# Patient Record
Sex: MALE | Race: BLACK OR AFRICAN AMERICAN | Employment: OTHER | URBAN - METROPOLITAN AREA
[De-identification: names, ages, dates, MRNs, and addresses within clinical notes are randomized per-mention and may not be internally consistent; named-entity substitution may affect disease eponyms.]

---

## 2018-11-24 ENCOUNTER — OFFICE VISIT (OUTPATIENT)
Dept: FAMILY MEDICINE CLINIC | Age: 58
End: 2018-11-24

## 2018-11-24 VITALS
OXYGEN SATURATION: 97 % | WEIGHT: 191 LBS | SYSTOLIC BLOOD PRESSURE: 148 MMHG | DIASTOLIC BLOOD PRESSURE: 96 MMHG | RESPIRATION RATE: 18 BRPM | HEART RATE: 89 BPM | TEMPERATURE: 98.2 F | HEIGHT: 66 IN | BODY MASS INDEX: 30.7 KG/M2

## 2018-11-24 DIAGNOSIS — R07.89 ATYPICAL CHEST PAIN: ICD-10-CM

## 2018-11-24 DIAGNOSIS — R51.9 BILATERAL HEADACHES: ICD-10-CM

## 2018-11-24 DIAGNOSIS — I10 ESSENTIAL HYPERTENSION: Primary | ICD-10-CM

## 2018-11-24 DIAGNOSIS — Z72.0 TOBACCO ABUSE: ICD-10-CM

## 2018-11-24 RX ORDER — AMLODIPINE BESYLATE 5 MG/1
5 TABLET ORAL DAILY
Qty: 90 TAB | Refills: 3 | Status: SHIPPED | OUTPATIENT
Start: 2018-11-24 | End: 2020-04-09 | Stop reason: SDUPTHER

## 2018-11-24 NOTE — PROGRESS NOTES
Ken Mcmanus [address] [place] [address] Cas[place]. [address]  [phone]             Date of visit: 11/24/2018   Subjective:      History obtained from:  the patient. Loretta Matos is a 62 y.o. male who presents today for new patient, doesn't have insurance so hasn't had his blood pressure medication in 2 years  Having some headaches and thinks that is due to his BP,f elt better before when on bp med  Headache is all over,all the way around, makes eyes hurt  Not every day  No nausea  Usually happens if he doesn't eat  Admits to some left-sided chest tightness, not really pain, no associated shortness of breath, lasts 20-30 sec or maybe a little longer, relieved by rest.    Has had a stress test when he wound up in ER about a year ago, everything was normal  Does have history of GERD and used to be on zantac but not having that now. Patient Active Problem List    Diagnosis Date Noted    Essential hypertension 11/24/2018    Tobacco abuse 11/24/2018       No Known Allergies  Past Medical History:   Diagnosis Date    Hypertension      History reviewed. No pertinent surgical history. Family History   Problem Relation Age of Onset   24 [facility] Hypertension Mother     Diabetes Mother     Hypertension Sister     Diabetes Sister     Hypertension Maternal Grandfather     Diabetes Maternal Grandfather      Social History     Tobacco Use    Smoking status: Current Every Day Smoker     Packs/day: 0.50     Years: 10.00     Pack years: 5.00    Smokeless tobacco: Never Used   Substance Use Topics    Alcohol use: Yes     Alcohol/week: 1.2 oz     Types: 2 Cans of beer per week      Social History     Social History Narrative    Not on file        Review of Systems  Gen: denies fever  Pulm: denies sob or cough.      PHQ over the last two weeks 11/24/2018   Little interest or pleasure in doing things Not at all   Feeling down, depressed, irritable, or hopeless Not at all   Total Score PHQ 2 0 Objective:     Vitals:    11/24/18 0855   BP: (!) 148/96   Pulse: 89   Resp: 18   Temp: 98.2 °F (36.8 °C)   TempSrc: Oral   SpO2: 97%   Weight: 191 lb (86.6 kg)   Height: 5' 6\" (1.676 m)     Body mass index is 30.83 kg/m². General: stated age, well-developed, and in NAD  Eyes: PERRL, EOMI, no redness or drainage  Nose: no drainage  Mouth: no lesions  Throat: no erythema, exudate or swelling  Neck: supple, symmetrical, trachea midline, no adenopathy and thyroid: not enlarged, symmetric, no tenderness/mass/nodules  Lungs:  clear to auscultation w/o rales, rhonchi, wheezes w/normal effort and no use of accessory muscles of respiration   Heart: regular rate and rhythm, S1, S2 normal, no murmur, click, rub or gallop  Abdomen: soft, nontender, no masses  Ext:  No edema noted. Lymph: no cervical adenopathy appreciated  Skin:  Normal. and no rash or abnormalities   Neuro: normal gait, CN 2-12 intact  Psych: alert and oriented to person, place, time and situation and Speech: appropriate quality, quantity and organization of sentences and normal affect    Assessment/Plan:       ICD-10-CM ICD-9-CM    1. Essential hypertension I10 401.9    2. Tobacco abuse Z72.0 305.1    3. Bilateral headaches R51 784.0    4.  Atypical chest pain R07.89 786.59         Orders Placed This Encounter    amLODIPine (NORVASC) 5 mg tablet       Start amlodipine, goodrx coupon given, urged him not to stop his bp med  Update me in 1 week about bp  Holding off on labs but he had some at work physical 3-4 mo ago, asked him to try to get me those results  Discussed that there is other preventive care he needs but holding off for now due to no insurance  Already had flu shot  Eating well, getting exercise at work, this was encouraged  Urged to quit smoking and resources given  He is ready to try  Discussed medication but really doesn't need it as little as he smokes  Chest tightness very atypical and had normal stress test last year but asked him to let me know if not improving  Hopefully TORRES will also improve back on med as it did in the past    Discussed the diagnosis and plan and he expressed understanding. Follow-up Disposition:  Return in about 1 year (around 11/24/2019) for Follow up.     Emerald Melendez MD

## 2018-11-24 NOTE — PROGRESS NOTES
Chief Complaint   Patient presents with    Headache    Elevated Blood Pressure    Establish Care     Pt presents in office today to establish care, has c/o headache and elevated blood pressue  Pr reports history of HTN, used to take BP medications but cannot remember the name of meds    1. Have you been to the ER, urgent care clinic since your last visit? Hospitalized since your last visit? No    2. Have you seen or consulted any other health care providers outside of the [address] [place] [address] since your last visit? Include any pap smears or colon screening.  No

## 2018-11-24 NOTE — PATIENT INSTRUCTIONS
Call 1-800-QUIT-NOW for help with quitting smoking! You can also get help from a text-messaging program: 
Smoke-freeTXT (see smokefree.gov) Or [employer] arvind Your blood pressure should be less than 140/90 consistently (more than 90% of the time). Check it after sitting and resting for 10 minutes for an accurate result. Please call me if it is often running high. DASH Diet: Care Instructions Your Care Instructions The DASH diet is an eating plan that can help lower your blood pressure. DASH stands for Dietary Approaches to Stop Hypertension. Hypertension is high blood pressure. The DASH diet focuses on eating foods that are high in calcium, potassium, and magnesium. These nutrients can lower blood pressure. The foods that are highest in these nutrients are fruits, vegetables, low-fat dairy products, nuts, seeds, and legumes. But taking calcium, potassium, and magnesium supplements instead of eating foods that are high in those nutrients does not have the same effect. The DASH diet also includes whole grains, fish, and poultry. The DASH diet is one of several lifestyle changes your doctor may recommend to lower your high blood pressure. Your doctor may also want you to decrease the amount of sodium in your diet. Lowering sodium while following the DASH diet can lower blood pressure even further than just the DASH diet alone. Follow-up care is a key part of your treatment and safety. Be sure to make and go to all appointments, and call your doctor if you are having problems. It's also a good idea to know your test results and keep a list of the medicines you take. How can you care for yourself at home? Following the DASH diet · Eat 4 to 5 servings of fruit each day. A serving is 1 medium-sized piece of fruit, ½ cup chopped or canned fruit, 1/4 cup dried fruit, or 4 ounces (½ cup) of fruit juice. Choose fruit more often than fruit juice. · Eat 4 to 5 servings of vegetables each day. A serving is 1 cup of lettuce or raw leafy vegetables, ½ cup of chopped or cooked vegetables, or 4 ounces (½ cup) of vegetable juice. Choose vegetables more often than vegetable juice. · Get 2 to 3 servings of low-fat and fat-free dairy each day. A serving is 8 ounces of milk, 1 cup of yogurt, or 1 ½ ounces of cheese. · Eat 6 to 8 servings of grains each day. A serving is 1 slice of bread, 1 ounce of dry cereal, or ½ cup of cooked rice, pasta, or cooked cereal. Try to choose whole-grain products as much as possible. · Limit lean meat, poultry, and fish to 2 servings each day. A serving is 3 ounces, about the size of a deck of cards. · Eat 4 to 5 servings of nuts, seeds, and legumes (cooked dried beans, lentils, and split peas) each week. A serving is 1/3 cup of nuts, 2 tablespoons of seeds, or ½ cup of cooked beans or peas. · Limit fats and oils to 2 to 3 servings each day. A serving is 1 teaspoon of vegetable oil or 2 tablespoons of salad dressing. · Limit sweets and added sugars to 5 servings or less a week. A serving is 1 tablespoon jelly or jam, ½ cup sorbet, or 1 cup of lemonade. · Eat less than 2,300 milligrams (mg) of sodium a day. If you limit your sodium to 1,500 mg a day, you can lower your blood pressure even more. Tips for success · Start small. Do not try to make dramatic changes to your diet all at once. You might feel that you are missing out on your favorite foods and then be more likely to not follow the plan. Make small changes, and stick with them. Once those changes become habit, add a few more changes. · Try some of the following: ? Make it a goal to eat a fruit or vegetable at every meal and at snacks. This will make it easy to get the recommended amount of fruits and vegetables each day. ? Try yogurt topped with fruit and nuts for a snack or healthy dessert. ? Add lettuce, tomato, cucumber, and onion to sandwiches. ? Combine a ready-made pizza crust with low-fat mozzarella cheese and lots of vegetable toppings. Try using tomatoes, squash, spinach, broccoli, carrots, cauliflower, and onions. ? Have a variety of cut-up vegetables with a low-fat dip as an appetizer instead of chips and dip. ? Sprinkle sunflower seeds or chopped almonds over salads. Or try adding chopped walnuts or almonds to cooked vegetables. ? Try some vegetarian meals using beans and peas. Add garbanzo or kidney beans to salads. Make burritos and tacos with mashed awan beans or black beans. Where can you learn more? Go to http://taj-sky.info/. Enter S795 in the search box to learn more about \"DASH Diet: Care Instructions. \" Current as of: December 6, 2017 Content Version: 11.8 © 4696-2340 Healthwise, [employer]. Care instructions adapted under license by [employer] (which disclaims liability or warranty for this information). If you have questions about a medical condition or this instruction, always ask your healthcare professional. [address] any warranty or liability for your use of this information.

## 2020-03-18 ENCOUNTER — TELEPHONE (OUTPATIENT)
Dept: FAMILY MEDICINE CLINIC | Age: 60
End: 2020-03-18

## 2020-03-18 NOTE — TELEPHONE ENCOUNTER
----- Message from Kathi Villar sent at 3/18/2020  3:37 PM EDT ----- Regarding: CHICA Davis/Telephone Contact: [phone] Caller's first and last name and relationship to patient (if not the patient): n/a Best contact number: [phone] Preferred date and time: as soon as possible Scheduled appointment date and time: 4/20/20 @ 2pm 
Reason for appointment: CHICA est pcp; ED Shelli Avendaño/Darrell, 3/13/20, diagnosis 580 high sugar level Details to clarify the request: n/a

## 2020-04-07 ENCOUNTER — TELEPHONE (OUTPATIENT)
Dept: FAMILY MEDICINE CLINIC | Age: 60
End: 2020-04-07

## 2020-04-07 NOTE — TELEPHONE ENCOUNTER
Outbound call.  verified. Spoke with the patient, let the pt know I was calling in regards to rescheduling NP appointment. Pt stated that he was discharged from [facility] on . Pt stated that his blood sugar was at 580. Let the pt know we are doing virtual visits. Pt wants to know how would this work, pt needs to have sugar levels checked. Please Advised. Best callback:[phone] LOV: 2018

## 2020-04-08 NOTE — TELEPHONE ENCOUNTER
Yes, [facility] in the office, thank you, since he will need labs anyway.  Claire Harrison MD 4/8/2020 9:01 AM

## 2020-04-09 ENCOUNTER — VIRTUAL VISIT (OUTPATIENT)
Dept: FAMILY MEDICINE CLINIC | Age: 60
End: 2020-04-09

## 2020-04-09 VITALS — HEIGHT: 66 IN | BODY MASS INDEX: 30.53 KG/M2 | WEIGHT: 190 LBS

## 2020-04-09 DIAGNOSIS — E66.9 OBESITY (BMI 30.0-34.9): ICD-10-CM

## 2020-04-09 DIAGNOSIS — G47.33 OSA (OBSTRUCTIVE SLEEP APNEA): ICD-10-CM

## 2020-04-09 DIAGNOSIS — E11.65 TYPE 2 DIABETES MELLITUS WITH HYPERGLYCEMIA, WITHOUT LONG-TERM CURRENT USE OF INSULIN (HCC): Primary | ICD-10-CM

## 2020-04-09 DIAGNOSIS — R07.89 ATYPICAL CHEST PAIN: ICD-10-CM

## 2020-04-09 DIAGNOSIS — Z72.0 TOBACCO ABUSE: ICD-10-CM

## 2020-04-09 DIAGNOSIS — K21.9 GASTROESOPHAGEAL REFLUX DISEASE, ESOPHAGITIS PRESENCE NOT SPECIFIED: ICD-10-CM

## 2020-04-09 DIAGNOSIS — I10 ESSENTIAL HYPERTENSION: ICD-10-CM

## 2020-04-09 RX ORDER — AMLODIPINE BESYLATE 5 MG/1
5 TABLET ORAL DAILY
Qty: 90 TAB | Refills: 3 | Status: SHIPPED | OUTPATIENT
Start: 2020-04-09 | End: 2020-05-14 | Stop reason: SDUPTHER

## 2020-04-09 NOTE — PATIENT INSTRUCTIONS

## 2020-04-09 NOTE — PROGRESS NOTES
Ken Mcmanus [facility]  [id number] [place] [address] [address]. Car[place], [address]  [phone]             Date of visit: 4/9/2020   Subjective:      History obtained from:  the patient. Giana Campbell is a 61 y.o. male who presents today for follow up    This service was provided through telehealth (doxy. me real-time video/audio) due to COVID-19 pandemic, with the patient being at home and the provider being at [facility] in [address] Others assisting in the telehealth encounter included none. 20 minutes were spent with the patient by the provider. he  and/or his healthcare decision maker is aware that this patient-initiated Telehealth encounter is a billable service, with coverage as determined by his insurance carrier. he  is aware that he  may receive a bill and has provided verbal consent to proceed: Yes per PSR    Was discharged from [facility] ER on 3/7 and 3/25, records reviewed  On 3/7 he presented with left sided chest pain 1 hour after eating Jannet Mech, had resolved when he got to ER  BP was 156/99  Other vitals normal  Exam was normal  Labs significant for glucose 560, creat 1.14, sugar in urine (no ketones)  All other labs normal including troponin, cbc, lft's electrolytes  ER sent him home with metformin as he was not wanting to be admitted as was offered    He went back to er on 3/25 asking for refill of metformin because he could not see me until late April  Denied symptoms  bp was 150/98  Other vitals normal  Exam normal  Labs not done  Sent home with more metformin and also norvasc 5mg    Was drinking  1 L daily of soda or sweet tea  Was eating peanut M&Ms, donuts, ice cream  Has started drinking just water and diet soda  Hoping that and the metformin are enough  The metformin not bothering his stomach    Previously was urinating every 2 ohurs at night, now he is not.     No insurance so is concerned about bills  ER helped him apply for medicaid but he doesn't know.    The chest pain is gone. Breathing not bothering him    Says he was dx with JAVIER and had cpap a couple years ago and does snore  No longer has cpap    I had not seen him in 1.5 years  Known high blood pressure, GERD but the diabetes is a new diagnosis  Smoking 1/2 PPD, trying to cut back gradually  Blood pressure not checked at home  GERD he does think might have been his problem, but that has been better since he changed his diet    Has had a stress test when he wound up in ER about 3 years ago that was normal      Patient Active Problem List    Diagnosis Date Noted    JAVIER (obstructive sleep apnea) 04/09/2020    Obesity (BMI 30.0-34.9) 04/09/2020    Gastroesophageal reflux disease 04/09/2020    Essential hypertension 11/24/2018    Tobacco abuse 11/24/2018       No Known Allergies  Past Medical History:   Diagnosis Date    Hypertension      History reviewed. No pertinent surgical history. Family History   Problem Relation Age of Onset   24 [facility] Hypertension Mother     Diabetes Mother     Hypertension Sister     Diabetes Sister     Hypertension Maternal Grandfather     Diabetes Maternal Grandfather      Social History     Tobacco Use    Smoking status: Current Every Day Smoker     Packs/day: 0.50     Years: 10.00     Pack years: 5.00    Smokeless tobacco: Never Used   Substance Use Topics    Alcohol use: Yes     Alcohol/week: 2.0 standard drinks     Types: 2 Cans of beer per week      Social History     Social History Narrative    Unemployed currently, worked as sterile processing technician        Review of Systems  Card: denies chest pain  Pulm: denies shortness of breath      Objective:     Vitals:    04/09/20 1046   Weight: 190 lb (86.2 kg)   Height: 5' 6\" (1.676 m)     Body mass index is 30.67 kg/m².      General: stated age, well developed, well nourished and in NAD  Psych: alert and oriented to person, place, time and situation and Speech: appropriate quality, quantity and organization of sentences and normal affect    Assessment/Plan:       ICD-10-CM ICD-9-CM    1. Type 2 diabetes mellitus with hyperglycemia, without long-term current use of insulin (HCC) E11.65 250.00      790.29    2. Essential hypertension I10 401.9    3. Tobacco abuse Z72.0 305.1    4. Atypical chest pain R07.89 786.59    5. Obesity (BMI 30.0-34. 9) E66.9 278.00    6. JAVIER (obstructive sleep apnea) G47.33 327.23    7. Gastroesophageal reflux disease, esophagitis presence not specified K21.9 530.81         Orders Placed This Encounter    amLODIPine (NORVASC) 5 mg tablet     His diabetes symptoms (weakness, frequent urination) have resolved since he stopped sodas, donuts, candy, excessive sweets. He had big room for improvement, so I think possible his diabetes could resolve with diet alone  Good he is on metformin, too  Would like labs asap  Will send information about  to check about medicaid  If can't get medicaid quickly advised to go to [facility]. He is not taking norvasc but willing to go back on it  bp was moderately high twice when in ER. JAVIER known, needs recheck and cpap hopefully when he gets medicaid    gerd symptoms resolved with diet changes  Chest pain was likely from that    Urged to quit smoking completely but is getting closer      Discussed the diagnosis and plan and he expressed understanding. Follow-up and Dispositions    · Return in about 3 months (around 7/9/2020) for Follow up.          Carson Calzada MD

## 2020-04-09 NOTE — PROGRESS NOTES
Identified pt with two pt identifiers(name and ). Reviewed record in preparation for visit and have obtained necessary documentation. Chief Complaint   Patient presents with   [facility] ED Follow-up     2020 for chest pain and numbness in arm elevated glucose        Health Maintenance Due   Topic    Hepatitis C Screening     Pneumococcal 0-64 years (1 of 1 - PPSV23)    DTaP/Tdap/Td series (1 - Tdap)    Lipid Screen     Shingrix Vaccine Age 50> (1 of 2)    FOBT Q1Y Age 54-65         Visit Vitals  Ht 5' 6\" (1.676 m)   Wt 190 lb (86.2 kg) Comment: stated weight   BMI 30.67 kg/m²     Pain Scale: 0 - No pain/10    Coordination of Care Questionnaire:  :   1. Have you been to the ER, urgent care clinic since your last visit? Hospitalized since your last visit?2020 for chest pain and numbness in arm elevated glucose    2. Have you seen or consulted any other health care providers outside of the [address] [place] [address] since your last visit? Include any pap smears or colon screening.  yes

## 2020-05-13 PROBLEM — E11.65 TYPE 2 DIABETES MELLITUS WITH HYPERGLYCEMIA, WITHOUT LONG-TERM CURRENT USE OF INSULIN (HCC): Status: ACTIVE | Noted: 2020-05-13

## 2020-05-13 NOTE — PATIENT INSTRUCTIONS
Diabetes Foot Health: Care Instructions Your Care Instructions When you have diabetes, your feet need extra care and attention. Diabetes can damage the nerve endings and blood vessels in your feet, making you less likely to notice when your feet are injured. Diabetes also limits your body's ability to fight infection and get blood to areas that need it. If you get a minor foot injury, it could become an ulcer or a serious infection. With good foot care, you can prevent most of these problems. Caring for your feet can be quick and easy. Most of the care can be done when you are bathing or getting ready for bed. Follow-up care is a key part of your treatment and safety. Be sure to make and go to all appointments, and call your doctor if you are having problems. It's also a good idea to know your test results and keep a list of the medicines you take. How can you care for yourself at home? · Keep your blood sugar close to normal by watching what and how much you eat, monitoring blood sugar, taking medicines if prescribed, and getting regular exercise. · Do not smoke. Smoking affects blood flow and can make foot problems worse. If you need help quitting, talk to your doctor about stop-smoking programs and medicines. These can increase your chances of quitting for good. · Eat a diet that is low in fats. High fat intake can cause fat to build up in your blood vessels and decrease blood flow. · Inspect your feet daily for blisters, cuts, cracks, or sores. If you cannot see well, use a mirror or have someone help you. · Take care of your feet: 
? Wash your feet every day. Use warm (not hot) water. Check the water temperature with your wrists or other part of your body, not your feet. ? Dry your feet well. Pat them dry. Do not rub the skin on your feet too hard. Dry well between your toes. If the skin on your feet stays moist, bacteria or a fungus can grow, which can lead to infection. ? Keep your skin soft. Use moisturizing skin cream to keep the skin on your feet soft and prevent calluses and cracks. But do not put the cream between your toes, and stop using any cream that causes a rash. ? Clean underneath your toenails carefully. Do not use a sharp object to clean underneath your toenails. Use the blunt end of a nail file or other rounded tool. ? Trim and file your toenails straight across to prevent ingrown toenails. Use a nail clipper, not scissors. Use an emery board to smooth the edges. · Change socks daily. Socks without seams are best, because seams often rub the feet. You can find socks for people with diabetes from specialty catalogs. · Look inside your shoes every day for things like gravel or torn linings, which could cause blisters or sores. · Buy shoes that fit well: 
? Look for shoes that have plenty of space around the toes. This helps prevent bunions and blisters. ? Try on shoes while wearing the kind of socks you will usually wear with the shoes. ? Avoid plastic shoes. They may rub your feet and cause blisters. Good shoes should be made of materials that are flexible and breathable, such as leather or cloth. ? Break in new shoes slowly by wearing them for no more than an hour a day for several days. Take extra time to check your feet for red areas, blisters, or other problems after you wear new shoes. · Do not go barefoot. Do not wear sandals, and do not wear shoes with very thin soles. Thin soles are easy to puncture. They also do not protect your feet from hot pavement or cold weather. · Have your doctor check your feet during each visit. If you have a foot problem, see your doctor. Do not try to treat an early foot problem at home. Home remedies or treatments that you can buy without a prescription (such as corn removers) can be harmful. · Always get early treatment for foot problems. A minor irritation can lead to a major problem if not properly cared for early. When should you call for help? Call your doctor now or seek immediate medical care if: 
  · You have a foot sore, an ulcer or break in the skin that is not healing after 4 days, bleeding corns or calluses, or an ingrown toenail.  
  · You have blue or black areas, which can mean bruising or blood flow problems.  
  · You have peeling skin or tiny blisters between your toes or cracking or oozing of the skin.  
  · You have a fever for more than 24 hours and a foot sore.  
  · You have new numbness or tingling in your feet that does not go away after you move your feet or change positions.  
  · You have unexplained or unusual swelling of the foot or ankle.  
 Watch closely for changes in your health, and be sure to contact your doctor if: 
  · You cannot do proper foot care. Where can you learn more? Go to http://taj-sky.info/ Enter A739 in the search box to learn more about \"Diabetes Foot Health: Care Instructions. \" Current as of: December 19, 2019Content Version: 12.4 © 5115-8930 Healthwise, [employer]. Care instructions adapted under license by [employer] (which disclaims liability or warranty for this information). If you have questions about a medical condition or this instruction, always ask your healthcare professional. [address] any warranty or liability for your use of this information.

## 2020-05-13 NOTE — PROGRESS NOTES
Ken Mcmanus [facility]  [address] [place], [address]  [phone]             Date of visit: 5/14/2020   Subjective:      History obtained from:  the patient. Clarita Sterling is a 61 y.o. male who presents today for follow up    This service was provided through telehealth (doxy. me real-time video/audio) due to COVID-19 pandemic, with the patient being at home and the provider being at [facility] in [place], [address] [facility]. Others assisting in the telehealth encounter included none. 15 minutes were spent with the patient by the provider. he  and/or his healthcare decision maker is aware that this patient-initiated Telehealth encounter is a billable service, with coverage as determined by his insurance carrier.  he  is aware that he  may receive a bill and has provided verbal consent to proceed: Yes per PSR    Newly diagnosed diabetes (dx at [facility] ER in March)  He cut out the sweets (1L soda/sweet tea daily, desserts, donuts) and symptoms resolved; still trying to avoid it as much as possible  Sugars not checked  Taking the metformin but ran out of it 1 month ago    Weight the same he thinks    Also on norvasc but hasn't checked bp    Having the chest pains again like he did in ER which they thought was acid reflux    Had wanted to order labs but had encouraged him to look into Medicaid or even cross over clinic as not wanting him to get more big bills  He has applied but hasn't heard back    Want to get his JAVIER re-evaluated and get him a cpap after he has insurance    Cutting back on smoking but still about the same    Patient Active Problem List    Diagnosis Date Noted    Type 2 diabetes mellitus with hyperglycemia, without long-term current use of insulin ([facility] [address] 05/13/2020    JAVIER (obstructive sleep apnea) 04/09/2020    Obesity (BMI 30.0-34.9) 04/09/2020    Gastroesophageal reflux disease 04/09/2020    Essential hypertension 11/24/2018    Tobacco abuse 11/24/2018 No Known Allergies  Past Medical History:   Diagnosis Date    Hypertension      No past surgical history on file. Family History   Problem Relation Age of Onset   [address] Hypertension Mother     Diabetes Mother     Hypertension Sister     Diabetes Sister     Hypertension Maternal Grandfather     Diabetes Maternal Grandfather      Social History     Tobacco Use    Smoking status: Current Every Day Smoker     Packs/day: 0.50     Years: 10.00     Pack years: 5.00    Smokeless tobacco: Never Used   Substance Use Topics    Alcohol use: Yes     Alcohol/week: 2.0 standard drinks     Types: 2 Cans of beer per week      Social History     Social History Narrative    Unemployed currently, worked as sterile processing technician        Review of Systems  Gen: denies fever  pulm denies cough      Objective: There were no vitals filed for this visit. There is no height or weight on file to calculate BMI. General: stated age, well developed, well nourished and in NAD  Skin:  No lesions noted on video  Psych: alert and oriented to person, place, time and situation and Speech: appropriate quality, quantity and organization of sentences      Assessment/Plan:       ICD-10-CM ICD-9-CM    1. Type 2 diabetes mellitus with hyperglycemia, without long-term current use of insulin (HCC) E11.65 250.00 HEMOGLOBIN A1C WITH EAG     292.13 METABOLIC PANEL, COMPREHENSIVE   2. Essential hypertension I10 401.9    3. Obesity (BMI 30.0-34. 9) E66.9 278.00    4.  Tobacco abuse Z72.0 305.1    5. JAVIER (obstructive sleep apnea) G47.33 327.23         Orders Placed This Encounter    HEMOGLOBIN A1C WITH EAG    METABOLIC PANEL, COMPREHENSIVE    DISCONTD: metFORMIN (GLUCOPHAGE) 500 mg tablet    pantoprazole (PROTONIX) 40 mg tablet    metFORMIN (GLUCOPHAGE) 500 mg tablet    amLODIPine (NORVASC) 5 mg tablet     Need labs but suspect DM much improved since he cut out the soda and donuts  Out of metformin fo r amonth but willing to go back on it  Delaying other labs until he gets insurance  Encouraged continued healthy lifestyle changes  Suspect the atypical chest pains are GERD as concluded in ER recently, advised to try the protonix and let me know if it doesn't help  Encouraged to check bp and let me know  Plan to refer for JAVIER when he has insurance    Discussed the diagnosis and plan and he expressed understanding. Follow-up and Dispositions    · Return in about 3 months (around 8/14/2020) for Follow up.          Macho Rossi MD

## 2020-05-14 ENCOUNTER — VIRTUAL VISIT (OUTPATIENT)
Dept: FAMILY MEDICINE CLINIC | Age: 60
End: 2020-05-14

## 2020-05-14 DIAGNOSIS — I10 ESSENTIAL HYPERTENSION: ICD-10-CM

## 2020-05-14 DIAGNOSIS — Z72.0 TOBACCO ABUSE: ICD-10-CM

## 2020-05-14 DIAGNOSIS — E66.9 OBESITY (BMI 30.0-34.9): ICD-10-CM

## 2020-05-14 DIAGNOSIS — G47.33 OSA (OBSTRUCTIVE SLEEP APNEA): ICD-10-CM

## 2020-05-14 DIAGNOSIS — E11.65 TYPE 2 DIABETES MELLITUS WITH HYPERGLYCEMIA, WITHOUT LONG-TERM CURRENT USE OF INSULIN (HCC): Primary | ICD-10-CM

## 2020-05-14 RX ORDER — PANTOPRAZOLE SODIUM 40 MG/1
40 TABLET, DELAYED RELEASE ORAL DAILY
Qty: 30 TAB | Refills: 6 | Status: SHIPPED | OUTPATIENT
Start: 2020-05-14 | End: 2020-09-04

## 2020-05-14 RX ORDER — METFORMIN HYDROCHLORIDE 500 MG/1
500 TABLET ORAL 2 TIMES DAILY WITH MEALS
Qty: 60 TAB | Refills: 6 | Status: SHIPPED | OUTPATIENT
Start: 2020-05-14 | End: 2020-05-14 | Stop reason: SDUPTHER

## 2020-05-14 RX ORDER — METFORMIN HYDROCHLORIDE 500 MG/1
500 TABLET ORAL 2 TIMES DAILY WITH MEALS
Qty: 60 TAB | Refills: 6 | Status: SHIPPED | OUTPATIENT
Start: 2020-05-14 | End: 2021-03-22 | Stop reason: SDUPTHER

## 2020-05-14 RX ORDER — AMLODIPINE BESYLATE 5 MG/1
5 TABLET ORAL DAILY
Qty: 30 TAB | Refills: 6 | Status: SHIPPED | OUTPATIENT
Start: 2020-05-14 | End: 2020-09-04

## 2020-07-17 ENCOUNTER — OFFICE VISIT (OUTPATIENT)
Dept: CARDIOLOGY CLINIC | Age: 60
End: 2020-07-17

## 2020-07-17 VITALS
BODY MASS INDEX: 29.09 KG/M2 | WEIGHT: 181 LBS | DIASTOLIC BLOOD PRESSURE: 80 MMHG | HEART RATE: 73 BPM | HEIGHT: 66 IN | RESPIRATION RATE: 20 BRPM | SYSTOLIC BLOOD PRESSURE: 130 MMHG | OXYGEN SATURATION: 97 %

## 2020-07-17 DIAGNOSIS — I50.22 SYSTOLIC CHF, CHRONIC (HCC): ICD-10-CM

## 2020-07-17 DIAGNOSIS — I10 ESSENTIAL HYPERTENSION: ICD-10-CM

## 2020-07-17 DIAGNOSIS — E11.65 TYPE 2 DIABETES MELLITUS WITH HYPERGLYCEMIA, WITHOUT LONG-TERM CURRENT USE OF INSULIN (HCC): ICD-10-CM

## 2020-07-17 DIAGNOSIS — I25.10 CORONARY ARTERY DISEASE INVOLVING NATIVE CORONARY ARTERY OF NATIVE HEART WITHOUT ANGINA PECTORIS: Primary | ICD-10-CM

## 2020-07-17 RX ORDER — ATORVASTATIN CALCIUM 80 MG/1
80 TABLET, FILM COATED ORAL DAILY
COMMUNITY
End: 2021-03-08 | Stop reason: SDUPTHER

## 2020-07-17 RX ORDER — NITROGLYCERIN 0.4 MG/1
0.4 TABLET SUBLINGUAL
COMMUNITY

## 2020-07-17 RX ORDER — METOPROLOL SUCCINATE 100 MG/1
100 TABLET, EXTENDED RELEASE ORAL DAILY
COMMUNITY
End: 2020-09-04

## 2020-07-17 RX ORDER — FAMOTIDINE 20 MG/1
20 TABLET, FILM COATED ORAL DAILY
COMMUNITY
End: 2021-07-12

## 2020-07-17 RX ORDER — LISINOPRIL 10 MG/1
10 TABLET ORAL DAILY
COMMUNITY
End: 2020-09-04

## 2020-07-17 RX ORDER — ASPIRIN 81 MG/1
TABLET ORAL DAILY
COMMUNITY

## 2020-07-17 RX ORDER — CLOPIDOGREL BISULFATE 75 MG/1
75 TABLET ORAL DAILY
COMMUNITY
End: 2021-03-08

## 2020-07-17 NOTE — Clinical Note
8/4/20 Patient: Pat Mckenna YOB: 1960 Date of Visit: 7/17/2020 Ryann Villarreal MD 
[address] [facility] [address] [address] VIA In Basket Dear Ryann Villarreal MD, Thank you for referring Mr. Pat Mckenna to [address] [place] [address] for evaluation. My notes for this consultation are attached. If you have questions, please do not hesitate to call me. I look forward to following your patient along with you.  
 
 
Sincerely, 
 
Shwetha Cabello MD

## 2020-07-17 NOTE — PROGRESS NOTES
Visit Vitals  /80 (BP 1 Location: Left arm, BP Patient Position: Sitting)   Pulse 73   Resp 20   Ht 5' 6\" (1.676 m)   Wt 181 lb (82.1 kg)   SpO2 97%   BMI 29.21 kg/m²

## 2020-07-17 NOTE — PROGRESS NOTES
Andrae Lizarraga     1960       Anjelica Damon MD, [facility]  Date of Visit-7/17/2020   PCP is Kiana García MD   [facility] [place]  [facility]  HPI:  Andrae Lizarraga is a 61 y.o. male   Here to establish as a new patient with CAD  Referral from Kiana García MD   Hx of diabetes since seen in the ER at [facility] in March, HTN and CP along with tobacco use. Pt admitted to [facility] in [place] on 6/27/20 with NSTEMI and had placement of a stent. Pt's card indicates he had a Biotronic stent to the LAD 3.5 mm x 13 mm. He thought that his CP was acid reflux and took Pepto-Bismol for management after his visit in March. When he was in [place], the CP recurred and the [address] did nothing for sx relief. He went to the hospital as a result and was diagnosed with MI. This pain felt as though his heart was being squeezed along with left arm numbness. He has not had any recurrent pain in the last few weeks. He is still feeling fatigued. Denies chest pain, edema, syncope or shortness of breath at rest, has no tachycardia, palpitations or sense of arrhythmia. EKG: Sinus rhythm significant T wave inversions in the anterior leads. Assessment/Plan:     1. Coronary artery disease involving native coronary artery of native heart without angina pectoris  Recent MI with stent to the LAD   Has anterior T wave inversions on EKG  Sxs have improved but not complete resolution. I'm concerned he's getting fatigue and will reduce the Toprol to 50 mg every day He will continue DAPT and statin long-term. I have recommended cardiac rehab  Future Appointments   Date Time Provider Jarett Garciaisti   9/1/2020 11:20 AM Anjelica Neal MD C[facility]RE[facility] BS AMB        2. Systolic CHF, chronic (HCC)  We don't know an EF, but he said he had a loss in heart muscle function. I will obtain an echo. - AMB POC EKG ROUTINE W/ 12 LEADS, INTER & REP    3.  Essential hypertension  At goal , meds and possible side effects reviewed and patient denies    Key CAD CHF Meds             aspirin delayed-release 81 mg tablet (Taking) Take  by mouth daily. atorvastatin (LIPITOR) 80 mg tablet (Taking) Take 80 mg by mouth daily. clopidogreL (Plavix) 75 mg tab (Taking) Take 75 mg by mouth daily. lisinopriL (PRINIVIL, ZESTRIL) 10 mg tablet (Taking) Take 10 mg by mouth daily. metoprolol succinate (TOPROL-XL) 100 mg tablet (Taking) Take 100 mg by mouth daily. nitroglycerin (NITROSTAT) 0.4 mg SL tablet (Taking) 0.4 mg by SubLINGual route every five (5) minutes as needed for Chest Pain. Up to 3 doses. amLODIPine (NORVASC) 5 mg tablet Take 1 Tab by mouth daily. 4. Type 2 diabetes mellitus with hyperglycemia, without long-term current use of insulin ([facility]  5. XOL  Lipids on high potency statin as appropriate for secondary prevention. At goal , denies excess muscle aches or new liver issues  Key Antihyperlipidemia Meds             atorvastatin (LIPITOR) 80 mg tablet (Taking) Take 80 mg by mouth daily. No results found for: LDL, LDLC, DLDLP         Impression:   1. Coronary artery disease involving native coronary artery of native heart without angina pectoris    2. Systolic CHF, chronic ([facility] [address] [facility]    3. Essential hypertension    4. Type 2 diabetes mellitus with hyperglycemia, without long-term current use of insulin [facility])       Cardiac History:   No specialty comments available.    NSTEMI 2020 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 HENT: Negative for ear pain, hearing loss, nosebleeds and tinnitus. Eyes: Negative for blurred vision, double vision and pain. Respiratory: Negative for cough, hemoptysis, sputum production, shortness of breath, wheezing and stridor. Cardiovascular: Negative for chest pain, palpitations, orthopnea, claudication and leg swelling. Gastrointestinal: Negative for abdominal pain, blood in stool, constipation, diarrhea, heartburn, nausea and vomiting. Genitourinary: Negative for dysuria, frequency and urgency. Musculoskeletal: Negative for back pain, falls and joint pain. Skin: Negative for rash. Neurological: Negative for dizziness, seizures, loss of consciousness, weakness and headaches. Endo/Heme/Allergies: Does not bruise/bleed easily. Psychiatric/Behavioral: Negative for depression, hallucinations and memory loss. The patient is not nervous/anxious and does not have insomnia. see supplement sheet, initialed and to be scanned by staff  Past Medical History:   Diagnosis Date    Hypertension       Social Hx= reports that he has been smoking. He has a 5.00 pack-year smoking history. He has never used smokeless tobacco. He reports current alcohol use of about 2.0 standard drinks of alcohol per week. He reports that he does not use drugs. Exam and Labs:  /80 (BP 1 Location: Left arm, BP Patient Position: Sitting)   Pulse 73   Resp 20   Ht 5' 6\" (1.676 m)   Wt 181 lb (82.1 kg)   SpO2 97%   BMI 29.21 kg/m² Constitutional:  NAD, comfortable  Head: NC,AT. Eyes: No scleral icterus. Neck:  Neck supple. No JVD present. Throat: moist mucous membranes. Chest: Effort normal & normal respiratory excursion . Neurological: alert, conversant and oriented . Skin: Skin is not cold. No obvious systemic rash noted. Not diaphoretic. No erythema.  Psychiatric:  Grossly normal mood and affect. Behavior appears normal. Extremities:  no clubbing or cyanosis. Abdomen: non distended    Lungs:breath sounds normal. No stridor. distress, wheezes or  Rales. Heart:2/6 WALTER throughout. normal rate, regular rhythm, normal S1, S2, no rubs, clicks or gallops , PMI non displaced. Edema: Edema is none. No results found for: CHOL, CHOLX, CHLST, CHOLV, HDL, HDLP, LDL, LDLC, DLDLP, TGLX, TRIGL, TRIGP, CHHD, CHHDX  No results found for: NA, K, CL, CO2, AGAP, GLU, BUN, CREA, BUCR, GFRAA, GFRNA, CA, GFRAA   Wt Readings from Last 3 Encounters:   07/17/20 181 lb (82.1 kg)   04/09/20 190 lb (86.2 kg)   11/24/18 191 lb (86.6 kg)      BP Readings from Last 3 Encounters:   07/17/20 130/80   11/24/18 (!) 148/96      Current Outpatient Medications   Medication Sig    aspirin delayed-release 81 mg tablet Take  by mouth daily.  atorvastatin (LIPITOR) 80 mg tablet Take 80 mg by mouth daily.  clopidogreL (Plavix) 75 mg tab Take 75 mg by mouth daily.  famotidine (PEPCID) 20 mg tablet Take 20 mg by mouth daily.  lisinopriL (PRINIVIL, ZESTRIL) 10 mg tablet Take 10 mg by mouth daily.  metoprolol succinate (TOPROL-XL) 100 mg tablet Take 100 mg by mouth daily.  nitroglycerin (NITROSTAT) 0.4 mg SL tablet 0.4 mg by SubLINGual route every five (5) minutes as needed for Chest Pain. Up to 3 doses.  metFORMIN (GLUCOPHAGE) 500 mg tablet Take 1 Tab by mouth two (2) times daily (with meals).  pantoprazole (PROTONIX) 40 mg tablet Take 1 Tab by mouth daily. (help with good. rx coupon please)    amLODIPine (NORVASC) 5 mg tablet Take 1 Tab by mouth daily. No current facility-administered medications for this visit. Impression see above.       Written by Fely Lima, as dictated by Kingsley Everett MD.

## 2020-08-04 PROBLEM — I25.10 CORONARY ARTERY DISEASE INVOLVING NATIVE CORONARY ARTERY OF NATIVE HEART WITHOUT ANGINA PECTORIS: Status: ACTIVE | Noted: 2020-08-04

## 2020-09-04 ENCOUNTER — OFFICE VISIT (OUTPATIENT)
Dept: CARDIOLOGY CLINIC | Age: 60
End: 2020-09-04
Payer: MEDICAID

## 2020-09-04 VITALS
SYSTOLIC BLOOD PRESSURE: 130 MMHG | HEART RATE: 86 BPM | WEIGHT: 181.8 LBS | HEIGHT: 66 IN | DIASTOLIC BLOOD PRESSURE: 84 MMHG | RESPIRATION RATE: 15 BRPM | BODY MASS INDEX: 29.22 KG/M2 | OXYGEN SATURATION: 98 %

## 2020-09-04 DIAGNOSIS — E11.65 TYPE 2 DIABETES MELLITUS WITH HYPERGLYCEMIA, WITHOUT LONG-TERM CURRENT USE OF INSULIN (HCC): ICD-10-CM

## 2020-09-04 DIAGNOSIS — I10 ESSENTIAL HYPERTENSION: ICD-10-CM

## 2020-09-04 DIAGNOSIS — I50.22 SYSTOLIC CHF, CHRONIC (HCC): ICD-10-CM

## 2020-09-04 DIAGNOSIS — I25.10 CORONARY ARTERY DISEASE INVOLVING NATIVE CORONARY ARTERY OF NATIVE HEART WITHOUT ANGINA PECTORIS: Primary | ICD-10-CM

## 2020-09-04 DIAGNOSIS — I42.2 ASYMMETRIC SEPTAL HYPERTROPHY (HCC): ICD-10-CM

## 2020-09-04 DIAGNOSIS — E78.00 HYPERCHOLESTEREMIA: ICD-10-CM

## 2020-09-04 PROCEDURE — 99214 OFFICE O/P EST MOD 30 MIN: CPT | Performed by: SPECIALIST

## 2020-09-04 RX ORDER — METOPROLOL SUCCINATE 50 MG/1
1 TABLET, EXTENDED RELEASE ORAL DAILY
COMMUNITY
Start: 2020-07-31 | End: 2021-03-08 | Stop reason: SDUPTHER

## 2020-09-04 RX ORDER — LISINOPRIL 5 MG/1
1 TABLET ORAL DAILY
COMMUNITY
Start: 2020-07-31 | End: 2021-07-12 | Stop reason: SDUPTHER

## 2020-09-04 NOTE — Clinical Note
9/4/20 Patient: Lendon Dancer YOB: 1960 Date of Visit: 9/4/2020 Jung Villa MD 
[address] [facility] [address] [address] VIA In Basket Dear Jung Villa MD, Thank you for referring Mr. Lendon Dancer to [address] [place] [address] for evaluation. My notes for this consultation are attached. If you have questions, please do not hesitate to call me. I look forward to following your patient along with you.  
 
 
Sincerely, 
 
Julee Sanders MD

## 2020-09-04 NOTE — PROGRESS NOTES
Huan Das     1960       Anjelica Novak MD, [facility]  Date of Visit-9/4/2020   PCP is Hank Brito MD   [facility] [place]  [facility]  HPI:  Huan Das is a [address] y.o. male   2 month f/u with CAD. Hx of diabetes since seen in the ER at [facility] in March, HTN and CP along with tobacco use. Pt admitted to [facility] in [place] on 6/27/20 with NSTEMI and had placement of a stent. Pt's card indicates he had a Biotronic stent to the LAD 3.5 mm x 13 mm. Last visit we reduced Toprol due to fatigue and recommended cardiac rehab. An echo was done because the EF was unknown. This showed asymmetric septal hypertrophy with normal LVEF of 63%. Prior EKG had demonstrated moderately deep symmetric negative T-waves. [facility]. Pt states he still has some SOB. He reports having some abdominal pain earlier today. He did not notice any change after reducing Metoprolol. Denies chest pain, edema, syncope, has no tachycardia, palpitations or sense of arrhythmia.    07/30/20   ECHO ADULT COMPLETE 07/30/2020 7/30/2020    Narrative · Septal asymmetric hypertrophy (1.9, 1.4 cm). No asymmetric gradient   noted. · LV: Normal cavity size and systolic function (ejection fraction normal). Estimated left ventricular ejection fraction is 63%. Abnorml left   ventricular strain. · LA: Mildly dilated left atrium. · AV: Aortic valve sclerosis with no significant stenosis. Aortic valve   leaflet calcification present. Signed by: Taylor Driscoll MD            Assessment/Plan:    Future Appointments   Date Time Provider Jarett Nidia   3/8/2021  1:00 PM MD MARGIE Ford  AMB     Patient Instructions   You will need to follow up in clinic with Dr. Evie Novak in 6 months. 1. Coronary artery disease involving native coronary artery of native heart without angina pectoris  Prior stent to the LAD 6/27/20.  Will continue DAPT for one year. Went over his cardiac meds which also include statin and BB and ACE inhibitor. He is pain free with a normal LV function. He does still have SOB that is mild. Likely nuclear imaging in 1 year. 2. Asymmetric septal hypertrophy (HCC)  Noted on echo. This explains the T waves that we see on EKG. It may account for some of his SOB. Overall it will bear repeat echo and monitoring but I don't think that he needs a further negative inotrope. 3. Systolic CHF, chronic (HCC)  EF is actually normal. His SOB is likely more diastolic dysfunction from [address]. 4. Essential hypertension  Well controlled. At goal , meds and possible side effects reviewed and patient denies  Key CAD CHF Meds             metoprolol succinate (TOPROL-XL) 50 mg XL tablet (Taking) Take 1 Tab by mouth daily. lisinopriL (PRINIVIL, ZESTRIL) 5 mg tablet (Taking) Take 1 Tab by mouth daily. aspirin delayed-release 81 mg tablet (Taking) Take  by mouth daily. atorvastatin (LIPITOR) 80 mg tablet (Taking) Take 80 mg by mouth daily. clopidogreL (Plavix) 75 mg tab (Taking) Take 75 mg by mouth daily. nitroglycerin (NITROSTAT) 0.4 mg SL tablet (Taking) 0.4 mg by SubLINGual route every five (5) minutes as needed for Chest Pain. Up to 3 doses. BP Readings from Last 6 Encounters:   09/04/20 130/84   07/30/20 130/80   07/17/20 130/80   11/24/18 (!) 148/96           5. Type 2 diabetes mellitus with hyperglycemia, without long-term current use of insulin (HCC)  Continue Metformin. 6. XOL  Lipids on high potency statin as appropriate for secondary prevention. No results found for: LDL, LDLC, DLDLP     He asks about stopping medicines. I went over each of his pills that he brought. He will stop Famotidine when this bottle is done and then monitor for sx's of GERD     F/u in 6 months     Impression:   1. Coronary artery disease involving native coronary artery of native heart without angina pectoris    2. Asymmetric septal hypertrophy (HCC)    3. Systolic CHF, chronic ([address]    4. Essential hypertension    5. Type 2 diabetes mellitus with hyperglycemia, without long-term current use of insulin [facility])       Cardiac History:   No specialty comments available. ROS-except as noted above. . A complete cardiac and respiratory are reviewed and negative except as above ; Resp-denies wheezing  or productive cough,. Const- No unusual weight loss or fever; Neuro-no recent seizure or CVA ; GI- No BRBPR, abdom pain, bloating ; - no  hematuria   see supplement sheet, initialed and to be scanned by staff  Past Medical History:   Diagnosis Date    Asymmetric septal hypertrophy ([facility] 9/8/2020    Coronary artery disease involving native coronary artery of native heart without angina pectoris 8/4/2020    NSTEMI June 2020 in [place] with stent to LAD    Gastroesophageal reflux disease 4/9/2020    Hypertension     JAVIER (obstructive sleep apnea) 4/9/2020    Tobacco abuse 11/24/2018    Type 2 diabetes mellitus with hyperglycemia, without long-term current use of insulin ([facility] 5/13/2020      Social Hx= reports that he has been smoking. He has a 5.00 pack-year smoking history. He has never used smokeless tobacco. He reports current alcohol use of about 2.0 standard drinks of alcohol per week. He reports that he does not use drugs. Exam and Labs:  /84 (BP 1 Location: Left arm, BP Patient Position: Sitting)   Pulse 86   Resp 15   Ht 5' 6\" (1.676 m)   Wt 181 lb 12.8 oz (82.5 kg)   SpO2 98%   BMI 29.34 kg/m² Constitutional:  NAD, comfortable  Head: NC,AT. Eyes: No scleral icterus. Neck:  Neck supple. No JVD present. Throat: moist mucous membranes. Chest: Effort normal & normal respiratory excursion . Neurological: alert, conversant and oriented . Skin: Skin is not cold. No obvious systemic rash noted. Not diaphoretic. No erythema. Psychiatric:  Grossly normal mood and affect.   Behavior appears normal. Extremities:  no clubbing or cyanosis. Abdomen: non distended    Lungs:breath sounds normal. No stridor. distress, wheezes or  Rales. OVTGK:6/7 short systolic murmur RUSB normal rate, regular rhythm, normal S1, S2, no rubs, clicks or gallops , PMI non displaced. Edema: Edema is none. No results found for: CHOL, CHOLX, CHLST, CHOLV, HDL, HDLP, LDL, LDLC, DLDLP, TGLX, TRIGL, TRIGP, CHHD, CHHDX  No results found for: NA, K, CL, CO2, AGAP, GLU, BUN, CREA, BUCR, GFRAA, GFRNA, CA, GFRAA   Wt Readings from Last 3 Encounters:   09/04/20 181 lb 12.8 oz (82.5 kg)   07/30/20 181 lb (82.1 kg)   07/17/20 181 lb (82.1 kg)      BP Readings from Last 3 Encounters:   09/04/20 130/84   07/30/20 130/80   07/17/20 130/80      Current Outpatient Medications   Medication Sig    metoprolol succinate (TOPROL-XL) 50 mg XL tablet Take 1 Tab by mouth daily.  lisinopriL (PRINIVIL, ZESTRIL) 5 mg tablet Take 1 Tab by mouth daily.  aspirin delayed-release 81 mg tablet Take  by mouth daily.  atorvastatin (LIPITOR) 80 mg tablet Take 80 mg by mouth daily.  clopidogreL (Plavix) 75 mg tab Take 75 mg by mouth daily.  famotidine (PEPCID) 20 mg tablet Take 20 mg by mouth daily.  nitroglycerin (NITROSTAT) 0.4 mg SL tablet 0.4 mg by SubLINGual route every five (5) minutes as needed for Chest Pain. Up to 3 doses.  metFORMIN (GLUCOPHAGE) 500 mg tablet Take 1 Tab by mouth two (2) times daily (with meals). No current facility-administered medications for this visit. Impression see above.       Written by Brandt Khalil, as dictated by Jerrod Sanchez MD.

## 2020-09-08 PROBLEM — E78.00 HYPERCHOLESTEREMIA: Status: ACTIVE | Noted: 2020-09-08

## 2020-09-08 PROBLEM — I42.2 ASYMMETRIC SEPTAL HYPERTROPHY (HCC): Status: ACTIVE | Noted: 2020-09-08

## 2020-09-10 ENCOUNTER — TELEPHONE (OUTPATIENT)
Dept: CARDIAC REHAB | Age: 60
End: 2020-09-10

## 2020-09-10 NOTE — TELEPHONE ENCOUNTER
9/10/2020 Cardiac Rehab: Called  Lendon Dancer to discuss participation in the Cardiac Rehab Program following NSTEMI/STENT on 6/27/2020. Left voicemail message.  Montrell Gonzalez

## 2020-11-18 ENCOUNTER — PATIENT MESSAGE (OUTPATIENT)
Dept: FAMILY MEDICINE CLINIC | Age: 60
End: 2020-11-18

## 2021-02-02 ENCOUNTER — OFFICE VISIT (OUTPATIENT)
Dept: FAMILY MEDICINE CLINIC | Age: 61
End: 2021-02-02
Payer: MEDICAID

## 2021-02-02 VITALS
OXYGEN SATURATION: 98 % | TEMPERATURE: 98 F | RESPIRATION RATE: 16 BRPM | HEART RATE: 98 BPM | DIASTOLIC BLOOD PRESSURE: 74 MMHG | WEIGHT: 175.2 LBS | HEIGHT: 66 IN | SYSTOLIC BLOOD PRESSURE: 128 MMHG | BODY MASS INDEX: 28.16 KG/M2

## 2021-02-02 DIAGNOSIS — Z12.11 COLON CANCER SCREENING: ICD-10-CM

## 2021-02-02 DIAGNOSIS — M54.50 CHRONIC MIDLINE LOW BACK PAIN WITHOUT SCIATICA: ICD-10-CM

## 2021-02-02 DIAGNOSIS — Z11.59 ENCOUNTER FOR HEPATITIS C SCREENING TEST FOR LOW RISK PATIENT: ICD-10-CM

## 2021-02-02 DIAGNOSIS — I25.10 CORONARY ARTERY DISEASE INVOLVING NATIVE CORONARY ARTERY OF NATIVE HEART WITHOUT ANGINA PECTORIS: ICD-10-CM

## 2021-02-02 DIAGNOSIS — M25.511 CHRONIC RIGHT SHOULDER PAIN: ICD-10-CM

## 2021-02-02 DIAGNOSIS — I10 ESSENTIAL HYPERTENSION: ICD-10-CM

## 2021-02-02 DIAGNOSIS — R10.11 RUQ PAIN: ICD-10-CM

## 2021-02-02 DIAGNOSIS — M79.604 PAIN IN BOTH LOWER EXTREMITIES: Primary | ICD-10-CM

## 2021-02-02 DIAGNOSIS — E78.00 HYPERCHOLESTEREMIA: ICD-10-CM

## 2021-02-02 DIAGNOSIS — E11.65 TYPE 2 DIABETES MELLITUS WITH HYPERGLYCEMIA, WITHOUT LONG-TERM CURRENT USE OF INSULIN (HCC): ICD-10-CM

## 2021-02-02 DIAGNOSIS — N52.9 ERECTILE DYSFUNCTION, UNSPECIFIED ERECTILE DYSFUNCTION TYPE: ICD-10-CM

## 2021-02-02 DIAGNOSIS — M79.605 PAIN IN BOTH LOWER EXTREMITIES: Primary | ICD-10-CM

## 2021-02-02 DIAGNOSIS — G89.29 CHRONIC MIDLINE LOW BACK PAIN WITHOUT SCIATICA: ICD-10-CM

## 2021-02-02 DIAGNOSIS — K21.9 GASTROESOPHAGEAL REFLUX DISEASE, UNSPECIFIED WHETHER ESOPHAGITIS PRESENT: ICD-10-CM

## 2021-02-02 DIAGNOSIS — G89.29 CHRONIC RIGHT SHOULDER PAIN: ICD-10-CM

## 2021-02-02 PROCEDURE — 99215 OFFICE O/P EST HI 40 MIN: CPT | Performed by: FAMILY MEDICINE

## 2021-02-02 RX ORDER — PANTOPRAZOLE SODIUM 40 MG/1
40 TABLET, DELAYED RELEASE ORAL DAILY
COMMUNITY
Start: 2021-01-17 | End: 2021-07-12

## 2021-02-02 RX ORDER — AMLODIPINE BESYLATE 5 MG/1
5 TABLET ORAL DAILY
COMMUNITY
Start: 2021-01-17 | End: 2021-03-08 | Stop reason: SDUPTHER

## 2021-02-02 NOTE — PROGRESS NOTES
Elizabeth Venegas  64 y.o. male  1960  [address]  [id number]     [address] [facility]       Encounter Date: 2/2/2021           Established Patient Visit Note: Abdiel Javier MD    Reason for Appointment:  Chief Complaint   Patient presents with    Diabetes    Pain (Chronic)     legs, feet and back       History of Present Illness:  History provided by patient    Elizabeth Venegas is a 64 y.o. male who presents to clinic today for:    · Right Shoulder Pain: reports starting work with [employer] one month ago and has had right shoulder pain since starting. He has not seen anyone for it. He stopped working there one week ago, but he continues to have pain. · Also endorses having pain in RUQ of abdomen that is chronic  · Leg Pain: He reports having a lot of pain in his feet with prolonged standing that has been present since his heartattack. He also endorses having pain in his lower legs with walking. · Back Pain: several years; worse after sitting; does not see pain specialist.   · ED: both getting and obtaining an erection; he has never had anything prescribed for this, he has tried OTC \"katherine\" which helped a little  · DM2: metformin 500mg bid; no recent A1c on file  · HLD: takes lipitor 80mg  · CAD/HTN: followed by cardiology; occurred in [facility], had stent placement and on plavix; has not needed nitrostat recently; on Toprol 50mg daily and Lisinopril; scheduled for cardiology follow up in March  · GERD: on pantoprazole and PRN pepcid       Review of Systems  Review of Systems   Constitutional: Negative for chills and fever. Respiratory: Negative for cough, shortness of breath and wheezing. Cardiovascular: Negative for chest pain and palpitations. Allergies: Patient has no known allergies.     Medications: (Updated to reflect final medication list after visit)    Current Outpatient Medications:     metoprolol succinate (TOPROL-XL) 50 mg XL tablet, Take 1 Tab by mouth daily. , Disp: , Rfl:     lisinopriL (PRINIVIL, ZESTRIL) 5 mg tablet, Take 1 Tab by mouth daily. , Disp: , Rfl:     aspirin delayed-release 81 mg tablet, Take  by mouth daily. , Disp: , Rfl:     atorvastatin (LIPITOR) 80 mg tablet, Take 80 mg by mouth daily. , Disp: , Rfl:     clopidogreL (Plavix) 75 mg tab, Take 75 mg by mouth daily. , Disp: , Rfl:     famotidine (PEPCID) 20 mg tablet, Take 20 mg by mouth daily. , Disp: , Rfl:     nitroglycerin (NITROSTAT) 0.4 mg SL tablet, 0.4 mg by SubLINGual route every five (5) minutes as needed for Chest Pain. Up to 3 doses. , Disp: , Rfl:     metFORMIN (GLUCOPHAGE) 500 mg tablet, Take 1 Tab by mouth two (2) times daily (with meals). , Disp: 60 Tab, Rfl: 6    amLODIPine (NORVASC) 5 mg tablet, Take 5 mg by mouth daily. , Disp: , Rfl:     pantoprazole (PROTONIX) 40 mg tablet, Take 40 mg by mouth daily. , Disp: , Rfl:     History  Patient Care Team:  Marni Hollins MD as PCP - General (Family Medicine)  Marni Hollins MD as PCP - [facility]    Past Medical History: he has a past medical history of [facility] (9/8/2020), Coronary artery disease involving native coronary artery of native heart without angina pectoris (8/4/2020), Gastroesophageal reflux disease (4/9/2020), Hypertension, JAVIER (obstructive sleep apnea) (4/9/2020), Tobacco abuse (11/24/2018), and Type 2 diabetes mellitus with hyperglycemia, without long-term current use of insulin ([facility] (5/13/2020). Past Surgical History: he has no past surgical history on file. Family Medical History: family history includes Diabetes in his maternal grandfather, mother, and sister; Hypertension in his maternal grandfather, mother, and sister. Social History: he reports that he has been smoking. He has a 5.00 pack-year smoking history. He has never used smokeless tobacco. He reports current alcohol use of about 2.0 standard drinks of alcohol per week.  He reports that he does not use drugs. Health Maintenance Due   Topic Date Due    Hepatitis C Screening  1960    Foot Exam Q1  02/08/1970    A1C test (Diabetic or Prediabetic)  02/08/1970    MICROALBUMIN Q1  02/08/1970    Eye Exam Retinal or Dilated  02/08/1970    Lipid Screen  02/08/1970    Colorectal Cancer Screening Combo  02/08/2010       Objective:   Visit Vitals  /74 (BP 1 Location: Left upper arm, BP Patient Position: Sitting)   Pulse 98   Temp 98 °F (36.7 °C) (Oral)   Resp 16   Ht 5' 6\" (1.676 m)   Wt 175 lb 3.2 oz (79.5 kg)   SpO2 98%   BMI 28.28 kg/m²     Wt Readings from Last 3 Encounters:   02/02/21 175 lb 3.2 oz (79.5 kg)   09/04/20 181 lb 12.8 oz (82.5 kg)   07/30/20 181 lb (82.1 kg)       Physical Exam  Constitutional:       Appearance: Normal appearance. HENT:      Head: Normocephalic and atraumatic. Right Ear: External ear normal.      Left Ear: External ear normal.      Nose: Nose normal.      Mouth/Throat:      Pharynx: No oropharyngeal exudate. Eyes:      General: No scleral icterus. Right eye: No discharge. Left eye: No discharge. Conjunctiva/sclera: Conjunctivae normal.      Pupils: Pupils are equal, round, and reactive to light. Neck:      Musculoskeletal: Normal range of motion and neck supple. Thyroid: No thyromegaly. Vascular: No JVD. Trachea: No tracheal deviation. Cardiovascular:      Rate and Rhythm: Normal rate and regular rhythm. Heart sounds: Murmur present. Systolic murmur present with a grade of 3/6. No friction rub. No gallop. Pulmonary:      Effort: Pulmonary effort is normal. No respiratory distress. Breath sounds: Normal breath sounds. No stridor. No wheezing. Musculoskeletal: Normal range of motion. General: No tenderness or deformity. Right shoulder: He exhibits normal range of motion, no tenderness, no bony tenderness, no swelling, no effusion, no crepitus and no deformity.       Comments: Right shoulder: pain with internal rotation     Lymphadenopathy:      Cervical: No cervical adenopathy. Skin:     General: Skin is warm and dry. Neurological:      Mental Status: He is alert. Cranial Nerves: No cranial nerve deficit. Coordination: Coordination normal.      Gait: Gait is intact. Deep Tendon Reflexes: Reflexes normal.         Assessment & Plan:      ICD-10-CM ICD-9-CM    1. Pain in both lower extremities  M79.604 729.5 ANKLE BRACHIAL INDEX    M79.605     2. Type 2 diabetes mellitus with hyperglycemia, without long-term current use of insulin (HCC)  E11.65 250.00 LIPID PANEL     790.29 HEMOGLOBIN A1C WITH EAG      METABOLIC PANEL, COMPREHENSIVE      CBC W/O DIFF      MICROALBUMIN, UR, RAND W/ MICROALB/CREAT RATIO   3. Chronic midline low back pain without sciatica  M54.5 724.2 REFERRAL TO PAIN MANAGEMENT    G89.29 338.29    4. Erectile dysfunction, unspecified erectile dysfunction type  N52.9 607.84 PSA W/ REFLX FREE PSA   5. Chronic right shoulder pain  M25.511 719.41 REFERRAL TO PHYSICAL THERAPY    G89.29 338.29 XR SHOULDER RT AP/LAT MIN 2 V      CANCELED: XR SHOULDER RT 1V   6. Colon cancer screening  Z12.11 V76.51 REFERRAL TO GASTROENTEROLOGY   7. Hypercholesteremia  E78.00 272.0    8. RUQ pain  J45.11 630.48 METABOLIC PANEL, COMPREHENSIVE      LIPASE       ABD LTD   9. Coronary artery disease involving native coronary artery of native heart without angina pectoris  I25.10 414.01    10. Gastroesophageal reflux disease, unspecified whether esophagitis present  K21.9 530.81    11. Encounter for hepatitis C screening test for low risk patient  Z11.59 V73.89 HCV AB W/RFLX TO ANJANA     · Pain in lower Extremities: Chronic, uncontrolled. See orders  · DM2: chronic, unclear control. Obtain Labs  · Low Back Pain: Chronic, uncontrolled. See orders  · ED: Chronic, uncontrolled. Check PSA and RTC 4 weeks to discuss medication options  · Right Shoulder Pain: Chronic, uncontrolled.  Obtain Xray and treat with PT  · HLD: Chronic, unclear control. Check lipids  · RUQ Pain: Chronic, unclear etiology obtain labs and imaging. · CAD: Chronic, stable. Discussed red flag symptoms and reasons to call or go to ED  · GERD: Chronic, stable. Continue current regimen    I have discussed the diagnosis with the patient and the intended plan as seen in the above orders. The patient has received an after-visit summary along with patient information handout. I have discussed medication side effects and warnings with the patient as well. Disposition  Follow-up and Dispositions    · Return in about 4 weeks (around 3/2/2021).            Orquidea Song MD

## 2021-02-02 NOTE — PROGRESS NOTES
Chief Complaint   Patient presents with    Diabetes    Pain (Chronic)     legs, feet and back     1. Have you been to the ER, urgent care clinic since your last visit? Hospitalized since your last visit? No    2. Have you seen or consulted any other health care providers outside of the [address] [place] [address] since your last visit? Include any pap smears or colon screening.  Yes Where: cardio Dr Joycie Schlatter last seen ~ Sept    Eye exam scheduled for Monday

## 2021-02-02 NOTE — PATIENT INSTRUCTIONS
Rotator Cuff: Exercises Introduction Here are some examples of exercises for you to try. The exercises may be suggested for a condition or for rehabilitation. Start each exercise slowly. Ease off the exercises if you start to have pain. You will be told when to start these exercises and which ones will work best for you. How to do the exercises Pendulum swing If you have pain in your back, do not do this exercise. 1. Hold on to a table or the back of a chair with your good arm. Then bend forward a little and let your sore arm hang straight down. This exercise does not use the arm muscles. Rather, use your legs and your hips to create movement that makes your arm swing freely. 2. Use the movement from your hips and legs to guide the slightly swinging arm back and forth like a pendulum (or elephant trunk). Then guide it in circles that start small (about the size of a dinner plate). Make the circles a bit larger each day, as your pain allows. 3. Do this exercise for 5 minutes, 5 to 7 times each day. 4. As you have less pain, try bending over a little farther to do this exercise. This will increase the amount of movement at your shoulder. Posterior stretching exercise 1. Hold the elbow of your injured arm with your other hand. 2. Use your hand to pull your injured arm gently up and across your body. You will feel a gentle stretch across the back of your injured shoulder. 3. Hold for at least 15 to 30 seconds. Then slowly lower your arm. 4. Repeat 2 to 4 times. Up-the-back stretch Your doctor or physical therapist may want you to wait to do this stretch until you have regained most of your range of motion and strength. You can do this stretch in different ways. Hold any of these stretches for at least 15 to 30 seconds. Repeat them 2 to 4 times. 1. Light stretch: Put your hand in your back pocket. Let it rest there to stretch your shoulder. 2. Moderate stretch: With your other hand, hold your injured arm (palm outward) behind your back by the wrist. Pull your arm up gently to stretch your shoulder. 3. Advanced stretch: Put a towel over your other shoulder. Put the hand of your injured arm behind your back. Now hold the back end of the towel. With the other hand, hold the front end of the towel in front of your body. Pull gently on the front end of the towel. This will bring your hand farther up your back to stretch your shoulder. Overhead stretch 1. Standing about an arm's length away, grasp onto a solid surface. You could use a countertop, a doorknob, or the back of a sturdy chair. 2. With your knees slightly bent, bend forward with your arms straight. Lower your upper body, and let your shoulders stretch. 3. As your shoulders are able to stretch farther, you may need to take a step or two backward. 4. Hold for at least 15 to 30 seconds. Then stand up and relax. If you had stepped back during your stretch, step forward so you can keep your hands on the solid surface. 5. Repeat 2 to 4 times. Shoulder flexion (lying down) To make a wand for this exercise, use a piece of PVC pipe or a broom handle with the broom removed. Make the wand about a foot wider than your shoulders. 1. Lie on your back, holding a wand with both hands. Your palms should face down as you hold the wand. 2. Keeping your elbows straight, slowly raise your arms over your head. Raise them until you feel a stretch in your shoulders, upper back, and chest. 
3. Hold for 15 to 30 seconds. 4. Repeat 2 to 4 times. Shoulder rotation (lying down) To make a wand for this exercise, use a piece of PVC pipe or a broom handle with the broom removed. Make the wand about a foot wider than your shoulders. 1. Lie on your back. Hold a wand with both hands with your elbows bent and palms up. 2. Keep your elbows close to your body, and move the wand across your body toward the sore arm. 3. Hold for 8 to 12 seconds. 4. Repeat 2 to 4 times. Wall climbing (to the side) Avoid any movement that is straight to your side, and be careful not to arch your back. Your arm should stay about 30 degrees to the front of your side. 1. Stand with your side to a wall so that your fingers can just touch it at an angle about 30 degrees toward the front of your body. 2. Walk the fingers of your injured arm up the wall as high as pain permits. Try not to shrug your shoulder up toward your ear as you move your arm up. 3. Hold that position for a count of at least 15 to 20. 
4. Walk your fingers back down to the starting position. 5. Repeat at least 2 to 4 times. Try to reach higher each time. Wall climbing (to the front) During this stretching exercise, be careful not to arch your back. 1. Face a wall, and stand so your fingers can just touch it. 2. Keeping your shoulder down, walk the fingers of your injured arm up the wall as high as pain permits. (Don't shrug your shoulder up toward your ear.) 3. Hold your arm in that position for at least 15 to 30 seconds. 4. Slowly walk your fingers back down to where you started. 5. Repeat at least 2 to 4 times. Try to reach higher each time. Shoulder blade squeeze 1. Stand with your arms at your sides, and squeeze your shoulder blades together. Do not raise your shoulders up as you squeeze. 2. Hold 6 seconds. 3. Repeat 8 to 12 times. Scapular exercise: Arm reach 1. Lie flat on your back. This exercise is a very slight motion that starts with your arms raised (elbows straight, arms straight). 2. From this position, reach higher toward the nuria or ceiling. Keep your elbows straight. All motion should be from your shoulder blade only. 3. Relax your arms back to where you started. 4. Repeat 8 to 12 times. Arm raise to the side During this strengthening exercise, your arm should stay about 30 degrees to the front of your side. 
1. Slowly raise your injured arm to the side, with your thumb facing up. Raise your arm 60 degrees at the most (shoulder level is 90 degrees). 
2. Hold the position for 3 to 5 seconds. Then lower your arm back to your side. If you need to, bring your \"good\" arm across your body and place it under the elbow as you lower your injured arm. Use your good arm to keep your injured arm from dropping down too fast. 
3. Repeat 8 to 12 times. 
4. When you first start out, don't hold any extra weight in your hand. As you get stronger, you may use a 1-pound to 2-pound dumbbell or a small can of food.   
Shoulder flexor and extensor exercise  
These are isometric exercises. That means you contract your muscles without actually moving. 
1. Push forward (flex): Stand facing a wall or doorjamb, about 6 inches or less back. Hold your injured arm against your body. Make a closed fist with your thumb on top. Then gently push your hand forward into the wall with about 25% to 50% of your strength. Don't let your body move backward as you push. Hold for about 6 seconds. Relax for a few seconds. Repeat 8 to 12 times. 
2. Push backward (extend): Stand with your back flat against a wall. Your upper arm should be against the wall, with your elbow bent 90 degrees (your hand straight ahead). Push your elbow gently back against the wall with about 25% to 50% of your strength. Don't let your body move forward as you push. Hold for about 6 seconds. Relax for a few seconds. Repeat 8 to 12 times.   
Scapular exercise: Wall push-ups  
This exercise is best done with your fingers somewhat turned out, rather than straight up and down. 
1. Stand facing a wall, about 12 inches to 18 inches away. 
2. Place your hands on the wall at shoulder height. 
3. Slowly bend your elbows and bring your face to the wall. Keep your back and hips straight. 
 4. Push back to where you started. 5. Repeat 8 to 12 times. 6. When you can do this exercise against a wall comfortably, you can try it against a counter. You can then slowly progress to the end of a couch, then to a sturdy chair, and finally to the floor. Scapular exercise: Retraction For this exercise, you will need elastic exercise material, such as surgical tubing or Thera-Band. 1. Put the band around a solid object at about waist level. (A bedpost will work well.) Each hand should hold an end of the band. 2. With your elbows at your sides and bent to 90 degrees, pull the band back. Your shoulder blades should move toward each other. Then move your arms back where you started. 3. Repeat 8 to 12 times. 4. If you have good range of motion in your shoulders, try this exercise with your arms lifted out to the sides. Keep your elbows at a 90-degree angle. Raise the elastic band up to about shoulder level. Pull the band back to move your shoulder blades toward each other. Then move your arms back where you started. Internal rotator strengthening exercise 1. Start by tying a piece of elastic exercise material to a doorknob. You can use surgical tubing or Thera-Band. 2. Stand or sit with your shoulder relaxed and your elbow bent 90 degrees. Your upper arm should rest comfortably against your side. Squeeze a rolled towel between your elbow and your body for comfort. This will help keep your arm at your side. 3. Hold one end of the elastic band in the hand of the painful arm. 4. Slowly rotate your forearm toward your body until it touches your belly. Slowly move it back to where you started. 5. Keep your elbow and upper arm firmly tucked against the towel roll or at your side. 6. Repeat 8 to 12 times. External rotator strengthening exercise 1. Start by tying a piece of elastic exercise material to a doorknob. You can use surgical tubing or Thera-Band. (You may also hold one end of the band in each hand.) 2. Stand or sit with your shoulder relaxed and your elbow bent 90 degrees. Your upper arm should rest comfortably against your side. Squeeze a rolled towel between your elbow and your body for comfort. This will help keep your arm at your side. 3. Hold one end of the elastic band with the hand of the painful arm. 4. Start with your forearm across your belly. Slowly rotate the forearm out away from your body. Keep your elbow and upper arm tucked against the towel roll or the side of your body until you begin to feel tightness in your shoulder. Slowly move your arm back to where you started. 5. Repeat 8 to 12 times. Follow-up care is a key part of your treatment and safety. Be sure to make and go to all appointments, and call your doctor if you are having problems. It's also a good idea to know your test results and keep a list of the medicines you take. Where can you learn more? Go to http://www.gray.com/ Enter Gus Molina in the search box to learn more about \"Rotator Cuff: Exercises. \" Current as of: March 2, 2020               Content Version: 12.6 © 0383-5339 [employer]. Care instructions adapted under license by [employer] (which disclaims liability or warranty for this information). If you have questions about a medical condition or this instruction, always ask your healthcare professional. [place] [address] any warranty or liability for your use of this information. Rotator Cuff Rehabilitation What is rotator cuff rehabilitation? Rotator cuff rehabilitation is a series of exercises you do after your surgery. It helps you get back your shoulder's range of motion and strength. You will work with your doctor and physical therapist to plan this exercise program. To get the best results, you need to do the exercises correctly and as often as your doctor tells you. Before you start any exercises, talk with your doctor or physical therapist. It is important that you know exactly how to do the exercises. Stop and call your doctor if you are not sure that you are doing the exercises correctly or if you have any pain. Hearing clicks and pops during exercise is not always cause for concern, but a grinding feeling may mean a more serious problem. Ice your shoulder after exercising if it is sore. Follow-up care is a key part of your treatment and safety. Be sure to make and go to all appointments, and call your doctor if you are having problems. It's also a good idea to know your test results and keep a list of the medicines you take. Stretching exercises Do not start doing stretching exercises until your doctor says you can. Your doctor will tell you which exercises to do, and how often and how long to do them. Posterior stretch · Stand upright with your feet shoulder-width apart. · Put the hand of your affected arm on the opposite shoulder, and hold the elbow to your body. · Then, using your good arm, hold the elbow of your affected arm and move it gently up, away from, and across your body. External rotation · Hold a lightweight stick or annette in your good arm. It should be about 2 feet long. A curtain annette may work well. · Lie on your back with your elbows next to your sides. Rest the elbow of your affected arm on a small pillow or folded towel. · Set your arms so that the elbows are bent at a 90-degree angle, like the letter \"L. \" Your hands will point straight up. · Hold the stick with both hands. Use your good arm to push the stick toward the affected arm so the affected arm moves outward, away from your body. Stop when you feel the arm stretching. Strength exercises Do not start strength exercises until your doctor says you can. Usually, this is at least 6 to 8 weeks after surgery. Your doctor will tell you how often and how long to do the exercises. Arm raises to the side · Stand upright with your feet shoulder-width apart and your affected arm at your side. · Slowly raise your injured arm to the side, with your thumb facing up. Raise your arm 60 degrees at the most (shoulder level is 90 degrees). · After holding the position for 3 to 5 seconds, lower your arm back to your side. If you need to, bring your \"good\" arm across your body and place it under the elbow as you lower your injured arm. Use your good arm to keep your injured arm from dropping down too fast during the downward motion. · Repeat 8 to 12 times. · When you first start out, don't hold any additional weight in your hand. As your strength improves, you may use a 1- to 2-pound dumbbell or a small can of food. Shoulder flexor · Stand facing a wall. Your body should be about 6 inches away from the wall. · Keep your affected arm and elbow to your side, and bend your elbow so that your arm is pointing toward the wall. · Make a closed fist with your thumb on top. · Push your hand into the wall and hold it for 6 seconds. Push with 25% to 50% of the force you have. Shoulder extension · Stand with your back flat against a wall. · Keep your affected arm and elbow at your side, and bend your elbow so that your upper arm is against the wall and your lower arm is pointing straight ahead. Make a closed fist with your thumb on top. · Push your elbow gently back against the wall, holding for 6 seconds. Push with 25% to 50% of the force you have. Where can you learn more? Go to http://www.gray.com/ Enter C177 in the search box to learn more about \"Rotator Cuff Rehabilitation. \" Current as of: March 2, 2020               Content Version: 12.6 © 3898-3057 [employer]. Care instructions adapted under license by [employer] (which disclaims liability or warranty for this information). If you have questions about a medical condition or this instruction, always ask your healthcare professional. [address] any warranty or liability for your use of this information. Rotator Cuff Injury: Care Instructions Your Care Instructions The rotator cuff is a group of tendons and muscles around the shoulder that keeps the upper arm bone in place. It keeps the shoulder joint stable and allows you to raise and rotate your arm. Damage to the rotator cuff can be caused by overuse, a fall, or a direct blow to the shoulder area, which can tear the tendons. Over time, everyday wear can damage the tendons and make injury more likely. Treatment can depend upon the amount of damage to the tendons. In a younger person, surgery may be the first choice. But if you are older than 25, you likely have some wear on your rotator cuff. Surgery may not be the most effective treatment. Your doctor may have you try physical therapy and exercise first. 
Follow-up care is a key part of your treatment and safety. Be sure to make and go to all appointments, and call your doctor if you are having problems. It's also a good idea to know your test results and keep a list of the medicines you take. How can you care for yourself at home? · Rest your shoulder as much as you can. If your doctor put your arm in a sling or shoulder immobilizer, wear it as directed. Do not take it off before your doctor tells you to. If it is too tight, loosen it. · Be safe with medicines. Read and follow all instructions on the label. ? If the doctor gave you a prescription medicine for pain, take it as prescribed. ? If you are not taking a prescription pain medicine, ask your doctor if you can take an over-the-counter medicine. · Put ice or a cold pack on your shoulder for 10 to 20 minutes at a time. Try to do this every 1 to 2 hours for the next 3 days (when you are awake). Put a thin cloth between the ice pack and your skin. · After 3 days, put a warm, wet towel on your shoulder. This is to relax the muscles and help blood flow. · While holding a warm, wet towel on your shoulder, lean forward so your arm hangs freely, and gently swing your arm back and forth like a pendulum. You also can do this standing under a warm shower. · Do not do anything that makes your pain worse. · Follow your doctor's advice about whether you need physical therapy. When should you call for help? Call your doctor now or seek immediate medical care if: 
  · You have severe pain.  
  · You cannot move your shoulder or arm.  
  · You have tingling or numbness in your arm or hand.  
  · Your arm or hand is cool or pale. Watch closely for changes in your health, and be sure to contact your doctor if: 
  · Your pain gets worse.  
  · You have new or worse swelling in your arm or hand.  
  · You do not get better as expected. Where can you learn more? Go to http://www.gray.ID [employer]/ Enter [phone] 83 [phone] in the search box to learn more about \"Rotator Cuff Injury: Care Instructions. \" Current as of: March 2, 2020               Content Version: 12.6 © 2758-8327 [employer]. Care instructions adapted under license by [employer] (which disclaims liability or warranty for this information). If you have questions about a medical condition or this instruction, always ask your healthcare professional. [address] any warranty or liability for your use of this information.

## 2021-03-08 ENCOUNTER — OFFICE VISIT (OUTPATIENT)
Dept: CARDIOLOGY CLINIC | Age: 61
End: 2021-03-08
Payer: MEDICAID

## 2021-03-08 VITALS
WEIGHT: 177 LBS | OXYGEN SATURATION: 98 % | HEIGHT: 66 IN | HEART RATE: 81 BPM | BODY MASS INDEX: 28.45 KG/M2 | DIASTOLIC BLOOD PRESSURE: 90 MMHG | SYSTOLIC BLOOD PRESSURE: 130 MMHG | RESPIRATION RATE: 18 BRPM

## 2021-03-08 DIAGNOSIS — I50.22 SYSTOLIC CHF, CHRONIC (HCC): ICD-10-CM

## 2021-03-08 DIAGNOSIS — E78.00 HYPERCHOLESTEREMIA: ICD-10-CM

## 2021-03-08 DIAGNOSIS — E11.65 TYPE 2 DIABETES MELLITUS WITH HYPERGLYCEMIA, WITHOUT LONG-TERM CURRENT USE OF INSULIN (HCC): ICD-10-CM

## 2021-03-08 DIAGNOSIS — I10 ESSENTIAL HYPERTENSION: ICD-10-CM

## 2021-03-08 DIAGNOSIS — I42.2 ASYMMETRIC SEPTAL HYPERTROPHY (HCC): ICD-10-CM

## 2021-03-08 DIAGNOSIS — I25.10 CORONARY ARTERY DISEASE INVOLVING NATIVE CORONARY ARTERY OF NATIVE HEART WITHOUT ANGINA PECTORIS: Primary | ICD-10-CM

## 2021-03-08 PROCEDURE — 3051F HG A1C>EQUAL 7.0%<8.0%: CPT | Performed by: SPECIALIST

## 2021-03-08 PROCEDURE — 99214 OFFICE O/P EST MOD 30 MIN: CPT | Performed by: SPECIALIST

## 2021-03-08 RX ORDER — ATORVASTATIN CALCIUM 80 MG/1
80 TABLET, FILM COATED ORAL DAILY
Qty: 90 TAB | Refills: 3 | Status: SHIPPED | OUTPATIENT
Start: 2021-03-08 | End: 2021-07-12 | Stop reason: SDUPTHER

## 2021-03-08 RX ORDER — AMLODIPINE BESYLATE 5 MG/1
5 TABLET ORAL DAILY
Qty: 90 TAB | Refills: 3 | Status: SHIPPED | OUTPATIENT
Start: 2021-03-08

## 2021-03-08 RX ORDER — CLOPIDOGREL BISULFATE 75 MG/1
75 TABLET ORAL DAILY
Qty: 90 TAB | Refills: 3 | Status: SHIPPED
Start: 2021-03-08 | End: 2021-07-12

## 2021-03-08 RX ORDER — METOPROLOL SUCCINATE 50 MG/1
50 TABLET, EXTENDED RELEASE ORAL DAILY
Qty: 90 TAB | Refills: 3 | Status: SHIPPED
Start: 2021-03-08 | End: 2021-09-27

## 2021-03-08 NOTE — Clinical Note
3/8/2021 Patient: Debora Owusu YOB: 1960 Date of Visit: 3/8/2021 Pamela Connolly MD 
[address] [facility] [address] [id number] [address] [employer] Dear Pamela Connolly MD, Thank you for referring Mr. Debora Owusu to [address] [place] [address] for evaluation. My notes for this consultation are attached. If you have questions, please do not hesitate to call me. I look forward to following your patient along with you.  
 
 
Sincerely, 
 
Ekta Camarillo MD

## 2021-03-08 NOTE — PROGRESS NOTES
Debora Owusu     1960       Anjelica Fierro MD, [facility] [place]  Date of Visit-3/8/2021   PCP is Pat Valentine, [address] [facility] [place]  [facility]  HPI:  Debora Owusu is a 64 y.o. male   11 month f/u of CAD. Hx of diabetes since seen in the ER at [facility] in March 2020, HTN and CP along with tobacco use. Pt admitted to [facility] in [place] on 6/27/20 with NSTEMI and had placement of a stent. Pt's card indicates he had a Biotronic stent to the LAD 3.5 mm x 13 mm.      In July 2020 we reduced Toprol due to fatigue and recommended cardiac rehab. An echo was done because the EF was unknown. This showed asymmetric septal hypertrophy with normal LVEF of 63%. Prior EKG had demonstrated moderately deep symmetric negative T-waves. Likely this is due to the [address]   07/30/20   ECHO ADULT COMPLETE 07/30/2020 7/30/2020    Narrative · Septal asymmetric hypertrophy (1.9, 1.4 cm). No asymmetric gradient   noted. · LV: Normal cavity size and systolic function (ejection fraction normal). Estimated left ventricular ejection fraction is 63%. Abnorml left   ventricular strain. · LA: Mildly dilated left atrium. · AV: Aortic valve sclerosis with no significant stenosis. Aortic valve   leaflet calcification present. Signed by: Tori Garcia MD      Overall the pt states he is doing well. He only reports having indigestion. He has stopped taking Famotidine. Denies chest pain, edema, syncope or shortness of breath at rest, has no tachycardia, palpitations or sense of arrhythmia. Assessment/Plan:     Patient Instructions   We will see you back in 6 months. Future Appointments   Date Time Provider Jarett Motleyi   4/16/2021  9:00 AM LAB ONLY PAFDONN BS AMB   4/19/2021 10:45 AM MD YASHIRA Marcum BS AMB   4/22/2021  9:00 AM Jessica Wiggins MD [facility] BS AMB   9/13/2021  1:20 PM Tori Garcia MD [facility] BS AMB       1.  Coronary artery disease involving native coronary artery of native heart without angina pectoris  S/p LAD stent last June. Can stop Plavix in June of 2021. He should continue statin and BB. I don't see a BB in his bottles. We will clarify that with him. His sx's have resolved. He will need a stress test after the next visit given his high risk of recurrent disease. 2. Asymmetric septal hypertrophy (HCC)  Seen on echo. Abnormal T waves. Mild RUIZ. Noted murmur. Should continue BB. 3. Systolic CHF, chronic (HCC)  EF normal now. 07/30/20   ECHO ADULT COMPLETE 07/30/2020 7/30/2020    Narrative · Septal asymmetric hypertrophy (1.9, 1.4 cm). No asymmetric gradient   noted. · LV: Normal cavity size and systolic function (ejection fraction normal). Estimated left ventricular ejection fraction is 63%. Abnorml left   ventricular strain. · LA: Mildly dilated left atrium. · AV: Aortic valve sclerosis with no significant stenosis. Aortic valve   leaflet calcification present. Signed by: Janeth Turner MD         4. Essential hypertension  High diastolic. Not clear that he is taking BB. We will refill Amlodipine. He is now off of ACE and BB. Review chart. At goal , meds and possible side effects reviewed and patient denies  Key CAD CHF Meds             metoprolol succinate (TOPROL-XL) 50 mg XL tablet (Taking) Take 1 Tab by mouth daily. atorvastatin (LIPITOR) 80 mg tablet (Taking) Take 1 Tab by mouth daily. amLODIPine (NORVASC) 5 mg tablet (Taking) Take 1 Tab by mouth daily. clopidogreL (Plavix) 75 mg tab (Taking) Take 1 Tab by mouth daily. aspirin delayed-release 81 mg tablet (Taking) Take  by mouth daily. nitroglycerin (NITROSTAT) 0.4 mg SL tablet (Taking) 0.4 mg by SubLINGual route every five (5) minutes as needed for Chest Pain. Up to 3 doses. lisinopriL (PRINIVIL, ZESTRIL) 5 mg tablet Take 1 Tab by mouth daily.                 03/08/21 (!) 130/90   02/02/21 128/74   09/04/20 130/84   07/30/20 130/80 07/17/20 130/80        5. Type 2 diabetes mellitus with hyperglycemia, without long-term current use of insulin (HCC)  On Metformin. 6. Hypercholesteremia  Lipids on high potency statin as appropriate for secondary prevention. Labs ordered in February still not done by pt. At goal , denies excess muscle aches or new liver issues  Key Antihyperlipidemia Meds             atorvastatin (LIPITOR) 80 mg tablet (Taking) Take 1 Tab by mouth daily. F/u in 6 months     Impression:   1. Coronary artery disease involving native coronary artery of native heart without angina pectoris    2. Asymmetric septal hypertrophy (HCC)    3. Systolic CHF, chronic ([address]    4. Essential hypertension    5. Type 2 diabetes mellitus with hyperglycemia, without long-term current use of insulin (HCC)    6. Hypercholesteremia       Cardiac History:   No specialty comments available. ROS-except as noted above. . A complete cardiac and respiratory are reviewed and negative except as above ; Resp-denies wheezing  or productive cough,. Const- No unusual weight loss or fever; Neuro-no recent seizure or CVA ; GI- No BRBPR, abdom pain, bloating ; - no  hematuria   see supplement sheet, initialed and to be scanned by staff  Past Medical History:   Diagnosis Date    Asymmetric septal hypertrophy ([facility] 9/8/2020    Coronary artery disease involving native coronary artery of native heart without angina pectoris 8/4/2020    NSTEMI June 2020 in [place] with stent to LAD    Gastroesophageal reflux disease 4/9/2020    Hypertension     JAVIER (obstructive sleep apnea) 4/9/2020    Tobacco abuse 11/24/2018    Type 2 diabetes mellitus with hyperglycemia, without long-term current use of insulin ([facility] 5/13/2020      Social Hx= reports that he has been smoking. He has a 5.00 pack-year smoking history. He has never used smokeless tobacco. He reports previous alcohol use of about 2.0 standard drinks of alcohol per week.  He reports that he does not use drugs. Exam and Labs:  BP (!) 130/90 (BP 1 Location: Left upper arm, BP Patient Position: Sitting, BP Cuff Size: Adult)   Pulse 81   Resp 18   Ht 5' 6\" (1.676 m)   Wt 177 lb (80.3 kg)   SpO2 98%   BMI 28.57 kg/m² Constitutional:  NAD, comfortable  Head: NC,AT. Eyes: No scleral icterus. Neck:  Neck supple. No JVD present. Throat: moist mucous membranes. Chest: Effort normal & normal respiratory excursion . Neurological: alert, conversant and oriented . Skin: Skin is not cold. No obvious systemic rash noted. Not diaphoretic. No erythema. Psychiatric:  Grossly normal mood and affect. Behavior appears normal. Extremities:  no clubbing or cyanosis. Abdomen: non distended    Lungs:breath sounds normal. No stridor. distress, wheezes or  Rales. UMVBM:5/7 short systolic murmur normal rate, regular rhythm, normal S1, S2, no rubs, clicks or gallops , PMI non displaced. Edema: Edema is none. No results found for: CHOL, CHOLX, CHLST, CHOLV, HDL, HDLP, LDL, LDLC, DLDLP, TGLX, TRIGL, TRIGP, CHHD, CHHDX  No results found for: NA, K, CL, CO2, AGAP, GLU, BUN, CREA, BUCR, GFRAA, GFRNA, CA, GFRAA   Wt Readings from Last 3 Encounters:   03/08/21 177 lb (80.3 kg)   02/02/21 175 lb 3.2 oz (79.5 kg)   09/04/20 181 lb 12.8 oz (82.5 kg)      BP Readings from Last 3 Encounters:   03/08/21 (!) 130/90   02/02/21 128/74   09/04/20 130/84      Current Outpatient Medications   Medication Sig    amLODIPine (NORVASC) 5 mg tablet Take 5 mg by mouth daily.  pantoprazole (PROTONIX) 40 mg tablet Take 40 mg by mouth daily.  metoprolol succinate (TOPROL-XL) 50 mg XL tablet Take 1 Tab by mouth daily.  aspirin delayed-release 81 mg tablet Take  by mouth daily.  atorvastatin (LIPITOR) 80 mg tablet Take 80 mg by mouth daily.  clopidogreL (Plavix) 75 mg tab Take 75 mg by mouth daily.  famotidine (PEPCID) 20 mg tablet Take 20 mg by mouth daily.     nitroglycerin (NITROSTAT) 0.4 mg SL tablet 0.4 mg by SubLINGual route every five (5) minutes as needed for Chest Pain. Up to 3 doses.  metFORMIN (GLUCOPHAGE) 500 mg tablet Take 1 Tab by mouth two (2) times daily (with meals).  lisinopriL (PRINIVIL, ZESTRIL) 5 mg tablet Take 1 Tab by mouth daily. No current facility-administered medications for this visit. Impression see above.       Written by Kristin Clancy, as dictated by Lakhwinder Martin MD.

## 2021-03-08 NOTE — PROGRESS NOTES
.  Visit Vitals  BP (!) 130/90 (BP 1 Location: Left upper arm, BP Patient Position: Sitting, BP Cuff Size: Adult)   Pulse 81   Resp 18   Ht 5' 6\" (1.676 m)   Wt 177 lb (80.3 kg)   SpO2 98%   BMI 28.57 kg/m²

## 2021-03-09 ENCOUNTER — LAB ONLY (OUTPATIENT)
Dept: FAMILY MEDICINE CLINIC | Age: 61
End: 2021-03-09

## 2021-03-09 DIAGNOSIS — E11.65 TYPE 2 DIABETES MELLITUS WITH HYPERGLYCEMIA, WITHOUT LONG-TERM CURRENT USE OF INSULIN (HCC): ICD-10-CM

## 2021-03-09 DIAGNOSIS — N52.9 ERECTILE DYSFUNCTION, UNSPECIFIED ERECTILE DYSFUNCTION TYPE: ICD-10-CM

## 2021-03-09 DIAGNOSIS — Z11.59 ENCOUNTER FOR HEPATITIS C SCREENING TEST FOR LOW RISK PATIENT: ICD-10-CM

## 2021-03-09 DIAGNOSIS — R10.11 RUQ PAIN: ICD-10-CM

## 2021-03-10 LAB
ALBUMIN SERPL-MCNC: 4.2 G/DL (ref 3.5–5)
ALBUMIN/GLOB SERPL: 1.2 {RATIO} (ref 1.1–2.2)
ALP SERPL-CCNC: 104 U/L (ref 45–117)
ALT SERPL-CCNC: 27 U/L (ref 12–78)
ANION GAP SERPL CALC-SCNC: 8 MMOL/L (ref 5–15)
AST SERPL-CCNC: 17 U/L (ref 15–37)
BILIRUB SERPL-MCNC: 0.6 MG/DL (ref 0.2–1)
BUN SERPL-MCNC: 9 MG/DL (ref 6–20)
BUN/CREAT SERPL: 12 (ref 12–20)
CALCIUM SERPL-MCNC: 9.6 MG/DL (ref 8.5–10.1)
CHLORIDE SERPL-SCNC: 107 MMOL/L (ref 97–108)
CHOLEST SERPL-MCNC: 150 MG/DL
CO2 SERPL-SCNC: 27 MMOL/L (ref 21–32)
CREAT SERPL-MCNC: 0.76 MG/DL (ref 0.7–1.3)
CREAT UR-MCNC: 202 MG/DL
ERYTHROCYTE [DISTWIDTH] IN BLOOD BY AUTOMATED COUNT: 15.9 % (ref 11.5–14.5)
EST. AVERAGE GLUCOSE BLD GHB EST-MCNC: 154 MG/DL
GLOBULIN SER CALC-MCNC: 3.4 G/DL (ref 2–4)
GLUCOSE SERPL-MCNC: 112 MG/DL (ref 65–100)
HBA1C MFR BLD: 7 % (ref 4–5.6)
HCT VFR BLD AUTO: 39.6 % (ref 36.6–50.3)
HDLC SERPL-MCNC: 73 MG/DL
HDLC SERPL: 2.1 {RATIO} (ref 0–5)
HGB BLD-MCNC: 13.4 G/DL (ref 12.1–17)
LDLC SERPL CALC-MCNC: 64.2 MG/DL (ref 0–100)
LIPASE SERPL-CCNC: 182 U/L (ref 73–393)
LIPID PROFILE,FLP: NORMAL
MCH RBC QN AUTO: 25.9 PG (ref 26–34)
MCHC RBC AUTO-ENTMCNC: 33.8 G/DL (ref 30–36.5)
MCV RBC AUTO: 76.6 FL (ref 80–99)
MICROALBUMIN UR-MCNC: 8.01 MG/DL
MICROALBUMIN/CREAT UR-RTO: 40 MG/G (ref 0–30)
NRBC # BLD: 0 K/UL (ref 0–0.01)
NRBC BLD-RTO: 0 PER 100 WBC
PLATELET # BLD AUTO: 347 K/UL (ref 150–400)
PMV BLD AUTO: 10.6 FL (ref 8.9–12.9)
POTASSIUM SERPL-SCNC: 4.1 MMOL/L (ref 3.5–5.1)
PROT SERPL-MCNC: 7.6 G/DL (ref 6.4–8.2)
RBC # BLD AUTO: 5.17 M/UL (ref 4.1–5.7)
SODIUM SERPL-SCNC: 142 MMOL/L (ref 136–145)
TRIGL SERPL-MCNC: 64 MG/DL (ref ?–150)
VLDLC SERPL CALC-MCNC: 12.8 MG/DL
WBC # BLD AUTO: 7 K/UL (ref 4.1–11.1)

## 2021-03-11 LAB
HCV AB S/CO SERPL IA: <0.1 S/CO RATIO (ref 0–0.9)
HCV AB SERPL QL IA: NORMAL
PSA SERPL-MCNC: 0.6 NG/ML (ref 0–4)
REFLEX CRITERIA: NORMAL

## 2021-03-15 ENCOUNTER — OFFICE VISIT (OUTPATIENT)
Dept: FAMILY MEDICINE CLINIC | Age: 61
End: 2021-03-15
Payer: MEDICAID

## 2021-03-15 VITALS
WEIGHT: 180.8 LBS | TEMPERATURE: 98.4 F | OXYGEN SATURATION: 98 % | HEART RATE: 85 BPM | SYSTOLIC BLOOD PRESSURE: 118 MMHG | HEIGHT: 66 IN | DIASTOLIC BLOOD PRESSURE: 76 MMHG | RESPIRATION RATE: 16 BRPM | BODY MASS INDEX: 29.06 KG/M2

## 2021-03-15 DIAGNOSIS — G47.33 OSA (OBSTRUCTIVE SLEEP APNEA): ICD-10-CM

## 2021-03-15 DIAGNOSIS — M79.605 PAIN IN BOTH LOWER EXTREMITIES: ICD-10-CM

## 2021-03-15 DIAGNOSIS — E11.65 TYPE 2 DIABETES MELLITUS WITH HYPERGLYCEMIA, WITHOUT LONG-TERM CURRENT USE OF INSULIN (HCC): Primary | ICD-10-CM

## 2021-03-15 DIAGNOSIS — M54.50 CHRONIC MIDLINE LOW BACK PAIN WITHOUT SCIATICA: ICD-10-CM

## 2021-03-15 DIAGNOSIS — G89.29 CHRONIC MIDLINE LOW BACK PAIN WITHOUT SCIATICA: ICD-10-CM

## 2021-03-15 DIAGNOSIS — N52.9 ERECTILE DYSFUNCTION, UNSPECIFIED ERECTILE DYSFUNCTION TYPE: ICD-10-CM

## 2021-03-15 DIAGNOSIS — R80.9 MICROALBUMINURIA: ICD-10-CM

## 2021-03-15 DIAGNOSIS — R10.11 RUQ PAIN: ICD-10-CM

## 2021-03-15 DIAGNOSIS — M25.511 CHRONIC RIGHT SHOULDER PAIN: ICD-10-CM

## 2021-03-15 DIAGNOSIS — R71.8 LOW MEAN CORPUSCULAR VOLUME (MCV): ICD-10-CM

## 2021-03-15 DIAGNOSIS — I25.10 CORONARY ARTERY DISEASE INVOLVING NATIVE CORONARY ARTERY OF NATIVE HEART WITHOUT ANGINA PECTORIS: ICD-10-CM

## 2021-03-15 DIAGNOSIS — M79.604 PAIN IN BOTH LOWER EXTREMITIES: ICD-10-CM

## 2021-03-15 DIAGNOSIS — E78.00 HYPERCHOLESTEREMIA: ICD-10-CM

## 2021-03-15 DIAGNOSIS — G89.29 CHRONIC RIGHT SHOULDER PAIN: ICD-10-CM

## 2021-03-15 PROCEDURE — 3051F HG A1C>EQUAL 7.0%<8.0%: CPT | Performed by: FAMILY MEDICINE

## 2021-03-15 PROCEDURE — 99214 OFFICE O/P EST MOD 30 MIN: CPT | Performed by: FAMILY MEDICINE

## 2021-03-15 NOTE — PROGRESS NOTES
Samaria Daniel  64 y.o. male  1960  [address]  [id number]     [facility]       Encounter Date: 3/15/2021           Established Patient Visit Note: Lillie Deras MD    Reason for Appointment:  Chief Complaint   Patient presents with    Results       History of Present Illness:  History provided by patient    Samaria Daniel is a 64 y.o. male who presents to clinic today for:    MCV: He reports being told that he has sickle cell trait. He also endorses having lead poisoining as a child that caused anemia. 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 Right Shoulder Pain: he reports that this has resolved  HLD: last lipids at goal, doing well on statin with no side effects  RUQ Pain: he continues to have this pain; he has not yet obtain US of abdomen that was ordered at last visit; recent labs were unrevealing  JAVIER: he reports that he used to have a CPAP, but he has gone without treatment since around 2009  CAD: recently saw cardiology, no new symptoms  GERD: reports as stable      Review of Systems  Review of Systems   Constitutional: Negative for chills and fever. Respiratory: Negative for cough, shortness of breath and wheezing. Cardiovascular: Negative for chest pain and palpitations. Allergies: Patient has no known allergies. Medications: (Updated to reflect final medication list after visit)    Current Outpatient Medications:     metoprolol succinate (TOPROL-XL) 50 mg XL tablet, Take 1 Tab by mouth daily. , Disp: 90 Tab, Rfl: 3    atorvastatin (LIPITOR) 80 mg tablet, Take 1 Tab by mouth daily. , Disp: 90 Tab, Rfl: 3    amLODIPine (NORVASC) 5 mg tablet, Take 1 Tab by mouth daily. , Disp: 90 Tab, Rfl: 3    clopidogreL (Plavix) 75 mg tab, Take 1 Tab by mouth daily. , Disp: 90 Tab, Rfl: 3    pantoprazole (PROTONIX) 40 mg tablet, Take 40 mg by mouth daily. , Disp: , Rfl:     lisinopriL (PRINIVIL, ZESTRIL) 5 mg tablet, Take 1 Tab by mouth daily. , Disp: , Rfl:     aspirin delayed-release 81 mg tablet, Take  by mouth daily. , Disp: , Rfl:     famotidine (PEPCID) 20 mg tablet, Take 20 mg by mouth daily. , Disp: , Rfl:     nitroglycerin (NITROSTAT) 0.4 mg SL tablet, 0.4 mg by SubLINGual route every five (5) minutes as needed for Chest Pain. Up to 3 doses. , Disp: , Rfl:     metFORMIN (GLUCOPHAGE) 500 mg tablet, Take 1 Tab by mouth two (2) times daily (with meals). , Disp: 60 Tab, Rfl: 6    History  Patient Care Team:  Jackie Wiley MD as PCP - General (Family Medicine)  Jackie Wiley MD as PCP - [facility]    Past Medical History: he has a past medical history of Asymmetric septal hypertrophy ([facility] (9/8/2020), Coronary artery disease involving native coronary artery of native heart without angina pectoris (8/4/2020), Gastroesophageal reflux disease (4/9/2020), Hypertension, JAVIER (obstructive sleep apnea) (4/9/2020), Tobacco abuse (11/24/2018), and Type 2 diabetes mellitus with hyperglycemia, without long-term current use of insulin ([facility] (5/13/2020). Past Surgical History: he has no past surgical history on file. Family Medical History: family history includes Diabetes in his maternal grandfather, mother, and sister; Hypertension in his maternal grandfather, mother, and sister. Social History: he reports that he has been smoking. He has a 5.00 pack-year smoking history. He has never used smokeless tobacco. He reports previous alcohol use of about 2.0 standard drinks of alcohol per week. He reports that he does not use drugs.     Health Maintenance Due   Topic Date Due    Foot Exam Q1  Never done    Eye Exam Retinal or Dilated  Never done    Colorectal Cancer Screening Combo  Never done       Objective:   Visit Vitals  /76 (BP 1 Location: Left upper arm, BP Patient Position: Sitting)   Pulse 85   Temp 98.4 °F (36.9 °C) (Oral)   Resp 16   Ht 5' 6\" (1.676 m)   Wt 180 lb 12.8 oz (82 kg)   SpO2 98%   BMI 29.18 kg/m²     Wt Readings from Last 3 Encounters:   03/15/21 180 lb 12.8 oz (82 kg)   03/08/21 177 lb (80.3 kg)   02/02/21 175 lb 3.2 oz (79.5 kg)       Physical Exam  Constitutional:       Appearance: Normal appearance. HENT:      Head: Normocephalic and atraumatic. Right Ear: External ear normal.      Left Ear: External ear normal.      Nose: Nose normal.      Mouth/Throat:      Pharynx: No oropharyngeal exudate. Eyes:      General: No scleral icterus. Right eye: No discharge. Left eye: No discharge. Conjunctiva/sclera: Conjunctivae normal.      Pupils: Pupils are equal, round, and reactive to light. Neck:      Musculoskeletal: Normal range of motion and neck supple. Thyroid: No thyromegaly. Vascular: No JVD. Trachea: No tracheal deviation. Cardiovascular:      Rate and Rhythm: Normal rate and regular rhythm. Heart sounds: Murmur (known) present. Systolic murmur present with a grade of 3/6. No friction rub. No gallop. Pulmonary:      Effort: Pulmonary effort is normal. No respiratory distress. Breath sounds: Normal breath sounds. No stridor. No wheezing. Musculoskeletal: Normal range of motion. General: No tenderness or deformity. Lymphadenopathy:      Cervical: No cervical adenopathy. Skin:     General: Skin is warm and dry. Neurological:      Mental Status: He is alert. Cranial Nerves: No cranial nerve deficit. Coordination: Coordination normal.      Gait: Gait is intact.       Deep Tendon Reflexes: Reflexes normal.       Assessment & Plan: ICD-10-CM ICD-9-CM    1. Type 2 diabetes mellitus with hyperglycemia, without long-term current use of insulin ([facility])  E11.65 250.00 canagliflozin (INVOKANA) 100 mg tablet     781.36 METABOLIC PANEL, COMPREHENSIVE      CBC W/O DIFF      HEMOGLOBIN A1C WITH EAG   2. Microalbuminuria  R80.9 791.0    3. Erectile dysfunction, unspecified erectile dysfunction type  N52.9 607.84    4. RUQ pain  R10.11 789.01    5. JAVIER (obstructive sleep apnea)  G47.33 327.23 SLEEP MEDICINE REFERRAL   6. Low mean corpuscular volume (MCV)  R71.8 790.09 FERRITIN      IRON PROFILE      SICKLE CELL SCREEN      ALPHA THALASSEMIA      METABOLIC PANEL, COMPREHENSIVE      CBC W/O DIFF      PERIPHERAL SMEAR   7. Pain in both lower extremities  M79.604 729.5     M79.605     8. Chronic midline low back pain without sciatica  M54.5 724.2     G89.29 338.29    9. Chronic right shoulder pain  M25.511 719.41     G89.29 338.29    10. Hypercholesteremia  E78.00 272.0    11. Coronary artery disease involving native coronary artery of native heart without angina pectoris  I25.10 414.01      · DM2: Chronic, A1c above goal. discussed medication options and associated risks/benefits/side effects/precautions; patient would like to try  Invokana. Will start and RTC 6 weeks to assess progress. · Microalbuminuria: New problem. Continue to monitor and address diabetes as above. · ED: Chronic, uncontrolled. discussed medication options and associated risks/benefits/side effects/precautions; patient would like to try Cialis. Will reach out to patient's cardiologist to ensure that it is safe given his heart history. If safe, will prescribe. · RUQ pain: Chronic, uncontrolled. Obtain US as previously discussed. · JAVIER: Chronic, uncontrolled. See orders  · Low MCV: New problem. Check labs as above. · LE Pain: Chronic, uncontrolled. Obtain CHARLENE as previously discussed. · CAD: Chronic, stable. Follow up with cardiology as scheduled.  Discussed red flag symptoms and reasons to call or go to ED  · GERD: Chronic, stable. I have discussed the diagnosis with the patient and the intended plan as seen in the above orders. The patient has received an after-visit summary along with patient information handout. I have discussed medication side effects and warnings with the patient as well. Disposition  Follow-up and Dispositions    · Return in about 6 weeks (around 4/26/2021) for obtain labs prior to visit.            Lucia Pelletier MD

## 2021-03-15 NOTE — PROGRESS NOTES
Chief Complaint   Patient presents with    Results     1. Have you been to the ER, urgent care clinic since your last visit? Hospitalized since your last visit? No    2. Have you seen or consulted any other health care providers outside of the [address] [place] [address] since your last visit? Include any pap smears or colon screening.  Yes Where: [facility] last week     Was told by GI that he need clearance from cardiologist.   Due foot exam

## 2021-03-18 ENCOUNTER — TELEPHONE (OUTPATIENT)
Dept: FAMILY MEDICINE CLINIC | Age: 61
End: 2021-03-18

## 2021-03-18 DIAGNOSIS — N52.9 ERECTILE DYSFUNCTION, UNSPECIFIED ERECTILE DYSFUNCTION TYPE: Primary | ICD-10-CM

## 2021-03-18 NOTE — TELEPHONE ENCOUNTER
Patient called inquiring whether Dr. Torito Rm has conversed with his cardiologist.   
 
Patient is requesting a call back. [facility]#[phone]

## 2021-03-18 NOTE — TELEPHONE ENCOUNTER
Dr. Casa Crane, 
 
Mr Henry Kruger is followed by you for CAD. He is interested in starting medications (Viagra or Cialis) for erectile dysfunction. Are you okay with him taking these medications?  
 
Lillie Deras MD

## 2021-03-19 RX ORDER — TADALAFIL 5 MG/1
TABLET ORAL
Qty: 15 TAB | Refills: 2 | Status: SHIPPED | OUTPATIENT
Start: 2021-03-19 | End: 2021-07-12 | Stop reason: SDUPTHER

## 2021-03-19 NOTE — TELEPHONE ENCOUNTER
Called patient. He agrees not to take nitroglycerin while taking the Cialis. Discussed red flag symptoms and reasons to call or go to ED 
 
Rob Cook MD 
 
  ICD-10-CM ICD-9-CM   
1. Erectile dysfunction, unspecified erectile dysfunction type  N52.9 607.84 tadalafiL (Cialis) 5 mg tablet

## 2021-03-19 NOTE — TELEPHONE ENCOUNTER
----- Message from Justina Cabrera MD sent at 3/18/2021  6:02 PM EDT ----- Regarding: RE: ED medications Yes that is fine, no active angina and just give him usual precautions about SL NTG 
thanks 
----- Message ----- From: Kristy Herring MD 
Sent: 3/15/2021   2:22 PM EDT To: Justina Cabrera MD 
Subject: ED medications Dr. Emily Reyes, This patient is followed by you for CAD. He is interested in starting medications for erectile dysfunction. Are you okay with him taking Viagra or Cialis? Thank you so much, and have a good day.   
 
Rogelio Leigh MD

## 2021-03-22 RX ORDER — METFORMIN HYDROCHLORIDE 500 MG/1
500 TABLET ORAL 2 TIMES DAILY WITH MEALS
Qty: 60 TAB | Refills: 6 | Status: SHIPPED | OUTPATIENT
Start: 2021-03-22 | End: 2022-07-18

## 2021-03-22 NOTE — TELEPHONE ENCOUNTER
Last Visit: 3/15/21 MD Jignesh Weller  Next Appointment: 4/16/21 labs, 4/19/21 MD Jignesh Weller   Previous Refill Encounter(s): 5/14/20 60 + 6    Requested Prescriptions     Pending Prescriptions Disp Refills    metFORMIN (GLUCOPHAGE) 500 mg tablet 60 Tab 6     Sig: Take 1 Tab by mouth two (2) times daily (with meals).

## 2021-06-08 ENCOUNTER — HOSPITAL ENCOUNTER (OUTPATIENT)
Dept: SLEEP MEDICINE | Age: 61
Discharge: HOME | End: 2021-06-08
Payer: MEDICAID

## 2021-06-08 ENCOUNTER — OFFICE VISIT (OUTPATIENT)
Dept: SLEEP MEDICINE | Age: 61
End: 2021-06-08
Payer: MEDICAID

## 2021-06-08 VITALS
BODY MASS INDEX: 29.73 KG/M2 | DIASTOLIC BLOOD PRESSURE: 85 MMHG | HEART RATE: 86 BPM | HEIGHT: 66 IN | WEIGHT: 185 LBS | OXYGEN SATURATION: 100 % | TEMPERATURE: 98.9 F | SYSTOLIC BLOOD PRESSURE: 125 MMHG

## 2021-06-08 DIAGNOSIS — G47.33 OSA (OBSTRUCTIVE SLEEP APNEA): Primary | ICD-10-CM

## 2021-06-08 DIAGNOSIS — I10 ESSENTIAL HYPERTENSION: ICD-10-CM

## 2021-06-08 PROCEDURE — 95806 SLEEP STUDY UNATT&RESP EFFT: CPT | Performed by: INTERNAL MEDICINE

## 2021-06-08 PROCEDURE — 99204 OFFICE O/P NEW MOD 45 MIN: CPT | Performed by: INTERNAL MEDICINE
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
## 2021-06-08 NOTE — PATIENT INSTRUCTIONS
[address] [facility] [place]. [address] [facility], [address] Tel.  [phone] Fax. 100 [facility] 60 [place], [address] [facility] Tel.  [phone] Fax. [phone] [address] [employer] [address]elin Diaz Tel.  [phone] Fax. [phone] Sleep Apnea: After Your Visit Your Care Instructions Sleep apnea occurs when you frequently stop breathing for 10 seconds or longer during sleep. It can be mild to severe, based on the number of times per hour that you stop breathing or have slowed breathing. Blocked or narrowed airways in your nose, mouth, or throat can cause sleep apnea. Your airway can become blocked when your throat muscles and tongue relax during sleep. Sleep apnea is common, occurring in 1 out of 20 individuals. Individuals having any of the following characteristics should be evaluated and treated right away due to high risk and detrimental consequences from untreated sleep apnea: 
1. Obesity 2. Congestive Heart failure 3. Atrial Fibrillation 4. Uncontrolled Hypertension 5. Type II Diabetes 6. Night-time Arrhythmias 7. Stroke 8. Pulmonary Hypertension 9. High-risk Driving Populations (pilots, truck drivers, etc.) 10. Patients Considering Weight-loss Surgery How do you know you have sleep apnea? You probably have sleep apnea if you answer 'yes' to 3 or more of the following questions: S - Have you been told that you Snore? T - Are you often Tired during the day? O - Has anyone Observed you stop breathing while sleeping? P- Do you have (or are being treated for) high blood Pressure? B - Are you obese (Body Mass Index > 35)? A - Is your Age 48years old or older? N - Is your Neck size greater than 16 inches? G - Are you male Gender? A sleep physician can prescribe a breathing device that prevents tissues in the throat from blocking your airway.  Or your doctor may recommend using a dental device (oral breathing device) to help keep your airway open. In some cases, surgery may be needed to remove enlarged tissues in the throat. Follow-up care is a key part of your treatment and safety. Be sure to make and go to all appointments, and call your doctor if you are having problems. It's also a good idea to know your test results and keep a list of the medicines you take. How can you care for yourself at home? · Lose weight, if needed. It may reduce the number of times you stop breathing or have slowed breathing. · Go to bed at the same time every night. · Sleep on your side. It may stop mild apnea. If you tend to roll onto your back, sew a pocket in the back of your pajama top. Put a tennis ball into the pocket, and stitch the pocket shut. This will help keep you from sleeping on your back. · Avoid alcohol and medicines such as sleeping pills and sedatives before bed. · Do not smoke. Smoking can make sleep apnea worse. If you need help quitting, talk to your doctor about stop-smoking programs and medicines. These can increase your chances of quitting for good. · Prop up the head of your bed 4 to 6 inches by putting bricks under the legs of the bed. · Treat breathing problems, such as a stuffy nose, caused by a cold or allergies. · Use a continuous positive airway pressure (CPAP) breathing machine if lifestyle changes do not help your apnea and your doctor recommends it. The machine keeps your airway from closing when you sleep. · If CPAP does not help you, ask your doctor whether you should try other breathing machines. A bilevel positive airway pressure machine has two types of air pressureâone for breathing in and one for breathing out. Another device raises or lowers air pressure as needed while you breathe. · If your nose feels dry or bleeds when using one of these machines, talk with your doctor about increasing moisture in the air. A humidifier may help.  
· If your nose is runny or stuffy from using a breathing machine, talk with your doctor about using decongestants or a corticosteroid nasal spray. When should you call for help? Watch closely for changes in your health, and be sure to contact your doctor if: 
· You still have sleep apnea even though you have made lifestyle changes. · You are thinking of trying a device such as CPAP. · You are having problems using a CPAP or similar machine. Where can you learn more? Go to [employer]. Enter U626 in the search box to learn more about \"Sleep Apnea: After Your Visit. \"  
© 6085-2259 Healthwise, Incorporated. Care instructions adapted under license by [employer] (which disclaims liability or warranty for this information). This care instruction is for use with your licensed healthcare professional. If you have questions about a medical condition or this instruction, always ask your healthcare professional. Loulou Siddiquis any warranty or liability for your use of this information. PROPER SLEEP HYGIENE What to avoid · Do not have drinks with caffeine, such as coffee or black tea, for 8 hours before bed. · Do not smoke or use other types of tobacco near bedtime. Nicotine is a stimulant and can keep you awake. · Avoid drinking alcohol late in the evening, because it can cause you to wake in the middle of the night. · Do not eat a big meal close to bedtime. If you are hungry, eat a light snack. · Do not drink a lot of water close to bedtime, because the need to urinate may wake you up during the night. · Do not read or watch TV in bed. Use the bed only for sleeping and sexual activity. What to try · Go to bed at the same time every night, and wake up at the same time every morning. Do not take naps during the day. · Keep your bedroom quiet, dark, and cool. · Get regular exercise, but not within 3 to 4 hours of your bedtime. Opal Gillis · Sleep on a comfortable pillow and mattress.  
· If watching the clock makes you anxious, turn it facing away from you so you cannot see the time. · If you worry when you lie down, start a worry book. Well before bedtime, write down your worries, and then set the book and your concerns aside. · Try meditation or other relaxation techniques before you go to bed. · If you cannot fall asleep, get up and go to another room until you feel sleepy. Do something relaxing. Repeat your bedtime routine before you go to bed again. · Make your house quiet and calm about an hour before bedtime. Turn down the lights, turn off the TV, log off the computer, and turn down the volume on music. This can help you relax after a busy day. Drowsy Driving The Scaling[employer] cites drowsiness as a causing factor in more than 082,730 police reported crashes annually, resulting in 76,000 injuries and 1,500 deaths. Other surveys suggest 55% of people polled have driven while drowsy in the past year, 23% had fallen asleep but not crashed, 3% crashed, and 2% had and accident due to drowsy driving. Who is at risk? Young Drivers: One study of drowsy driving accidents states that 55% of the drivers were under 25 years. Of those, 75% were male. Shift Workers and Travelers: People who work overnight or travel across time zones frequently are at higher risk of experiencing Circadian Rhythm Disorders. They are trying to work and function when their body is programed to sleep. Sleep Deprived: Lack of sleep has a serious impact on your ability to pay attention or focus on a task. Consistently getting less than the average of 8 hours your body needs creates partial or cumulative sleep deprivation. Untreated Sleep Disorders: Sleep Apnea, Narcolepsy, R.L.S., and other sleep disorders (untreated) prevent a person from getting enough restful sleep. This leads to excessive daytime sleepiness and increases the risk for drowsy driving accidents by up to 7 times.  
Medications / Alcohol: Even over the counter medications can cause drowsiness. Medications that impair a drivers attention should have a warning label. Alcohol naturally makes you sleepy and on its own can cause accidents. Combined with excessive drowsiness its effects are amplified. Signs of Drowsy Driving: * You don't remember driving the last few miles * You may drift out of your vilma * You are unable to focus and your thoughts wander * You may yawn more often than normal 
 * You have difficulty keeping your eyes open / nodding off * Missing traffic signs, speeding, or tailgating Prevention-  
Good sleep hygiene, lifestyle and behavioral choices have the most impact on drowsy driving. There is no substitute for sleep and the average person requires 8 hours nightly. If you find yourself driving drowsy, stop and sleep. Consider the sleep hygiene tips provided during your visit as well. Medication Refill Policy: Refills for all medications require 1 week advance notice. Please have your pharmacy fax a refill request. We are unable to fax, or call in \"controled substance\" medications and you will need to pick these prescriptions up from our office. IPXI Activation Thank you for requesting access to IPXI. Please follow the instructions below to securely access and download your online medical record. IPXI allows you to send messages to your doctor, view your test results, renew your prescriptions, schedule appointments, and more. How Do I Sign Up? 1. In your internet browser, go to [email or web address]. 2. Click on the First Time User? Click Here link in the Sign In box. You will see the New Member Sign Up page. 3. Enter your IPXI Access Code exactly as it appears below. You will not need to use this code after youve completed the sign-up process. If you do not sign up before the expiration date, you must request a new code. IPXI Access Code: Activation code not generated Current IPXI Status: Active (This is the date your InterEx access code will ) 4. Enter the last four digits of your Social Security Number (xxxx) and Date of Birth (mm/dd/yyyy) as indicated and click Submit. You will be taken to the next sign-up page. 5. Create a InterEx ID. This will be your InterEx login ID and cannot be changed, so think of one that is secure and easy to remember. 6. Create a InterEx password. You can change your password at any time. 7. Enter your Password Reset Question and Answer. This can be used at a later time if you forget your password. 8. Enter your e-mail address. You will receive e-mail notification when new information is available in [address] 9. Click Sign Up. You can now view and download portions of your medical record. 10. Click the Download Summary menu link to download a portable copy of your medical information. Additional Information If you have questions, please call [phone]. Remember, InterEx is NOT to be used for urgent needs. For medical emergencies, dial 911.

## 2021-06-17 ENCOUNTER — DOCUMENTATION ONLY (OUTPATIENT)
Dept: SLEEP MEDICINE | Age: 61
End: 2021-06-17

## 2021-06-17 ENCOUNTER — TELEPHONE (OUTPATIENT)
Dept: SLEEP MEDICINE | Age: 61
End: 2021-06-17

## 2021-06-17 DIAGNOSIS — G47.33 OSA (OBSTRUCTIVE SLEEP APNEA): Primary | ICD-10-CM

## 2021-06-17 NOTE — TELEPHONE ENCOUNTER
Jennifer Arrington is to be contacted by lead sleep technologist regarding results of Sleep Testing which was indicative of an average AHI of 54 per hour with an SpO2 sol of 77% and SpO2 of < 88% being 17.6 minutes. An APAP prescription has been written and patient will be contacted by office staff regarding follow-up  in 2-3 months after initiation of therapy. Encounter Diagnosis   Name Primary?  JAVIER (obstructive sleep apnea) Yes       Orders Placed This Encounter    AMB SUPPLY ORDER     Diagnosis: Obstructive Sleep Apnea ICD-10 Code (G47.33)    Positive Airway Pressure Therapy: Duration of need: 99 months. ResMed APAP Device with Heated Humidifer [id number] / [id number]. Minimum Pressure: 4 cmH2O, Maximum Pressure: 20 cmH2O.  Nasal Cushion (Replace) 2 per month.  Nasal Interface Mask 1 every 3 months.  Headgear 1 every 6 months.  Filter(s) Disposable 2 per month.  Filter(s) Non-Disposable 1 every 6 months. [address] for Locked Keo (Replace) 1 every 6 months.  Tubing with heating element 1 every 3 months. Perform Mask Fitting per patient preference and comfort - replace as above. Jodee Syed MD, FAA; NPI: [id number]  Electronically signed. 06/17/21

## 2021-07-06 ENCOUNTER — TELEPHONE (OUTPATIENT)
Dept: SLEEP MEDICINE | Age: 61
End: 2021-07-06

## 2021-07-06 ENCOUNTER — DOCUMENTATION ONLY (OUTPATIENT)
Dept: SLEEP MEDICINE | Age: 61
End: 2021-07-06

## 2021-07-06 NOTE — TELEPHONE ENCOUNTER
Patient called stated [facility] doesn't take his insurance, Faxed pap order to [address] [place] [address]

## 2021-07-12 ENCOUNTER — OFFICE VISIT (OUTPATIENT)
Dept: FAMILY MEDICINE CLINIC | Age: 61
End: 2021-07-12
Payer: MEDICAID

## 2021-07-12 VITALS
HEART RATE: 82 BPM | SYSTOLIC BLOOD PRESSURE: 112 MMHG | BODY MASS INDEX: 29.28 KG/M2 | HEIGHT: 66 IN | DIASTOLIC BLOOD PRESSURE: 74 MMHG | WEIGHT: 182.2 LBS | OXYGEN SATURATION: 98 % | TEMPERATURE: 98.2 F | RESPIRATION RATE: 16 BRPM

## 2021-07-12 DIAGNOSIS — K21.9 GASTROESOPHAGEAL REFLUX DISEASE, UNSPECIFIED WHETHER ESOPHAGITIS PRESENT: ICD-10-CM

## 2021-07-12 DIAGNOSIS — E78.00 HYPERCHOLESTEREMIA: ICD-10-CM

## 2021-07-12 DIAGNOSIS — R10.11 RUQ PAIN: ICD-10-CM

## 2021-07-12 DIAGNOSIS — R71.8 LOW MEAN CORPUSCULAR VOLUME (MCV): ICD-10-CM

## 2021-07-12 DIAGNOSIS — E11.65 TYPE 2 DIABETES MELLITUS WITH HYPERGLYCEMIA, WITHOUT LONG-TERM CURRENT USE OF INSULIN (HCC): Primary | ICD-10-CM

## 2021-07-12 DIAGNOSIS — N52.9 ERECTILE DYSFUNCTION, UNSPECIFIED ERECTILE DYSFUNCTION TYPE: ICD-10-CM

## 2021-07-12 DIAGNOSIS — M79.605 PAIN IN BOTH LOWER EXTREMITIES: ICD-10-CM

## 2021-07-12 DIAGNOSIS — G47.33 OSA (OBSTRUCTIVE SLEEP APNEA): ICD-10-CM

## 2021-07-12 DIAGNOSIS — I25.10 CORONARY ARTERY DISEASE INVOLVING NATIVE CORONARY ARTERY OF NATIVE HEART WITHOUT ANGINA PECTORIS: ICD-10-CM

## 2021-07-12 DIAGNOSIS — M79.604 PAIN IN BOTH LOWER EXTREMITIES: ICD-10-CM

## 2021-07-12 PROCEDURE — 99213 OFFICE O/P EST LOW 20 MIN: CPT | Performed by: FAMILY MEDICINE

## 2021-07-12 PROCEDURE — 3051F HG A1C>EQUAL 7.0%<8.0%: CPT | Performed by: FAMILY MEDICINE

## 2021-07-12 RX ORDER — LISINOPRIL 5 MG/1
5 TABLET ORAL DAILY
Qty: 90 TABLET | Refills: 1 | Status: SHIPPED | OUTPATIENT
Start: 2021-07-12 | End: 2022-03-09 | Stop reason: SDUPTHER

## 2021-07-12 RX ORDER — ATORVASTATIN CALCIUM 80 MG/1
80 TABLET, FILM COATED ORAL DAILY
Qty: 90 TABLET | Refills: 3 | Status: SHIPPED | OUTPATIENT
Start: 2021-07-12 | End: 2022-07-27 | Stop reason: SDUPTHER

## 2021-07-12 RX ORDER — TADALAFIL 10 MG/1
TABLET ORAL
Qty: 30 TABLET | Refills: 2 | Status: SHIPPED | OUTPATIENT
Start: 2021-07-12 | End: 2022-01-04 | Stop reason: SDUPTHER

## 2021-07-12 NOTE — PROGRESS NOTES
Chief Complaint   Patient presents with    Medication Evaluation     ed med not working     1. Have you been to the ER, urgent care clinic since your last visit? Hospitalized since your last visit? No    2. Have you seen or consulted any other health care providers outside of the [address] [place] [address] since your last visit? Include any pap smears or colon screening.  Yes Where: sleep MD Dr Hazel Borden    Had eye exam 2-3 months ago, can't remember who did it    Hasn't had colonoscopy yet    Due foot exam

## 2021-07-12 NOTE — PROGRESS NOTES
Sandra Carter  64 y.o. male  1960  [address]  [id number]     [facility]       Encounter Date: 7/12/2021           Established Patient Visit Note: Benoit Canela MD    Reason for Appointment:  Chief Complaint   Patient presents with    Medication Evaluation     ed med not working       History of Present Illness:  History provided by patient    Sandra Carter is a 64 y.o. male who presents to clinic today for:      · DM2: started on Inovkana at last visit, reports doing well, not check BG at home. He does have microalbuminuria with last microalbumin:creat ratio of 40 in March, 2021. Last A1c 7.0 in March, 2021  · ED: patient started on Cialis at last visit, but he reports that this has not helped  · RUQ Pain: US ordered in Feb, 2021, but he has not yet scheduled this d/t \"It goes away for a period of time\"  · JAVIER: patient saw SM and he is awiting CPAP  · LE Pain: he reports that this has been okay, CHARLENE ordered at last visit, but he has not done this d/t \"it has been okay recently\"  · CAD: denies any recent dyspnea or CP  · GERD: he reports as wellc ontrolled  · Low MCV: labs ordered at last visit, but he has not yet had these done        Review of Systems  Review of Systems   Constitutional: Negative for chills and fever. Respiratory: Negative for cough, shortness of breath and wheezing. Cardiovascular: Negative for chest pain and palpitations. Allergies: Patient has no known allergies. Medications: (Updated to reflect final medication list after visit)    Current Outpatient Medications:     tadalafiL (Cialis) 10 mg tablet, Take 1 to 2 tabs at least 30 minutes prior to anticipated sexual activity as one single dose and not more than once daily. , Disp: 30 Tablet, Rfl: 2    atorvastatin (LIPITOR) 80 mg tablet, Take 1 Tablet by mouth daily. , Disp: 90 Tablet, Rfl: 3    canagliflozin (INVOKANA) 100 mg tablet, Take 1 Tablet by mouth Daily (before breakfast). , Disp: 90 Tablet, Rfl: 1    lisinopriL (PRINIVIL, ZESTRIL) 5 mg tablet, Take 1 Tablet by mouth daily. , Disp: 90 Tablet, Rfl: 1    metFORMIN (GLUCOPHAGE) 500 mg tablet, Take 1 Tab by mouth two (2) times daily (with meals). , Disp: 60 Tab, Rfl: 6    metoprolol succinate (TOPROL-XL) 50 mg XL tablet, Take 1 Tab by mouth daily. , Disp: 90 Tab, Rfl: 3    amLODIPine (NORVASC) 5 mg tablet, Take 1 Tab by mouth daily. , Disp: 90 Tab, Rfl: 3    aspirin delayed-release 81 mg tablet, Take  by mouth daily. , Disp: , Rfl:     nitroglycerin (NITROSTAT) 0.4 mg SL tablet, 0.4 mg by SubLINGual route every five (5) minutes as needed for Chest Pain. Up to 3 doses. , Disp: , Rfl:     History  Patient Care Team:  Derrek Schmitt MD as PCP - General (Family Medicine)  Derrek Schmitt MD as PCP - [facility]    Past Medical History: he has a past medical history of Asymmetric septal hypertrophy ([facility] (9/8/2020), Coronary artery disease involving native coronary artery of native heart without angina pectoris (8/4/2020), Gastroesophageal reflux disease (4/9/2020), Hypertension, JAVIER (obstructive sleep apnea) (4/9/2020), Tobacco abuse (11/24/2018), and Type 2 diabetes mellitus with hyperglycemia, without long-term current use of insulin ([facility] [address] (5/13/2020). Past Surgical History: he has no past surgical history on file. Family Medical History: family history includes Diabetes in his maternal grandfather, mother, and sister; Hypertension in his maternal grandfather, mother, and sister. Social History: he reports that he has been smoking. He has a 5.00 pack-year smoking history. He has never used smokeless tobacco. He reports previous alcohol use of about 2.0 standard drinks of alcohol per week. He reports that he does not use drugs.     Health Maintenance Due   Topic Date Due    Foot Exam Q1  Never done    Eye Exam Retinal or Dilated  Never done    Colorectal Cancer Screening Combo  Never done    Shingrix Vaccine Age 50> (1 of 2) Never done       Objective:   Visit Vitals  /74 (BP 1 Location: Left upper arm, BP Patient Position: Sitting, BP Cuff Size: Adult)   Pulse 82   Temp 98.2 °F (36.8 °C) (Oral)   Resp 16   Ht 5' 6\" (1.676 m)   Wt 182 lb 3.2 oz (82.6 kg)   SpO2 98%   BMI 29.41 kg/m²     Wt Readings from Last 3 Encounters:   07/12/21 182 lb 3.2 oz (82.6 kg)   06/08/21 185 lb (83.9 kg)   03/15/21 180 lb 12.8 oz (82 kg)       Physical Exam  Constitutional:       Appearance: Normal appearance. HENT:      Head: Normocephalic and atraumatic. Right Ear: External ear normal.      Left Ear: External ear normal.      Nose: Nose normal.      Mouth/Throat:      Pharynx: No oropharyngeal exudate. Eyes:      General: No scleral icterus. Right eye: No discharge. Left eye: No discharge. Conjunctiva/sclera: Conjunctivae normal.      Pupils: Pupils are equal, round, and reactive to light. Neck:      Thyroid: No thyromegaly. Vascular: No JVD. Trachea: No tracheal deviation. Cardiovascular:      Rate and Rhythm: Normal rate and regular rhythm. Heart sounds: Normal heart sounds. No murmur heard. No friction rub. No gallop. Pulmonary:      Effort: Pulmonary effort is normal. No respiratory distress. Breath sounds: Normal breath sounds. No stridor. No wheezing. Musculoskeletal:         General: No tenderness or deformity. Normal range of motion. Cervical back: Normal range of motion and neck supple. Lymphadenopathy:      Cervical: No cervical adenopathy. Skin:     General: Skin is warm and dry. Neurological:      Mental Status: He is alert. Cranial Nerves: No cranial nerve deficit. Coordination: Coordination normal.      Gait: Gait is intact. Deep Tendon Reflexes: Reflexes normal.         Assessment & Plan:      ICD-10-CM ICD-9-CM    1.  Type 2 diabetes mellitus with hyperglycemia, without long-term current use of insulin ([facility])  E11.65 250.00 canagliflozin (INVOKANA) 100 mg tablet     790.29 HEMOGLOBIN A1C WITH EAG      METABOLIC PANEL, COMPREHENSIVE      CBC W/O DIFF   2. Erectile dysfunction, unspecified erectile dysfunction type  N52.9 607.84 tadalafiL (Cialis) 10 mg tablet      REFERRAL TO UROLOGY   3. RUQ pain  R10.11 789.01    4. JAVIER (obstructive sleep apnea)  G47.33 327.23    5. Pain in both lower extremities  M79.604 729.5     M79.605     6. Coronary artery disease involving native coronary artery of native heart without angina pectoris  I25.10 414.01    7. Gastroesophageal reflux disease, unspecified whether esophagitis present  K21.9 530.81    8. Low mean corpuscular volume (MCV)  R71.8 790.09 PERIPHERAL SMEAR      METABOLIC PANEL, COMPREHENSIVE      ALPHA THALASSEMIA      CBC W/O DIFF      SICKLE CELL SCREEN      IRON PROFILE      FERRITIN   9. Hypercholesteremia  E78.00 272.0 atorvastatin (LIPITOR) 80 mg tablet       · DM2: Chronic, stable. Will check labs and continue current regimen  · ED: Chronic, not improved on current dosing. Will increase dose to 10mg and provide referral to urology. Discussed not using cialis and nitro within 24 hours of each other. ·  RUQ Pain: Chronic, stable. Discussed obtaining US as previously advised  · JAVIER: Chronic, managed by sleep medicine  · LE Pain: Chronic, stable. Discussed obtaining CHARLENE as previously discussed. Patient expresses understanding. · CAD: Chronic, stable. Continue current regimen   · GERD: Chronic, stable. Continue current therapy. · Low MCV: Chronic, unclear status. Obtain labs as above. · HLD: Chronic, stable. Continue lipitor. I have discussed the diagnosis with the patient and the intended plan as seen in the above orders. The patient has received an after-visit summary along with patient information handout. I have discussed medication side effects and warnings with the patient as well.     Disposition  Follow-up and Dispositions    · Return in about 3 months (around 10/12/2021) for DM2.            Dwayne Regalado MD

## 2021-09-27 ENCOUNTER — OFFICE VISIT (OUTPATIENT)
Dept: CARDIOLOGY CLINIC | Age: 61
End: 2021-09-27
Payer: MEDICAID

## 2021-09-27 VITALS
HEART RATE: 87 BPM | RESPIRATION RATE: 16 BRPM | OXYGEN SATURATION: 97 % | BODY MASS INDEX: 28.93 KG/M2 | DIASTOLIC BLOOD PRESSURE: 60 MMHG | HEIGHT: 66 IN | SYSTOLIC BLOOD PRESSURE: 100 MMHG | WEIGHT: 180 LBS

## 2021-09-27 DIAGNOSIS — E11.65 TYPE 2 DIABETES MELLITUS WITH HYPERGLYCEMIA, WITHOUT LONG-TERM CURRENT USE OF INSULIN (HCC): ICD-10-CM

## 2021-09-27 DIAGNOSIS — I10 ESSENTIAL HYPERTENSION: ICD-10-CM

## 2021-09-27 DIAGNOSIS — I25.10 CORONARY ARTERY DISEASE INVOLVING NATIVE CORONARY ARTERY OF NATIVE HEART WITHOUT ANGINA PECTORIS: Primary | ICD-10-CM

## 2021-09-27 DIAGNOSIS — E78.00 HYPERCHOLESTEREMIA: ICD-10-CM

## 2021-09-27 DIAGNOSIS — I42.2 ASYMMETRIC SEPTAL HYPERTROPHY (HCC): ICD-10-CM

## 2021-09-27 DIAGNOSIS — I50.22 SYSTOLIC CHF, CHRONIC (HCC): ICD-10-CM

## 2021-09-27 PROCEDURE — 3051F HG A1C>EQUAL 7.0%<8.0%: CPT | Performed by: SPECIALIST

## 2021-09-27 PROCEDURE — 93000 ELECTROCARDIOGRAM COMPLETE: CPT | Performed by: SPECIALIST

## 2021-09-27 PROCEDURE — 99214 OFFICE O/P EST MOD 30 MIN: CPT | Performed by: SPECIALIST

## 2021-09-27 NOTE — Clinical Note
9/27/2021    Patient: Leidy Chakraborty   YOB: 1960   Date of Visit: 9/27/2021     Anne Marie Mckeon, [address] [address]  [place]    Dear Anne Marie Mckeon MD,      Thank you for referring Mr. Leidy Chakraborty to [address] [place] [address] for evaluation. My notes for this consultation are attached. If you have questions, please do not hesitate to call me. I look forward to following your patient along with you.       Sincerely,    Isra García MD

## 2021-09-27 NOTE — PROGRESS NOTES
Dnailo Snow     1960       Anjelica Boyer MD, [facility]  Date of Visit-9/27/2021   PCP is Stone Ledesma, [address] [facility] [place]  [facility]  HPI:  Danilo Snow is a 64 y.o. male   11 month f/u of   CAD. Hx of diabetes since seen in the ER at [facility] in March 2020, HTN and CP along with tobacco use. Pt admitted to [facility] in [place] on 6/27/20 with NSTEMI and had placement of a stent. Pt's card indicates he had a Biotronic stent to the LAD 3.5 mm x 13 mm.      In July 2020 we reduced Toprol due to fatigue and recommended cardiac rehab. An echo was done because the EF was unknown. This showed asymmetric septal hypertrophy with normal LVEF of 63%. Prior EKG had demonstrated moderately deep symmetric negative T-waves. [facility] Today. .. Pt states that he is constantly fatigued, tired, and has SOB.   + mild chest pain +tachycardia  + leg pains +dizziness  +dyspnea on exertion   Denies  edema, syncope or shortness of breath at rest, has no tachycardia, palpitations or sense of arrhythmia. EKG: SR IRBBB non-specific ST-T segment/T wave findings   Assessment/Plan:     Patient Instructions   Please stop your metoprolol. We will see you back in about 6 weeks with a stress echocardiogram and office visit. Stress echo: Please arrive 15 minutes prior to your appointment. Wear comfortable clothes and shoes. Please do not eat or drink anything other than water two hours prior to test.       1. Coronary artery disease involving native coronary artery of native heart without angina pectoris  LAD stent June of 2020. Now off Plavix. If he has fatigue and SOB if he is taking BB, then he should stop that and return in 6 weeks with stress echo. - AMB POC EKG ROUTINE W/ 12 LEADS, INTER & REP    2. Asymmetric septal hypertrophy (HCC)  Asymptomatic. abnormal T waves, I am not sure this relates to RUIZ.  Will see if gradient on next echo. 3. Systolic CHF, chronic (HCC)  EF normal now. 07/30/20    ECHO ADULT COMPLETE 07/30/2020 7/30/2020    Interpretation Summary  · Septal asymmetric hypertrophy (1.9, 1.4 cm). No asymmetric gradient noted. · LV: Normal cavity size and systolic function (ejection fraction normal). Estimated left ventricular ejection fraction is 63%. Abnorml left ventricular strain. · LA: Mildly dilated left atrium. · AV: Aortic valve sclerosis with no significant stenosis. Aortic valve leaflet calcification present. Signed by: Go Noel MD on 7/30/2020  6:29 PM        4. Essential hypertension  Continue CCB and ACE. At goal , meds and possible side effects reviewed and patient denies  Key CAD CHF Meds             atorvastatin (LIPITOR) 80 mg tablet (Taking) Take 1 Tablet by mouth daily. lisinopriL (PRINIVIL, ZESTRIL) 5 mg tablet (Taking) Take 1 Tablet by mouth daily. amLODIPine (NORVASC) 5 mg tablet (Taking) Take 1 Tab by mouth daily. aspirin delayed-release 81 mg tablet (Taking) Take  by mouth daily. nitroglycerin (NITROSTAT) 0.4 mg SL tablet (Taking) 0.4 mg by SubLINGual route every five (5) minutes as needed for Chest Pain. Up to 3 doses. BP Readings from Last 6 Encounters:   09/27/21 100/60   07/12/21 112/74   06/08/21 125/85   03/15/21 118/76   03/08/21 (!) 130/90   02/02/21 128/74        5. Type 2 diabetes mellitus with hyperglycemia, without long-term current use of insulin (HCC)  On metformin. At goal , denies excess muscle aches or new liver issues  Key Antihyperlipidemia Meds             atorvastatin (LIPITOR) 80 mg tablet (Taking) Take 1 Tablet by mouth daily. Lab Results   Component Value Date/Time    LDL, calculated 64.2 03/09/2021 09:00 AM     6. Hypercholesteremia  Lipids on high potency statin as appropriate for secondary prevention. F/u in 6 weeks with stress echo     Impression:   1.  Coronary artery disease involving native coronary artery of native heart without angina pectoris    2. Asymmetric septal hypertrophy (HCC)    3. Systolic CHF, chronic ([address]    4. Essential hypertension    5. Type 2 diabetes mellitus with hyperglycemia, without long-term current use of insulin (HCC)    6. Hypercholesteremia       Cardiac History:   No specialty comments available. Future Appointments   Date Time Provider Jarett Nidia   11/11/2021  3:00 PM ECHO, GEORGETTE AGARWAL AMB   11/11/2021  3:00 PM STRESSECHOGEORGETTE AMB   11/11/2021  4:00 PM Anjelica Neal, MD MARGIE AGARWAL AMB        ROS-except as noted above. . A complete cardiac and respiratory are reviewed and negative except as above ; Resp-denies wheezing  or productive cough,. Const- No unusual weight loss or fever; Neuro-no recent seizure or CVA ; GI- No BRBPR, abdom pain, bloating ; - no  hematuria   see supplement sheet, initialed and to be scanned by staff  Past Medical History:   Diagnosis Date    Asymmetric septal hypertrophy ([facility] [address] 9/8/2020    Coronary artery disease involving native coronary artery of native heart without angina pectoris 8/4/2020    NSTEMI June 2020 in [place] with stent to LAD    Gastroesophageal reflux disease 4/9/2020    Hypertension     JAVIER (obstructive sleep apnea) 4/9/2020    Tobacco abuse 11/24/2018    Type 2 diabetes mellitus with hyperglycemia, without long-term current use of insulin ([address] 5/13/2020      Social Hx= reports that he has been smoking. He has a 5.00 pack-year smoking history. He has never used smokeless tobacco. He reports previous alcohol use of about 2.0 standard drinks of alcohol per week. He reports that he does not use drugs. Exam and Labs:  /60 (BP 1 Location: Left upper arm, BP Patient Position: Sitting, BP Cuff Size: Adult)   Pulse 87   Resp 16   Ht 5' 6\" (1.676 m)   Wt 180 lb (81.6 kg)   SpO2 97%   BMI 29.05 kg/m² Constitutional:  NAD, comfortable  Head: NC,AT. Eyes: No scleral icterus. Neck:  Neck supple.  No JVD present. Throat: moist mucous membranes. Chest: Effort normal & normal respiratory excursion . Neurological: alert, conversant and oriented . Skin: Skin is not cold. No obvious systemic rash noted. Not diaphoretic. No erythema. Psychiatric:  Grossly normal mood and affect. Behavior appears normal. Extremities:  no clubbing or cyanosis. Abdomen: non distended    Lungs:breath sounds normal. No stridor. distress, wheezes or  Rales. Heart:1/6 murmur normal rate, regular rhythm, normal S1, S2, no murmurs, rubs, clicks or gallops , PMI non displaced. Edema: Edema is none. Lab Results   Component Value Date/Time    Cholesterol, total 150 03/09/2021 09:00 AM    HDL Cholesterol 73 03/09/2021 09:00 AM    LDL, calculated 64.2 03/09/2021 09:00 AM    Triglyceride 64 03/09/2021 09:00 AM    CHOL/HDL Ratio 2.1 03/09/2021 09:00 AM     Lab Results   Component Value Date/Time    Sodium 142 03/09/2021 09:00 AM    Potassium 4.1 03/09/2021 09:00 AM    Chloride 107 03/09/2021 09:00 AM    CO2 27 03/09/2021 09:00 AM    Anion gap 8 03/09/2021 09:00 AM    Glucose 112 (H) 03/09/2021 09:00 AM    BUN 9 03/09/2021 09:00 AM    Creatinine 0.76 03/09/2021 09:00 AM    BUN/Creatinine ratio 12 03/09/2021 09:00 AM    GFR est AA >60 03/09/2021 09:00 AM    GFR est non-AA >60 03/09/2021 09:00 AM    Calcium 9.6 03/09/2021 09:00 AM      Wt Readings from Last 3 Encounters:   09/27/21 180 lb (81.6 kg)   07/12/21 182 lb 3.2 oz (82.6 kg)   06/08/21 185 lb (83.9 kg)      BP Readings from Last 3 Encounters:   09/27/21 100/60   07/12/21 112/74   06/08/21 125/85      Current Outpatient Medications   Medication Sig    tadalafiL (Cialis) 10 mg tablet Take 1 to 2 tabs at least 30 minutes prior to anticipated sexual activity as one single dose and not more than once daily.  atorvastatin (LIPITOR) 80 mg tablet Take 1 Tablet by mouth daily.  canagliflozin (INVOKANA) 100 mg tablet Take 1 Tablet by mouth Daily (before breakfast).     lisinopriL (PRINIVIL, ZESTRIL) 5 mg tablet Take 1 Tablet by mouth daily.  metFORMIN (GLUCOPHAGE) 500 mg tablet Take 1 Tab by mouth two (2) times daily (with meals).  amLODIPine (NORVASC) 5 mg tablet Take 1 Tab by mouth daily.  aspirin delayed-release 81 mg tablet Take  by mouth daily.  nitroglycerin (NITROSTAT) 0.4 mg SL tablet 0.4 mg by SubLINGual route every five (5) minutes as needed for Chest Pain. Up to 3 doses. No current facility-administered medications for this visit. Impression see above.       Written by Scott Sherman, as dictated by Cathie Null MD.

## 2021-09-27 NOTE — PATIENT INSTRUCTIONS
Please stop your metoprolol. We will see you back in about 6 weeks with a stress echocardiogram and office visit. Stress echo: Please arrive 15 minutes prior to your appointment. Wear comfortable clothes and shoes.  Please do not eat or drink anything other than water two hours prior to test.

## 2021-11-11 ENCOUNTER — OFFICE VISIT (OUTPATIENT)
Dept: CARDIOLOGY CLINIC | Age: 61
End: 2021-11-11

## 2021-11-11 ENCOUNTER — APPOINTMENT (OUTPATIENT)
Dept: CARDIOLOGY CLINIC | Age: 61
End: 2021-11-11

## 2021-11-11 ENCOUNTER — ANCILLARY PROCEDURE (OUTPATIENT)
Dept: CARDIOLOGY CLINIC | Age: 61
End: 2021-11-11

## 2021-11-11 VITALS
HEIGHT: 66 IN | DIASTOLIC BLOOD PRESSURE: 82 MMHG | SYSTOLIC BLOOD PRESSURE: 120 MMHG | WEIGHT: 180 LBS | BODY MASS INDEX: 28.93 KG/M2

## 2021-11-11 VITALS
BODY MASS INDEX: 28.77 KG/M2 | SYSTOLIC BLOOD PRESSURE: 120 MMHG | WEIGHT: 179 LBS | HEART RATE: 97 BPM | OXYGEN SATURATION: 97 % | DIASTOLIC BLOOD PRESSURE: 82 MMHG | HEIGHT: 66 IN | RESPIRATION RATE: 16 BRPM

## 2021-11-11 DIAGNOSIS — I50.22 SYSTOLIC CHF, CHRONIC (HCC): ICD-10-CM

## 2021-11-11 DIAGNOSIS — I25.10 CORONARY ARTERY DISEASE INVOLVING NATIVE CORONARY ARTERY OF NATIVE HEART WITHOUT ANGINA PECTORIS: ICD-10-CM

## 2021-11-11 DIAGNOSIS — I25.10 CORONARY ARTERY DISEASE INVOLVING NATIVE CORONARY ARTERY OF NATIVE HEART WITHOUT ANGINA PECTORIS: Primary | ICD-10-CM

## 2021-11-11 DIAGNOSIS — I10 ESSENTIAL HYPERTENSION: ICD-10-CM

## 2021-11-11 DIAGNOSIS — E78.00 HYPERCHOLESTEREMIA: ICD-10-CM

## 2021-11-11 DIAGNOSIS — E11.65 TYPE 2 DIABETES MELLITUS WITH HYPERGLYCEMIA, WITHOUT LONG-TERM CURRENT USE OF INSULIN (HCC): ICD-10-CM

## 2021-11-11 DIAGNOSIS — I42.2 ASYMMETRIC SEPTAL HYPERTROPHY (HCC): ICD-10-CM

## 2021-11-11 LAB
STRESS ANGINA INDEX: 0
STRESS BASELINE DIAS BP: 88 MMHG
STRESS BASELINE HR: 95 BPM
STRESS BASELINE SYS BP: 118 MMHG
STRESS ESTIMATED WORKLOAD: 7 METS
STRESS EXERCISE DUR MIN: NORMAL
STRESS O2 SAT PEAK: 97 %
STRESS O2 SAT REST: 97 %
STRESS PEAK DIAS BP: 90 MMHG
STRESS PEAK SYS BP: 140 MMHG
STRESS PERCENT HR ACHIEVED: 86 %
STRESS POST PEAK HR: 137 BPM
STRESS RATE PRESSURE PRODUCT: NORMAL BPM*MMHG
STRESS ST DEPRESSION: 0 MM
STRESS ST ELEVATION: 0 MM
STRESS TARGET HR: 159 BPM

## 2021-11-11 PROCEDURE — 99214 OFFICE O/P EST MOD 30 MIN: CPT | Performed by: SPECIALIST

## 2021-11-11 PROCEDURE — 93351 STRESS TTE COMPLETE: CPT | Performed by: SPECIALIST

## 2021-11-11 PROCEDURE — 3051F HG A1C>EQUAL 7.0%<8.0%: CPT | Performed by: SPECIALIST

## 2021-11-11 NOTE — PROGRESS NOTES
Clarita Sterling     1960       Anjelica Carreno MD, [facility]  Date of Visit-11/11/2021   PCP is Keyla Ochoa, [address] [facility]  [facility]  HPI:  Clarita Sterling is a 64 y.o. male   6 week f/u of   · CAD. · NSTEMI /stent   Biotronic stent to the LAD 3.5 mm x 13 mm. 6/27/2020 [facility]    · [place] 2020 we reduced Toprol due to fatigue and recommended cardiac rehab. Toprol stopped 9/27/21  · ECHO  asymmetric septal hypertrophy with normal LVEF of 63%. · Prior EKG had demonstrated moderately deep symmetric negative T-waves. Likely this is due to the [email or web address]. · diabetes since seen in the ER at [facility] in March 2020, HTN and CP along with tobacco use. Today. .. Feels well, here for stress test today  Last OV =constantly fatigued, tired, and has SOB. so Toprol stopped+ mild chest pain +tachycardia  + leg pains +dizziness  +dyspnea on exertion   Currently the shortness of breath has stayed the same but the other symptoms have improved    Denies chest pain, edema, syncope  has no tachycardia, palpitations or sense of arrhythmia. EKG: Heart rate 100 left axis deviation flat T waves in the lateral leads late anterior R wave progressions consistent with possible prior subendocardial anterior infarction  Assessment/Plan:     1. Coronary artery disease involving native coronary artery of native heart without angina pectoris  Prior LAD stent June 2020  Prior chest pain has resolved stress echo today shows a low risk scan and stopping the beta-blocker did not particularly change his shortness of breath but his fatigue did improve. 11/11/21 ECHO STRESS   Interpretation Summary  · Baseline ECG: Normal sinus rhythm, rare PVCs, Late anterior R wave progression (non specific for jesus-septal MI) . Patient walked 4 minutes and 16 seconds. Exercise tolerance is limited. · Echo: No regional left ventricular wall motion abnormality noted.  Jonathan Piña motion unchanged from baseline. · Echo: Normal stress echocardiogram. Low risk study. Results discussed with patient at office visit. 2. Asymmetric septal hypertrophy (HCC)   septum 1.9  Abnormal T waves moderate RUIZ but no gradient on prior echo    3. Systolic CHF, chronic (HCC)  EF now normal    4. Essential hypertension  Stable control continue CCB and ACE  At goal , meds and possible side effects reviewed and patient denies  Key CAD CHF Meds             lisinopriL (PRINIVIL, ZESTRIL) 5 mg tablet (Taking) Take 1 Tablet by mouth daily. amLODIPine (NORVASC) 5 mg tablet (Taking) Take 1 Tab by mouth daily. aspirin delayed-release 81 mg tablet (Taking) Take  by mouth daily. nitroglycerin (NITROSTAT) 0.4 mg SL tablet (Taking) 0.4 mg by SubLINGual route every five (5) minutes as needed for Chest Pain. Up to 3 doses. atorvastatin (LIPITOR) 80 mg tablet Take 1 Tablet by mouth daily. BP Readings from Last 6 Encounters:   11/11/21 120/82   11/11/21 120/82   09/27/21 100/60   07/12/21 112/74   06/08/21 125/85   03/15/21 118/76        5 . Hypercholesteremia  On atorvastatin and LDL is at goal  Lab Results   Component Value Date/Time    LDL, calculated 64.2 03/09/2021 09:00 AM       . Type 2 diabetes mellitus with hyperglycemia, without long-term current use of insulin (HCC)  On Metformin follow-up PCP         Impression:   1. Coronary artery disease involving native coronary artery of native heart without angina pectoris    2. Asymmetric septal hypertrophy (HCC)    3. Systolic CHF, chronic ([address]    4. Essential hypertension    5. Type 2 diabetes mellitus with hyperglycemia, without long-term current use of insulin (HCC)    6. Hypercholesteremia       Cardiac History:   No specialty comments available.    CAD LAD stent June 2020  Stress echo 11/11/2021 walk 4 minutes 16 seconds poor R wave progression normal stress echo  Echo 7/30/2020 asymmetric septal hypertrophy 1.9 to 1.4 cm no gradient noted EF 63% abnormal LV strain mild LAE patient has aortic sclerosis without stenosis murmur is from sclerosis  Future Appointments   Date Time Provider Jarett Jacob   5/12/2022  3:00 PM Anjelica Neal MD [facility]        ROS-except as noted above. . A complete cardiac and respiratory are reviewed and negative except as above ; Resp-denies wheezing  or productive cough,. Const- No unusual weight loss or fever; Neuro-no recent seizure or CVA ; GI- No BRBPR, abdom pain, bloating ; - no  hematuria   see supplement sheet, initialed and to be scanned by staff  Past Medical History:   Diagnosis Date    Asymmetric septal [facility] ([facility] 9/8/2020    Coronary artery disease involving native coronary artery of native heart without angina pectoris 8/4/2020    NSTEMI June 2020 in [place] with stent to LAD    Gastroesophageal reflux disease 4/9/2020    Hypertension     JAVIER (obstructive sleep apnea) 4/9/2020    Tobacco abuse 11/24/2018    Type 2 diabetes mellitus with hyperglycemia, without long-term current use of insulin ([facility] 5/13/2020      Social Hx= reports that he has been smoking. He has a 5.00 pack-year smoking history. He has never used smokeless tobacco. He reports previous alcohol use of about 2.0 standard drinks of alcohol per week. He reports that he does not use drugs. Exam and Labs:  /82   Pulse 97   Resp 16   Ht 5' 6\" (1.676 m)   Wt 179 lb (81.2 kg)   SpO2 97%   BMI 28.89 kg/m² Constitutional:  NAD, comfortable  Head: NC,AT. Eyes: No scleral icterus. Neck:  Neck supple. No JVD present. Throat: moist mucous membranes. Chest: Effort normal & normal respiratory excursion . Neurological: alert, conversant and oriented . Skin: Skin is not cold. No obvious systemic rash noted. Not diaphoretic. No erythema. Psychiatric:  Grossly normal mood and affect. Behavior appears normal. Extremities:  no clubbing or cyanosis. Abdomen: non distended    Lungs:breath sounds normal. No stridor.  distress, wheezes or  Rales. Heart: normal rate, regular rhythm, normal S1, S2, no , rubs, clicks or gallops , PMI non displaced. 2/6 systolic early/decresendo  murmur at RUSB to LUSB without radiation beyond apex    Edema: Edema is none. Lab Results   Component Value Date/Time    Cholesterol, total 150 03/09/2021 09:00 AM    HDL Cholesterol 73 03/09/2021 09:00 AM    LDL, calculated 64.2 03/09/2021 09:00 AM    Triglyceride 64 03/09/2021 09:00 AM    CHOL/HDL Ratio 2.1 03/09/2021 09:00 AM     Lab Results   Component Value Date/Time    Sodium 142 03/09/2021 09:00 AM    Potassium 4.1 03/09/2021 09:00 AM    Chloride 107 03/09/2021 09:00 AM    CO2 27 03/09/2021 09:00 AM    Anion gap 8 03/09/2021 09:00 AM    Glucose 112 (H) 03/09/2021 09:00 AM    BUN 9 03/09/2021 09:00 AM    Creatinine 0.76 03/09/2021 09:00 AM    BUN/Creatinine ratio 12 03/09/2021 09:00 AM    GFR est AA >60 03/09/2021 09:00 AM    GFR est non-AA >60 03/09/2021 09:00 AM    Calcium 9.6 03/09/2021 09:00 AM      Wt Readings from Last 3 Encounters:   09/27/21 180 lb (81.6 kg)   07/12/21 182 lb 3.2 oz (82.6 kg)   06/08/21 185 lb (83.9 kg)      BP Readings from Last 3 Encounters:   09/27/21 100/60   07/12/21 112/74   06/08/21 125/85      Current Outpatient Medications   Medication Sig    tadalafiL (Cialis) 10 mg tablet Take 1 to 2 tabs at least 30 minutes prior to anticipated sexual activity as one single dose and not more than once daily.  atorvastatin (LIPITOR) 80 mg tablet Take 1 Tablet by mouth daily.  canagliflozin (INVOKANA) 100 mg tablet Take 1 Tablet by mouth Daily (before breakfast).  lisinopriL (PRINIVIL, ZESTRIL) 5 mg tablet Take 1 Tablet by mouth daily.  metFORMIN (GLUCOPHAGE) 500 mg tablet Take 1 Tab by mouth two (2) times daily (with meals).  amLODIPine (NORVASC) 5 mg tablet Take 1 Tab by mouth daily.  aspirin delayed-release 81 mg tablet Take  by mouth daily.     nitroglycerin (NITROSTAT) 0.4 mg SL tablet 0.4 mg by SubLINGual route every five (5) minutes as needed for Chest Pain. Up to 3 doses. No current facility-administered medications for this visit. Impression see above.       Written by Miller Wynne, as dictated by Shani Jorge MD.

## 2021-12-14 ENCOUNTER — TELEPHONE (OUTPATIENT)
Dept: FAMILY MEDICINE CLINIC | Age: 61
End: 2021-12-14

## 2021-12-14 ENCOUNTER — LAB ONLY (OUTPATIENT)
Dept: FAMILY MEDICINE CLINIC | Age: 61
End: 2021-12-14

## 2021-12-14 DIAGNOSIS — I42.2 ASYMMETRIC SEPTAL HYPERTROPHY (HCC): ICD-10-CM

## 2021-12-14 DIAGNOSIS — I10 ESSENTIAL HYPERTENSION: Primary | ICD-10-CM

## 2021-12-14 DIAGNOSIS — E11.65 TYPE 2 DIABETES MELLITUS WITH HYPERGLYCEMIA, WITHOUT LONG-TERM CURRENT USE OF INSULIN (HCC): ICD-10-CM

## 2021-12-14 DIAGNOSIS — I10 ESSENTIAL HYPERTENSION: ICD-10-CM

## 2021-12-14 DIAGNOSIS — E78.00 HYPERCHOLESTEREMIA: ICD-10-CM

## 2021-12-15 LAB
ANION GAP SERPL CALC-SCNC: 4 MMOL/L (ref 5–15)
BUN SERPL-MCNC: 8 MG/DL (ref 6–20)
BUN/CREAT SERPL: 10 (ref 12–20)
CALCIUM SERPL-MCNC: 10 MG/DL (ref 8.5–10.1)
CHLORIDE SERPL-SCNC: 108 MMOL/L (ref 97–108)
CO2 SERPL-SCNC: 28 MMOL/L (ref 21–32)
CREAT SERPL-MCNC: 0.8 MG/DL (ref 0.7–1.3)
ERYTHROCYTE [DISTWIDTH] IN BLOOD BY AUTOMATED COUNT: 15.5 % (ref 11.5–14.5)
EST. AVERAGE GLUCOSE BLD GHB EST-MCNC: 180 MG/DL
FERRITIN SERPL-MCNC: 192 NG/ML (ref 26–388)
GLUCOSE SERPL-MCNC: 110 MG/DL (ref 65–100)
HBA1C MFR BLD: 7.9 % (ref 4–5.6)
HCT VFR BLD AUTO: 41.9 % (ref 36.6–50.3)
HGB BLD-MCNC: 14.4 G/DL (ref 12.1–17)
HGB S BLD QL: NEGATIVE
IRON SATN MFR SERPL: 26 % (ref 20–50)
IRON SERPL-MCNC: 80 UG/DL (ref 35–150)
MCH RBC QN AUTO: 26 PG (ref 26–34)
MCHC RBC AUTO-ENTMCNC: 34.4 G/DL (ref 30–36.5)
MCV RBC AUTO: 75.6 FL (ref 80–99)
NRBC # BLD: 0 K/UL (ref 0–0.01)
NRBC BLD-RTO: 0 PER 100 WBC
PERIPHERAL SMEAR,PSM: NORMAL
PLATELET # BLD AUTO: 351 K/UL (ref 150–400)
PMV BLD AUTO: 10.2 FL (ref 8.9–12.9)
POTASSIUM SERPL-SCNC: 4.2 MMOL/L (ref 3.5–5.1)
RBC # BLD AUTO: 5.54 M/UL (ref 4.1–5.7)
SODIUM SERPL-SCNC: 140 MMOL/L (ref 136–145)
TIBC SERPL-MCNC: 310 UG/DL (ref 250–450)
WBC # BLD AUTO: 8.2 K/UL (ref 4.1–11.1)

## 2021-12-18 NOTE — PROGRESS NOTES
Dear Jordan Steinberg,     Your A1c (average blood sugar) is increased from last check. The remainder of your labs are stable. We will discuss your labs and additional recommendations further at your scheduled visit.      Rosa Isela Howe MD

## 2021-12-22 LAB — HBA1 GENE MUT TESTED BLD/T: NORMAL

## 2022-01-04 DIAGNOSIS — N52.9 ERECTILE DYSFUNCTION, UNSPECIFIED ERECTILE DYSFUNCTION TYPE: ICD-10-CM

## 2022-01-04 RX ORDER — TADALAFIL 10 MG/1
TABLET ORAL
Qty: 30 TABLET | Refills: 2 | Status: SHIPPED | OUTPATIENT
Start: 2022-01-04 | End: 2022-03-18 | Stop reason: SDUPTHER

## 2022-01-04 NOTE — TELEPHONE ENCOUNTER
Last Visit: 7/12/21 MD Rashawn Hu  Next Appointment: 1/11/22 MD Rashawn Hu  Previous Refill Encounter(s): 7/12/21 30 + 2    Requested Prescriptions     Pending Prescriptions Disp Refills    tadalafiL (Cialis) 10 mg tablet 30 Tablet 2     Sig: Take 1 to 2 tabs at least 30 minutes prior to anticipated sexual activity as one single dose and not more than once daily.

## 2022-01-11 ENCOUNTER — OFFICE VISIT (OUTPATIENT)
Dept: FAMILY MEDICINE CLINIC | Age: 62
End: 2022-01-11

## 2022-01-11 VITALS
RESPIRATION RATE: 16 BRPM | SYSTOLIC BLOOD PRESSURE: 135 MMHG | HEART RATE: 75 BPM | TEMPERATURE: 97.5 F | HEIGHT: 66 IN | WEIGHT: 184 LBS | BODY MASS INDEX: 29.57 KG/M2 | DIASTOLIC BLOOD PRESSURE: 78 MMHG | OXYGEN SATURATION: 97 %

## 2022-01-11 DIAGNOSIS — R10.11 RUQ PAIN: ICD-10-CM

## 2022-01-11 DIAGNOSIS — R71.8 LOW MEAN CORPUSCULAR VOLUME (MCV): ICD-10-CM

## 2022-01-11 DIAGNOSIS — G47.33 OSA (OBSTRUCTIVE SLEEP APNEA): ICD-10-CM

## 2022-01-11 DIAGNOSIS — M79.604 PAIN IN BOTH LOWER EXTREMITIES: ICD-10-CM

## 2022-01-11 DIAGNOSIS — K21.9 GASTROESOPHAGEAL REFLUX DISEASE, UNSPECIFIED WHETHER ESOPHAGITIS PRESENT: ICD-10-CM

## 2022-01-11 DIAGNOSIS — E11.65 TYPE 2 DIABETES MELLITUS WITH HYPERGLYCEMIA, WITHOUT LONG-TERM CURRENT USE OF INSULIN (HCC): Primary | ICD-10-CM

## 2022-01-11 DIAGNOSIS — I25.10 CORONARY ARTERY DISEASE INVOLVING NATIVE CORONARY ARTERY OF NATIVE HEART WITHOUT ANGINA PECTORIS: ICD-10-CM

## 2022-01-11 DIAGNOSIS — R20.0 ARM NUMBNESS: ICD-10-CM

## 2022-01-11 DIAGNOSIS — M79.605 PAIN IN BOTH LOWER EXTREMITIES: ICD-10-CM

## 2022-01-11 DIAGNOSIS — N52.9 ERECTILE DYSFUNCTION, UNSPECIFIED ERECTILE DYSFUNCTION TYPE: ICD-10-CM

## 2022-01-11 PROCEDURE — 99214 OFFICE O/P EST MOD 30 MIN: CPT | Performed by: FAMILY MEDICINE

## 2022-01-11 RX ORDER — IBUPROFEN 200 MG
CAPSULE ORAL
Qty: 90 STRIP | Refills: 1 | Status: SHIPPED | OUTPATIENT
Start: 2022-01-11

## 2022-01-11 RX ORDER — LANCETS
EACH MISCELLANEOUS
Qty: 1 EACH | Refills: 11 | Status: SHIPPED | OUTPATIENT
Start: 2022-01-11

## 2022-01-11 RX ORDER — METOPROLOL SUCCINATE 50 MG/1
TABLET, EXTENDED RELEASE ORAL
COMMUNITY
Start: 2021-12-13 | End: 2022-08-01

## 2022-01-11 RX ORDER — INSULIN PUMP SYRINGE, 3 ML
EACH MISCELLANEOUS
Qty: 1 KIT | Refills: 0 | Status: SHIPPED | OUTPATIENT
Start: 2022-01-11

## 2022-01-11 NOTE — PROGRESS NOTES
Chief Complaint   Patient presents with    Complete Physical        1. \"Have you been to the ER, urgent care clinic since your last visit? Hospitalized since your last visit? \" No    2. \"Have you seen or consulted any other health care providers outside of the [address] [place] [address] since your last visit? \" No     3. For patients aged 39-70: Has the patient had a colonoscopy? No     If the patient is female:    4. For patients aged 41-77: Has the patient had a mammogram within the past 2 years? NA based on age or sex    11. For patients aged 21-30: Has the patient had a pap smear?  NA based on age or sex    3 most recent PHQ Screens 11/11/2021   Little interest or pleasure in doing things Not at all   Feeling down, depressed, irritable, or hopeless Not at all   Total Score PHQ 2 0

## 2022-01-11 NOTE — PROGRESS NOTES
Meri Rodriguez  64 y.o. male  1960  [address]  [id number]     [facility]       Encounter Date: 1/11/2022           Established Patient Visit Note: Muna De La Rosa MD    Reason for Appointment:  Chief Complaint   Patient presents with    Complete Physical         History of Present Illness:  History provided by patient    Meri Rodriguez is a 64 y.o. male who presents to clinic today for:     Annual Physical    · Numbness of the Arm: Started Dec, 2021;  left and right arm, but mostly left; he reports that his arm will randomly feel like it alseep. He reports that it occurs daily. He reports occasional stiff neck, but denies any other symptoms. He had stress echo with cardiology  that was reported as low risk. · DM2: he reports that he is taking all of his medicines as prescribed; last A1c was 7.9 on 12/14/21. He reports drinks a lot of soda. He is not checking BG. He would like to hold off on new meds until trying dietary changes. He declines referral to nutrition. · ED: he reports as improved at 10mg dose of Cialis; he did not schedule visit with urology  · RUQ Pain: US ordered in Feb, 2021, but he has not yet scheduled this d/t \"It goes away for a period of time\" He reports that he has not done this d/t \"not having the time\"  · JAVIER on CPAP: he reports that he is compliant with CPAP  · LE Pain: he reports that this has resolved. · CAD: denies any recent dyspnea or CP. Followed by cardiology  · GERD: he reports as well controlled  · Low MCV: labs ordered at last visit, but he has not yet had these done    HM  · CCS: referred  To GI on 2/2/21, but he has not yet scheduled apt. · He reports that he has had 2 COVID vaccines  · He reports having EYE exam last year (2021)  · He would like shingles vaccine  · He does not want flu vaccine today        Review of Systems  See HPI      Allergies: Patient has no known allergies.     Medications:     Current Outpatient Medications:     metoprolol succinate (TOPROL-XL) 50 mg XL tablet, , Disp: , Rfl:     glucose blood VI test strips (blood glucose test) strip, Use to check fasting blood sugar daily. Patient may use whichever product is covered by insurance., Disp: 90 Strip, Rfl: 1    lancets misc, Use to check fasting blood sugar daily. Patient may use whichever product is covered by insurance., Disp: 1 Each, Rfl: 11    Blood-Glucose Meter monitoring kit, Use to check fasting blood sugar daily. Patient may use whichever product is covered by insurance., Disp: 1 Kit, Rfl: 0    tadalafiL (Cialis) 10 mg tablet, Take 1 to 2 tabs at least 30 minutes prior to anticipated sexual activity as one single dose and not more than once daily. , Disp: 30 Tablet, Rfl: 2    atorvastatin (LIPITOR) 80 mg tablet, Take 1 Tablet by mouth daily. , Disp: 90 Tablet, Rfl: 3    canagliflozin (INVOKANA) 100 mg tablet, Take 1 Tablet by mouth Daily (before breakfast). , Disp: 90 Tablet, Rfl: 1    lisinopriL (PRINIVIL, ZESTRIL) 5 mg tablet, Take 1 Tablet by mouth daily. , Disp: 90 Tablet, Rfl: 1    aspirin delayed-release 81 mg tablet, Take  by mouth daily. , Disp: , Rfl:     nitroglycerin (NITROSTAT) 0.4 mg SL tablet, 0.4 mg by SubLINGual route every five (5) minutes as needed for Chest Pain. Up to 3 doses. , Disp: , Rfl:     metFORMIN (GLUCOPHAGE) 500 mg tablet, Take 1 Tab by mouth two (2) times daily (with meals). (Patient not taking: Reported on 1/11/2022), Disp: 60 Tab, Rfl: 6    amLODIPine (NORVASC) 5 mg tablet, Take 1 Tab by mouth daily.  (Patient not taking: Reported on 1/11/2022), Disp: 90 Tab, Rfl: 3    History  Patient Care Team:  Javid Nunez MD as PCP - General (Family Medicine)  Javid Nunez MD as PCP - [facility] Provider  Ton Garrett MD as Consulting Provider (Cardiology)    Past Medical History: he has a past medical history of Asymmetric septal hypertrophy ([address] (9/8/2020), Coronary artery disease involving native coronary artery of native heart without angina pectoris (8/4/2020), Gastroesophageal reflux disease (4/9/2020), Hypertension, JAVIER (obstructive sleep apnea) (4/9/2020), Tobacco abuse (11/24/2018), and Type 2 diabetes mellitus with hyperglycemia, without long-term current use of insulin ([facility] (5/13/2020). Past Surgical History: he has no past surgical history on file. Family Medical History: family history includes Diabetes in his maternal grandfather, mother, and sister; Hypertension in his maternal grandfather, mother, and sister. Social History: he reports that he has been smoking. He has a 5.00 pack-year smoking history. He has never used smokeless tobacco. He reports previous alcohol use of about 2.0 standard drinks of alcohol per week. He reports that he does not use drugs. Objective:   Visit Vitals  /78   Pulse 75   Temp 97.5 °F (36.4 °C)   Resp 16   Ht 5' 6\" (1.676 m)   Wt 184 lb (83.5 kg)   SpO2 97%   BMI 29.70 kg/m²     Wt Readings from Last 3 Encounters:   01/11/22 184 lb (83.5 kg)   11/11/21 179 lb (81.2 kg)   11/11/21 180 lb (81.6 kg)       Physical Exam  Constitutional:       Appearance: Normal appearance. HENT:      Head: Normocephalic and atraumatic. Right Ear: External ear normal.      Left Ear: External ear normal.      Nose: Nose normal.      Mouth/Throat:      Pharynx: No oropharyngeal exudate. Eyes:      General: No scleral icterus. Right eye: No discharge. Left eye: No discharge. Conjunctiva/sclera: Conjunctivae normal.      Pupils: Pupils are equal, round, and reactive to light. Neck:      Thyroid: No thyromegaly. Vascular: No JVD. Trachea: No tracheal deviation. Cardiovascular:      Rate and Rhythm: Normal rate and regular rhythm. Heart sounds: Normal heart sounds. No murmur heard. No friction rub. No gallop. Pulmonary:      Effort: Pulmonary effort is normal. No respiratory distress.       Breath sounds: Normal breath sounds. No stridor. No wheezing. Musculoskeletal:         General: No tenderness or deformity. Normal range of motion. Cervical back: Normal range of motion and neck supple. Lymphadenopathy:      Cervical: No cervical adenopathy. Skin:     General: Skin is warm and dry. Neurological:      Mental Status: He is alert. Cranial Nerves: No cranial nerve deficit. Coordination: Coordination normal.      Gait: Gait is intact. Deep Tendon Reflexes: Reflexes normal.         Assessment & Plan:      ICD-10-CM ICD-9-CM    1. Type 2 diabetes mellitus with hyperglycemia, without long-term current use of insulin ([facility])  E11.65 250.00 glucose blood VI test strips (blood glucose test) strip     790.29 lancets misc      Blood-Glucose Meter monitoring kit   2. Arm numbness  R20.0 782.0 REFERRAL TO NEUROLOGY      XR SPINE CERV 4 OR 5 V      CANCELED: XR SPINE CERV PA LAT ODONT 3 V MAX      CANCELED: XR SPINE CERV PA LAT ODONT 3 V MAX   3. Erectile dysfunction, unspecified erectile dysfunction type  N52.9 607.84    4. RUQ pain  R10.11 789.01    5. JAVIER (obstructive sleep apnea)  G47.33 327.23    6. Pain in both lower extremities  M79.604 729.5     M79.605     7. Coronary artery disease involving native coronary artery of native heart without angina pectoris  I25.10 414.01    8. Gastroesophageal reflux disease, unspecified whether esophagitis present  K21.9 530.81    9. Low mean corpuscular volume (MCV)  R71.8 790.09      · DM2: Chronic, uncontrolled. discussed medication options and associated risks/benefits/side effects/precautions; patient would like to hold off on additional medications at this time. Discussed diet and exercise recommendations  · Arm Numbness: New problem, uncontrolled. Obtain imaging as above and referral to neurology.  All other conditions listed above: chronic and stable. Will continue current treatment regimen. Will check labs as reflected above.  Discussed following up with specialist as scheduled. Discussed recommendations for diet and exercise.  Advised patient to schedule visit for colonoscopy as referred. I have discussed the diagnosis with the patient and the intended plan as seen in the above orders. The patient has received an after-visit summary along with patient information handout. I have discussed medication side effects and warnings with the patient as well. Disposition  Follow-up and Dispositions    · Return in about 3 months (around 4/11/2022).            Shira Brooks MD

## 2022-03-09 RX ORDER — LISINOPRIL 5 MG/1
5 TABLET ORAL DAILY
Qty: 90 TABLET | Refills: 1 | Status: SHIPPED | OUTPATIENT
Start: 2022-03-09 | End: 2022-09-21 | Stop reason: SDUPTHER

## 2022-03-09 NOTE — TELEPHONE ENCOUNTER
Last Visit: 1/11/22 MD Seth Garcia  Next Appointment: Not scheduled  Previous Refill Encounter(s): 7/12/21 90 + 1    Requested Prescriptions     Pending Prescriptions Disp Refills    lisinopriL (PRINIVIL, ZESTRIL) 5 mg tablet 90 Tablet 1     Sig: Take 1 Tablet by mouth daily.

## 2022-03-18 DIAGNOSIS — N52.9 ERECTILE DYSFUNCTION, UNSPECIFIED ERECTILE DYSFUNCTION TYPE: ICD-10-CM

## 2022-03-18 PROBLEM — I10 ESSENTIAL HYPERTENSION: Status: ACTIVE | Noted: 2018-11-24

## 2022-03-18 PROBLEM — E66.811 OBESITY (BMI 30.0-34.9): Status: ACTIVE | Noted: 2020-04-09

## 2022-03-18 PROBLEM — E11.65 TYPE 2 DIABETES MELLITUS WITH HYPERGLYCEMIA, WITHOUT LONG-TERM CURRENT USE OF INSULIN (HCC): Status: ACTIVE | Noted: 2020-05-13

## 2022-03-18 PROBLEM — E78.00 HYPERCHOLESTEREMIA: Status: ACTIVE | Noted: 2020-09-08

## 2022-03-18 PROBLEM — E66.9 OBESITY (BMI 30.0-34.9): Status: ACTIVE | Noted: 2020-04-09

## 2022-03-18 RX ORDER — TADALAFIL 10 MG/1
TABLET ORAL
Qty: 30 TABLET | Refills: 2 | Status: SHIPPED | OUTPATIENT
Start: 2022-03-18 | End: 2022-06-10 | Stop reason: SDUPTHER

## 2022-03-19 PROBLEM — G47.33 OSA (OBSTRUCTIVE SLEEP APNEA): Status: ACTIVE | Noted: 2020-04-09

## 2022-03-19 PROBLEM — Z72.0 TOBACCO ABUSE: Status: ACTIVE | Noted: 2018-11-24

## 2022-03-19 PROBLEM — I51.7 ASYMMETRIC SEPTAL HYPERTROPHY: Status: ACTIVE | Noted: 2020-09-08

## 2022-03-19 PROBLEM — I25.10 CORONARY ARTERY DISEASE INVOLVING NATIVE CORONARY ARTERY OF NATIVE HEART WITHOUT ANGINA PECTORIS: Status: ACTIVE | Noted: 2020-08-04

## 2022-03-19 PROBLEM — K21.9 GASTROESOPHAGEAL REFLUX DISEASE: Status: ACTIVE | Noted: 2020-04-09

## 2022-03-19 PROBLEM — I42.2 ASYMMETRIC SEPTAL HYPERTROPHY (HCC): Status: ACTIVE | Noted: 2020-09-08

## 2022-04-11 DIAGNOSIS — E11.65 TYPE 2 DIABETES MELLITUS WITH HYPERGLYCEMIA, WITHOUT LONG-TERM CURRENT USE OF INSULIN (HCC): ICD-10-CM

## 2022-04-11 NOTE — TELEPHONE ENCOUNTER
Last Visit: 1/11/22 MD Kang  Next Appointment: Not scheduled  Previous Refill Encounter(s): 7/12/21 90 + 1    Requested Prescriptions     Pending Prescriptions Disp Refills    canagliflozin (INVOKANA) 100 mg tablet 90 Tablet 1     Sig: Take 1 Tablet by mouth Daily (before breakfast).

## 2022-05-18 ENCOUNTER — OFFICE VISIT (OUTPATIENT)
Dept: CARDIOLOGY CLINIC | Age: 62
End: 2022-05-18
Payer: MEDICAID

## 2022-05-18 VITALS
BODY MASS INDEX: 28.77 KG/M2 | HEART RATE: 80 BPM | DIASTOLIC BLOOD PRESSURE: 60 MMHG | RESPIRATION RATE: 16 BRPM | SYSTOLIC BLOOD PRESSURE: 110 MMHG | WEIGHT: 179 LBS | HEIGHT: 66 IN | OXYGEN SATURATION: 98 %

## 2022-05-18 DIAGNOSIS — I42.2 ASYMMETRIC SEPTAL HYPERTROPHY (HCC): ICD-10-CM

## 2022-05-18 DIAGNOSIS — E78.00 HYPERCHOLESTEREMIA: ICD-10-CM

## 2022-05-18 DIAGNOSIS — E11.65 TYPE 2 DIABETES MELLITUS WITH HYPERGLYCEMIA, WITHOUT LONG-TERM CURRENT USE OF INSULIN (HCC): ICD-10-CM

## 2022-05-18 DIAGNOSIS — I10 ESSENTIAL HYPERTENSION: ICD-10-CM

## 2022-05-18 DIAGNOSIS — I50.22 SYSTOLIC CHF, CHRONIC (HCC): ICD-10-CM

## 2022-05-18 DIAGNOSIS — I25.10 CORONARY ARTERY DISEASE INVOLVING NATIVE CORONARY ARTERY OF NATIVE HEART WITHOUT ANGINA PECTORIS: Primary | ICD-10-CM

## 2022-05-18 PROCEDURE — 99214 OFFICE O/P EST MOD 30 MIN: CPT | Performed by: SPECIALIST

## 2022-05-18 NOTE — Clinical Note
5/18/2022    Patient: Kitty Shah   YOB: 1960   Date of Visit: 5/18/2022     Ritika Mcleod, [address] [address]  [place] [address] [place]    Dear Ritika Mcleod MD,      Thank you for referring Mr. Kitty Shah to [address] [place] [address] for evaluation. My notes for this consultation are attached. If you have questions, please do not hesitate to call me. I look forward to following your patient along with you.       Sincerely,    Caryl Diallo MD

## 2022-05-18 NOTE — PROGRESS NOTES
Maura Walker     1960       Anjelica Mandujano MD, [facility]  Date of Visit-5/18/2022   PCP is Dickson Gosselin, MD   [facility] [place]  [facility]  HPI:  Maura Walker is a 58 y.o. male   11 month f/u of   · CAD. · NSTEMI /stent   Biotronic stent to the LAD 3.5 mm x 13 mm. 6/27/2020 [facility] in [place]    · [place] 2020 we reduced Toprol due to fatigue and recommended cardiac rehab. Toprol stopped 9/27/21  · ECHO  asymmetric septal hypertrophy with normal LVEF of 63%. · Prior EKG had demonstrated moderately deep symmetric negative T-waves. Likely this is due to the JORGITO.   · diabetes since seen in the ER at [facility] in March 2020, HTN and CP along with tobacco use. · Stress echo 11/11/21, walked 4:16, normal JEANETTE      Today. .. Overall the pt states he is doing well. Pt notes he believes his glucose level has increased since his last visit. He has seen PCP to review his DM. He reports his fatigue is constant and that he is tired all the time. It did not change after stopping BB, it is not limiting him. He notes he has been tested for sleep apnea. Denies chest pain, edema, syncope or shortness of breath at rest, has no tachycardia, palpitations or sense of arrhythmia. Assessment/Plan:     Patient Instructions   We will see you back in 6 months with an echocardiogram. We will call with results and see you back for an annual follow up. 1. Coronary artery disease involving native coronary artery of native heart without angina pectoris  Prior LAD stent June 2020, stress echo November 2021 looked great, no angina. Still has fatigue that did not change with stopping BB. 2. Asymmetric septal hypertrophy (HCC)  Septum was at 1.9. Abnormal T waves, moderate RUIZ. There was no gradient on prior echo and I do not hear a  Murmur. We will check an echo in 6 months.      3. Systolic CHF, chronic ([address]  His EF gone back to normal.   11/11/21    ECHO STRESS 11/11/2021 11/24/2021    Interpretation Summary  · Baseline ECG: Normal sinus rhythm, rare PVCs, Late anterior R wave progression (non specific for jesus-septal MI) . Patient walked 4 minutes and 16 seconds. Exercise tolerance is limited. · Echo: No regional left ventricular wall motion abnormality noted. Wall motion unchanged from baseline. · Echo: Normal stress echocardiogram. Low risk study. Results discussed with patient at office visit. Signed by: Aly Snow MD on 11/11/2021  4:54 PM      4. Essential hypertension  Stable. At goal , meds and possible side effects reviewed and patient denies  Key CAD CHF Meds             lisinopriL (PRINIVIL, ZESTRIL) 5 mg tablet Take 1 Tablet by mouth daily. atorvastatin (LIPITOR) 80 mg tablet (Taking) Take 1 Tablet by mouth daily. amLODIPine (NORVASC) 5 mg tablet (Taking) Take 1 Tab by mouth daily. aspirin delayed-release 81 mg tablet (Taking) Take  by mouth daily. nitroglycerin (NITROSTAT) 0.4 mg SL tablet (Taking) 0.4 mg by SubLINGual route every five (5) minutes as needed for Chest Pain. Up to 3 doses. metoprolol succinate (TOPROL-XL) 50 mg XL tablet          BP Readings from Last 6 Encounters:   05/18/22 110/60   01/11/22 135/78   11/11/21 120/82   11/11/21 120/82   09/27/21 100/60   07/12/21 112/74             5. Hypercholesteremia  Stable. At goal , denies excess muscle aches or new liver issues  Key Antihyperlipidemia Meds             atorvastatin (LIPITOR) 80 mg tablet (Taking) Take 1 Tablet by mouth daily. Lab Results   Component Value Date/Time    LDL, calculated 64.2 03/09/2021 09:00 AM      6. Type 2 diabetes mellitus with hyperglycemia, without long-term current use of insulin ([facility])  F/u PCP      Lab Results   Component Value Date/Time    Hemoglobin A1c 7.9 (H) 12/14/2021 10:30 AM            F/u in 1 year with echo in 6 months     Impression:   1.  Coronary artery disease involving native coronary artery of native heart without angina pectoris    2. Asymmetric septal hypertrophy (HCC)    3. Systolic CHF, chronic ([address]    4. Essential hypertension    5. Hypercholesteremia    6. Type 2 diabetes mellitus with hyperglycemia, without long-term current use of insulin (HCC)       Cardiac History:   CAD LAD stent June 2020  Stress echo 11/11/2021 walk 4 minutes 16 seconds poor R wave progression normal stress echo  Echo 7/30/2020 asymmetric septal hypertrophy 1.9 to 1.4 cm no gradient noted EF 63% abnormal LV strain mild LAE patient has aortic sclerosis without stenosis murmur is from sclerosis     No future appointments. Patient Care Team:  Abhijit Hammond MD as PCP - General (Family Medicine)  Abhijit Hammond MD as PCP - [facility]  Orquidea Wick MD as Consulting Provider (Cardiovascular Disease Physician)    ROS-except as noted above. . A complete cardiac and respiratory are reviewed and negative except as above ; Resp-denies wheezing  or productive cough,. Const- No unusual weight loss or fever; Neuro-no recent seizure or CVA ; GI- No BRBPR, abdom pain, bloating ; - no  hematuria   see supplement sheet, initialed and to be scanned by staff  Past Medical History:   Diagnosis Date    Asymmetric [facility]al hypertrophy ([facility] 9/8/2020    Coronary artery disease involving native coronary artery of native heart without angina pectoris 8/4/2020    NSTEMI June 2020 in [place] with stent to LAD    Gastroesophageal reflux disease 4/9/2020    Hypertension     JAVIER (obstructive sleep apnea) 4/9/2020    Tobacco abuse 11/24/2018    Type 2 diabetes mellitus with hyperglycemia, without long-term current use of insulin ([address] 5/13/2020      Social Hx= reports that he has been smoking. He has a 5.00 pack-year smoking history. He has never used smokeless tobacco. He reports previous alcohol use of about 2.0 standard drinks of alcohol per week. He reports that he does not use drugs.      Exam and Labs:  /60 Pulse 80   Resp 16   Ht 5' 6\" (1.676 m)   Wt 179 lb (81.2 kg)   SpO2 98%   BMI 28.89 kg/m² Constitutional:  NAD, comfortable  Head: NC,AT. Eyes: No scleral icterus. Neck:  Neck supple. No JVD present. Throat: moist mucous membranes. Chest: Effort normal & normal respiratory excursion . Neurological: alert, conversant and oriented . Skin: Skin is not cold. No obvious systemic rash noted. Not diaphoretic. No erythema. Psychiatric:  Grossly normal mood and affect. Behavior appears normal. Extremities:  no clubbing or cyanosis. Abdomen: non distended    Lungs:breath sounds normal. No stridor. distress, wheezes or  Rales. Heart: normal rate, regular rhythm, normal S1, S2, no murmurs, rubs, clicks or gallops , PMI non displaced. Edema: Edema is none. Lab Results   Component Value Date/Time    Cholesterol, total 150 03/09/2021 09:00 AM    HDL Cholesterol 73 03/09/2021 09:00 AM    LDL, calculated 64.2 03/09/2021 09:00 AM    Triglyceride 64 03/09/2021 09:00 AM    CHOL/HDL Ratio 2.1 03/09/2021 09:00 AM     Lab Results   Component Value Date/Time    Sodium 140 12/14/2021 10:30 AM    Potassium 4.2 12/14/2021 10:30 AM    Chloride 108 12/14/2021 10:30 AM    CO2 28 12/14/2021 10:30 AM    Anion gap 4 (L) 12/14/2021 10:30 AM    Glucose 110 (H) 12/14/2021 10:30 AM    BUN 8 12/14/2021 10:30 AM    Creatinine 0.80 12/14/2021 10:30 AM    BUN/Creatinine ratio 10 (L) 12/14/2021 10:30 AM    GFR est AA >60 12/14/2021 10:30 AM    GFR est non-AA >60 12/14/2021 10:30 AM    Calcium 10.0 12/14/2021 10:30 AM      Wt Readings from Last 3 Encounters:   05/18/22 179 lb (81.2 kg)   01/11/22 184 lb (83.5 kg)   11/11/21 179 lb (81.2 kg)      BP Readings from Last 3 Encounters:   05/18/22 110/60   01/11/22 135/78   11/11/21 120/82      Current Outpatient Medications   Medication Sig    canagliflozin (INVOKANA) 100 mg tablet Take 1 Tablet by mouth Daily (before breakfast).     tadalafiL (Cialis) 10 mg tablet Take 1 to 2 tabs at least 30 minutes prior to anticipated sexual activity as one single dose and not more than once daily.  lisinopriL (PRINIVIL, ZESTRIL) 5 mg tablet Take 1 Tablet by mouth daily. (Patient not taking: Reported on 5/18/2022)    glucose blood VI test strips (blood glucose test) strip Use to check fasting blood sugar daily. Patient may use whichever product is covered by insurance.  lancets misc Use to check fasting blood sugar daily. Patient may use whichever product is covered by insurance.  Blood-Glucose Meter monitoring kit Use to check fasting blood sugar daily. Patient may use whichever product is covered by insurance.  atorvastatin (LIPITOR) 80 mg tablet Take 1 Tablet by mouth daily.  amLODIPine (NORVASC) 5 mg tablet Take 1 Tab by mouth daily.  aspirin delayed-release 81 mg tablet Take  by mouth daily.  nitroglycerin (NITROSTAT) 0.4 mg SL tablet 0.4 mg by SubLINGual route every five (5) minutes as needed for Chest Pain. Up to 3 doses.  metoprolol succinate (TOPROL-XL) 50 mg XL tablet  (Patient not taking: Reported on 5/18/2022)    metFORMIN (GLUCOPHAGE) 500 mg tablet Take 1 Tab by mouth two (2) times daily (with meals). (Patient not taking: Reported on 1/11/2022)     No current facility-administered medications for this visit. Impression see above.       Written by Susana Holbrook, as dictated by Delinda Epley, MD.

## 2022-05-24 ENCOUNTER — OFFICE VISIT (OUTPATIENT)
Dept: FAMILY MEDICINE CLINIC | Age: 62
End: 2022-05-24
Payer: MEDICAID

## 2022-05-24 VITALS
SYSTOLIC BLOOD PRESSURE: 100 MMHG | BODY MASS INDEX: 29.12 KG/M2 | OXYGEN SATURATION: 98 % | WEIGHT: 181.2 LBS | DIASTOLIC BLOOD PRESSURE: 60 MMHG | HEART RATE: 76 BPM | TEMPERATURE: 96.8 F | RESPIRATION RATE: 18 BRPM | HEIGHT: 66 IN

## 2022-05-24 DIAGNOSIS — B37.49 CANDIDIASIS OF GENITALIA: Primary | ICD-10-CM

## 2022-05-24 PROCEDURE — 99214 OFFICE O/P EST MOD 30 MIN: CPT | Performed by: STUDENT IN AN ORGANIZED HEALTH CARE EDUCATION/TRAINING PROGRAM

## 2022-05-24 RX ORDER — FLUCONAZOLE 150 MG/1
150 TABLET ORAL DAILY
Qty: 1 TABLET | Refills: 0 | Status: SHIPPED | OUTPATIENT
Start: 2022-05-24 | End: 2022-05-25

## 2022-05-24 NOTE — PROGRESS NOTES
Chief Complaint   Patient presents with    Other     Itching private part, started apox a week ago. 1. \"Have you been to the ER, urgent care clinic since your last visit? Hospitalized since your last visit? \" No    2. \"Have you seen or consulted any other health care providers outside of the [address] [place] [address] since your last visit? \" No     3. For patients aged 39-70: Has the patient had a colonoscopy / FIT/ Cologuard? No      If the patient is female:    4. For patients aged 41-77: Has the patient had a mammogram within the past 2 years? NA - based on age or sex      11. For patients aged 21-65: Has the patient had a pap smear?  NA - based on age or sex

## 2022-05-24 NOTE — PATIENT INSTRUCTIONS
Clotrimazole 1% topical over the counter apply to affected area twice daily. If symptoms do not improve with this please call for a followup appointment.      Keep track of what you eat in terms of sweets this week and then over the next 4-6weeks reduced what you eat in terms of sweets in half and then reduce that in half again over the next 4-6 weeks

## 2022-05-24 NOTE — PROGRESS NOTES
Mamadou Ruiz  58 y.o. male  1960  [address] [facility]  [id number]     [facility]       Chief Complaint: itching in genital area  Source: self    Subjective  Mamadou Ruiz is an 58 y.o. male who presents for genital itching. He states that he had some white spots around the penile head with persistent itching but the white has gone away. Though he is still having itching at the penile head. He takes several diabetic medications. Also drinks regular soda several times a week and he asks if there are significant carboydrates and sugars in things like \"ice cream, cake, cupcakes, brownies pie\" \"because that is what I really like\". Allergies - reviewed:   No Known Allergies      Medications - reviewed:   Current Outpatient Medications   Medication Sig    fluconazole (DIFLUCAN) 150 mg tablet Take 1 Tablet by mouth daily for 1 day. FDA advises cautious prescribing of oral fluconazole in pregnancy.  canagliflozin (INVOKANA) 100 mg tablet Take 1 Tablet by mouth Daily (before breakfast).  tadalafiL (Cialis) 10 mg tablet Take 1 to 2 tabs at least 30 minutes prior to anticipated sexual activity as one single dose and not more than once daily.  lisinopriL (PRINIVIL, ZESTRIL) 5 mg tablet Take 1 Tablet by mouth daily. (Patient not taking: Reported on 5/18/2022)    glucose blood VI test strips (blood glucose test) strip Use to check fasting blood sugar daily. Patient may use whichever product is covered by insurance.  lancets misc Use to check fasting blood sugar daily. Patient may use whichever product is covered by insurance.  Blood-Glucose Meter monitoring kit Use to check fasting blood sugar daily. Patient may use whichever product is covered by insurance.  atorvastatin (LIPITOR) 80 mg tablet Take 1 Tablet by mouth daily.  amLODIPine (NORVASC) 5 mg tablet Take 1 Tab by mouth daily.  aspirin delayed-release 81 mg tablet Take  by mouth daily.     nitroglycerin (NITROSTAT) 0.4 mg SL tablet 0.4 mg by SubLINGual route every five (5) minutes as needed for Chest Pain. Up to 3 doses.  metoprolol succinate (TOPROL-XL) 50 mg XL tablet  (Patient not taking: Reported on 5/18/2022)    metFORMIN (GLUCOPHAGE) 500 mg tablet Take 1 Tab by mouth two (2) times daily (with meals). (Patient not taking: Reported on 1/11/2022)     No current facility-administered medications for this visit. Past Medical History - reviewed:  Past Medical History:   Diagnosis Date    Asymmetric septal hypertrophy ([facility] 9/8/2020    Coronary artery disease involving native coronary artery of native heart without angina pectoris 8/4/2020    NSTEMI June 2020 in [place] with stent to LAD    Gastroesophageal reflux disease 4/9/2020    Hypertension     JAVIER (obstructive sleep apnea) 4/9/2020    Tobacco abuse 11/24/2018    Type 2 diabetes mellitus with hyperglycemia, without long-term current use of insulin [facility] 5/13/2020         Past Surgical History - reviewed:   History reviewed. No pertinent surgical history.       Social History - reviewed:  Social History     Socioeconomic History    Marital status: LEGALLY      Spouse name: Not on file    Number of children: Not on file    Years of education: Not on file    Highest education level: Not on file   Occupational History    Not on file   Tobacco Use    Smoking status: Current Every Day Smoker     Packs/day: 0.50     Years: 10.00     Pack years: 5.00    Smokeless tobacco: Never Used    Tobacco comment: 4-5 cig a day   Vaping Use    Vaping Use: Never used   Substance and Sexual Activity    Alcohol use: Not Currently     Alcohol/week: 2.0 standard drinks     Types: 2 Cans of beer per week    Drug use: No    Sexual activity: Yes     Partners: Female   Other Topics Concern    Not on file   Social History Narrative    Unemployed currently, worked as sterile processing technician     Social Determinants of Health Financial Resource Strain:     Difficulty of Paying Living Expenses: Not on file   Food Insecurity:     Worried About Running Out of Food in the Last Year: Not on file    [place] of Food in the Last Year: Not on file   Transportation Needs:     Lack of Transportation (Medical): Not on file    Lack of Transportation (Non-Medical): Not on file   Physical Activity:     Days of Exercise per Week: Not on file    Minutes of Exercise per Session: Not on file   Stress:     Feeling of Stress : Not on file   Social Connections:     Frequency of Communication with Friends and Family: Not on file    Frequency of Social Gatherings with Friends and Family: Not on file    Attends Hoahaoism Services: Not on file    Active Member of [address] [place] [address] [employer] or Organizations: Not on file    Attends Club or Organization Meetings: Not on file    Marital Status: Not on file   Intimate Partner Violence:     Fear of Current or Ex-Partner: Not on file    Emotionally Abused: Not on file    Physically Abused: Not on file    Sexually Abused: Not on file   Housing Stability:     Unable to Pay for Housing in the Last Year: Not on file    Number of [place] in the Last Year: Not on file    Unstable Housing in the Last Year: Not on file         Family History - reviewed:  Family History   Problem Relation Age of Onset    Hypertension Mother     Diabetes Mother     Hypertension Sister     Diabetes Sister     Hypertension Maternal Grandfather     Diabetes Maternal Grandfather          Immunizations - reviewed:   Immunization History   Administered Date(s) Administered    COVID-19, Pfizer Purple top, DILUTE for use, 12+ yrs, 30mcg/0.3mL dose 05/09/2021, 05/30/2021    Influenza Vaccine 10/30/2018    Tdap 02/01/2019     Review of Systems   Genitourinary: Negative for dysuria, flank pain, frequency, hematuria and urgency. Skin: Positive for itching. Negative for rash.          Physical Exam  Visit Vitals  /60 (BP 1 Location: Right upper arm, BP Patient Position: Sitting, BP Cuff Size: Adult)   Pulse 76   Temp 96.8 °F (36 °C) (Temporal)   Resp 18   Ht 5' 6\" (1.676 m)   Wt 181 lb 3.2 oz (82.2 kg)   SpO2 98%   BMI 29.25 kg/m²       Physical Exam  Exam conducted with a chaperone present. Genitourinary:     Penis: Uncircumcised. Discharge present. No erythema, tenderness, swelling or lesions. Testes: Normal.      Comments: Scant white discharge at urethral meatus. No associated rash, no purulent discharge, blood from penis. Skin c/d/i          Assessment/Plan    ICD-10-CM ICD-9-CM    1. Candidiasis of genitalia  B37.49 112.2 fluconazole (DIFLUCAN) 150 mg tablet       Kitty Shah is an 58 y.o. male with poorly controlled DM on Invokana presenting with itching, white discharge around penile head c/w with candidiasis. Encouraged saline soaks for good hygeine brian given uncircumsized and will treat empirically with diflucan and topical clotrimazole for recurrent symptoms. Reviewed appropriate diabetic diet with patient who was unsure of how to read nutritional labels and of what food are appropriate for a diabetic diet. He was counseled to take a \"sweets\" inventory this week then to cut the number of sweets in half over the next 4-6 weeks and in half again over the next 4-6 weeks. He is overdue for DM followup with Dr Nandini Phillips so appt for followup was made today. 1. Candidiasis of genitalia  - fluconazole (DIFLUCAN) 150 mg tablet; Take 1 Tablet by mouth daily for 1 day. FDA advises cautious prescribing of oral fluconazole in pregnancy. Dispense: 1 Tablet; Refill: 0      Patient informed to follow up: Follow-up and Dispositions    · Return for routine followup with Dr Nandini Phillips - due 4/11/2022. I have discussed the diagnosis with the patient and the intended plan as seen in the above orders. Patient verbalized understanding of the plan and agrees with the plan.  The patient has received an after-visit summary and questions were answered concerning future plans. I have discussed medication side effects and warnings with the patient as well. Informed patient to return to the office if new symptoms arise.       Charlette Simental MD  [facility]

## 2022-06-10 DIAGNOSIS — N52.9 ERECTILE DYSFUNCTION, UNSPECIFIED ERECTILE DYSFUNCTION TYPE: ICD-10-CM

## 2022-06-10 RX ORDER — TADALAFIL 10 MG/1
TABLET ORAL
Qty: 30 TABLET | Refills: 2 | Status: SHIPPED | OUTPATIENT
Start: 2022-06-10 | End: 2022-09-20 | Stop reason: SDUPTHER

## 2022-06-10 NOTE — TELEPHONE ENCOUNTER
Last Visit: 5/24/22 MD Fredi Libman  Next Appointment: 6/13/22 MD Luz Arevalo  Previous Refill Encounter(s): 3/18/22 30 + 2 (last refill 5/7/22 per fax)    Requested Prescriptions     Pending Prescriptions Disp Refills    tadalafiL (Cialis) 10 mg tablet 30 Tablet 2     Sig: Take 1 to 2 tabs at least 30 minutes prior to anticipated sexual activity as one single dose and not more than once daily.        For Pharmacy Admin Tracking Only     Intervention Detail: New Rx: 1, reason: Patient Preference   Time Spent (min): 1

## 2022-07-18 ENCOUNTER — OFFICE VISIT (OUTPATIENT)
Dept: FAMILY MEDICINE CLINIC | Age: 62
End: 2022-07-18

## 2022-07-18 VITALS
OXYGEN SATURATION: 98 % | TEMPERATURE: 98.2 F | WEIGHT: 178.8 LBS | HEIGHT: 66 IN | DIASTOLIC BLOOD PRESSURE: 76 MMHG | HEART RATE: 75 BPM | RESPIRATION RATE: 16 BRPM | BODY MASS INDEX: 28.73 KG/M2 | SYSTOLIC BLOOD PRESSURE: 110 MMHG

## 2022-07-18 DIAGNOSIS — Z12.11 COLON CANCER SCREENING: ICD-10-CM

## 2022-07-18 DIAGNOSIS — M25.512 CHRONIC LEFT SHOULDER PAIN: ICD-10-CM

## 2022-07-18 DIAGNOSIS — R20.0 ARM NUMBNESS: ICD-10-CM

## 2022-07-18 DIAGNOSIS — Z23 NEED FOR SHINGLES VACCINE: ICD-10-CM

## 2022-07-18 DIAGNOSIS — N52.9 ERECTILE DYSFUNCTION, UNSPECIFIED ERECTILE DYSFUNCTION TYPE: ICD-10-CM

## 2022-07-18 DIAGNOSIS — B37.49 CANDIDIASIS OF GENITALIA: ICD-10-CM

## 2022-07-18 DIAGNOSIS — E11.65 TYPE 2 DIABETES MELLITUS WITH HYPERGLYCEMIA, WITHOUT LONG-TERM CURRENT USE OF INSULIN (HCC): ICD-10-CM

## 2022-07-18 DIAGNOSIS — G47.33 OSA (OBSTRUCTIVE SLEEP APNEA): ICD-10-CM

## 2022-07-18 DIAGNOSIS — R10.9 RIGHT SIDED ABDOMINAL PAIN: Primary | ICD-10-CM

## 2022-07-18 DIAGNOSIS — I10 ESSENTIAL HYPERTENSION: ICD-10-CM

## 2022-07-18 DIAGNOSIS — L98.9 SKIN LESION: ICD-10-CM

## 2022-07-18 DIAGNOSIS — K21.9 GASTROESOPHAGEAL REFLUX DISEASE, UNSPECIFIED WHETHER ESOPHAGITIS PRESENT: ICD-10-CM

## 2022-07-18 DIAGNOSIS — G89.29 CHRONIC LEFT SHOULDER PAIN: ICD-10-CM

## 2022-07-18 DIAGNOSIS — E78.00 HYPERCHOLESTEREMIA: ICD-10-CM

## 2022-07-18 DIAGNOSIS — I25.10 CORONARY ARTERY DISEASE INVOLVING NATIVE CORONARY ARTERY OF NATIVE HEART WITHOUT ANGINA PECTORIS: ICD-10-CM

## 2022-07-18 PROCEDURE — 3044F HG A1C LEVEL LT 7.0%: CPT | Performed by: FAMILY MEDICINE

## 2022-07-18 PROCEDURE — 99214 OFFICE O/P EST MOD 30 MIN: CPT | Performed by: FAMILY MEDICINE

## 2022-07-18 RX ORDER — PRENATAL VIT 91/IRON/FOLIC/DHA 28-975-200
COMBINATION PACKAGE (EA) ORAL 2 TIMES DAILY
Qty: 30 G | Refills: 0 | Status: SHIPPED | OUTPATIENT
Start: 2022-07-18

## 2022-07-18 NOTE — PROGRESS NOTES
Tian Frederick  58 y.o. male  1960  [address] [facility]  [id number]     [address] [facility]       Encounter Date: 7/18/2022           Established Patient Visit Note: Elizabet Olvera MD    Reason for Appointment:  Chief Complaint   Patient presents with    Diabetes    Hypertension    Cholesterol Problem         History of Present Illness:  History provided by patient    Tian Frederick is a 58 y.o. male who presents to clinic today for:       Candidiasis of Groin Area: he reorts that it mostly improved with the diflucan, but he feels that it is not completely resolved  Right Sided Abdominal Pain: duration 1 month. He reports that when he sits for a while his RUQ will hurt and start aching. It is not affected by eating. He denies any n/v or problems with BM. Skin Lesion: left upper arm, he reports that he picked it away and it grew back. Left Arm Pain: he reports that if he tries to lift his left arm above his shoulder he will have pain. He denies any injuries, but he does endorse doing some strenuous work. Numbness of the Arm: he reports that this has resolved. .   DM2: he reports that he stopped taking metformin d/t fatigue. He is taking Invokana. He switched to zero sugar soda. He is not checking BG. ED: he reports as improved at 10mg dose of Cialis  JAVIER on CPAP: he reports that he is compliant with CPAP  CAD: denies any recent dyspnea or CP. Followed by cardiology  GERD: he reports as well controlled     HM  CCS: he ahs not yet scheduled apt with GI. He reports that he has had 2 COVID vaccines  He reports having EYE exam last year (2021)      Review of Systems  All other ROS were reviewed and are negative except as discussed in HPI        Allergies: Patient has no known allergies. Medications:     Current Outpatient Medications:     linaGLIPtin (TRADJENTA) 5 mg tablet, Take 1 Tablet by mouth daily. , Disp: 90 Tablet, Rfl: 1    terbinafine HCL (LAMISIL) 1 % topical cream, Apply  to affected area two (2) times a day., Disp: 30 g, Rfl: 0    tadalafiL (Cialis) 10 mg tablet, Take 1 to 2 tabs at least 30 minutes prior to anticipated sexual activity as one single dose and not more than once daily. , Disp: 30 Tablet, Rfl: 2    canagliflozin (INVOKANA) 100 mg tablet, Take 1 Tablet by mouth Daily (before breakfast). , Disp: 90 Tablet, Rfl: 1    lisinopriL (PRINIVIL, ZESTRIL) 5 mg tablet, Take 1 Tablet by mouth daily. , Disp: 90 Tablet, Rfl: 1    metoprolol succinate (TOPROL-XL) 50 mg XL tablet, , Disp: , Rfl:     glucose blood VI test strips (blood glucose test) strip, Use to check fasting blood sugar daily. Patient may use whichever product is covered by insurance., Disp: 90 Strip, Rfl: 1    lancets misc, Use to check fasting blood sugar daily. Patient may use whichever product is covered by insurance., Disp: 1 Each, Rfl: 11    Blood-Glucose Meter monitoring kit, Use to check fasting blood sugar daily. Patient may use whichever product is covered by insurance., Disp: 1 Kit, Rfl: 0    atorvastatin (LIPITOR) 80 mg tablet, Take 1 Tablet by mouth daily. , Disp: 90 Tablet, Rfl: 3    amLODIPine (NORVASC) 5 mg tablet, Take 1 Tab by mouth daily. , Disp: 90 Tab, Rfl: 3    aspirin delayed-release 81 mg tablet, Take  by mouth daily. , Disp: , Rfl:     nitroglycerin (NITROSTAT) 0.4 mg SL tablet, 0.4 mg by SubLINGual route every five (5) minutes as needed for Chest Pain. Up to 3 doses. , Disp: , Rfl:     History  Patient Care Team:  Alley Aldana MD as PCP - General (Family Medicine)  Alley Aldana MD as PCP - [facility]  Digna Currie MD as Consulting Provider (Cardiovascular Disease Physician)    Past Medical History: he has a past medical history of Asymmetric septal hypertrophy ([facility] [address] (9/8/2020), Coronary artery disease involving native coronary artery of native heart without angina pectoris (8/4/2020), Gastroesophageal reflux disease (4/9/2020), Hypertension, JAVIER (obstructive sleep apnea) (4/9/2020), Tobacco abuse (11/24/2018), and Type 2 diabetes mellitus with hyperglycemia, without long-term current use of insulin ([facility] [address] (5/13/2020). Past Surgical History: he has no past surgical history on file. Family Medical History: family history includes Diabetes in his maternal grandfather, mother, and sister; Hypertension in his maternal grandfather, mother, and sister. Social History: he reports that he has been smoking. He has a 5.00 pack-year smoking history. He has never used smokeless tobacco. He reports that he does not currently use alcohol after a past usage of about 2.0 standard drinks per week. He reports that he does not use drugs. Objective:   Visit Vitals  /76 (BP 1 Location: Right arm, BP Patient Position: Sitting, BP Cuff Size: Adult)   Pulse 75   Temp 98.2 °F (36.8 °C) (Temporal)   Resp 16   Ht 5' 6\" (1.676 m)   Wt 178 lb 12.8 oz (81.1 kg)   SpO2 98%   BMI 28.86 kg/m²     Wt Readings from Last 3 Encounters:   07/18/22 178 lb 12.8 oz (81.1 kg)   05/24/22 181 lb 3.2 oz (82.2 kg)   05/18/22 179 lb (81.2 kg)       Physical Exam  Constitutional:       Appearance: Normal appearance. HENT:      Head: Normocephalic and atraumatic. Right Ear: External ear normal.      Left Ear: External ear normal.      Nose: Nose normal.      Mouth/Throat:      Pharynx: No oropharyngeal exudate. Eyes:      General: No scleral icterus. Right eye: No discharge. Left eye: No discharge. Conjunctiva/sclera: Conjunctivae normal.      Pupils: Pupils are equal, round, and reactive to light. Neck:      Thyroid: No thyromegaly. Vascular: No JVD. Trachea: No tracheal deviation. Cardiovascular:      Rate and Rhythm: Normal rate and regular rhythm. Heart sounds: Normal heart sounds. No murmur heard. No friction rub. No gallop. Pulmonary:      Effort: Pulmonary effort is normal. No respiratory distress.       Breath sounds: Normal breath sounds. No stridor. No wheezing. Musculoskeletal:         General: No tenderness or deformity. Normal range of motion. Cervical back: Normal range of motion and neck supple. Lymphadenopathy:      Cervical: No cervical adenopathy. Skin:     General: Skin is warm and dry. Neurological:      Mental Status: He is alert. Cranial Nerves: No cranial nerve deficit. Coordination: Coordination normal.      Gait: Gait is intact. Deep Tendon Reflexes: Reflexes normal.       Assessment & Plan:      ICD-10-CM ICD-9-CM    1. Right sided abdominal pain  R10.9 789.09 US ABD COMP      2. Skin lesion  L98.9 709.9 REFERRAL TO DERMATOLOGY      3. Candidiasis of genitalia  B37.49 112.2 terbinafine HCL (LAMISIL) 1 % topical cream      4. Chronic left shoulder pain  M25.512 719.41 XR SHOULDER LT AP/LAT MIN 2 V    G89.29 338.29 REFERRAL TO PHYSICAL THERAPY      5. Type 2 diabetes mellitus with hyperglycemia, without long-term current use of insulin (HCC)  E11.65 250.00 linaGLIPtin (TRADJENTA) 5 mg tablet     790.29 CBC W/O DIFF      HEMOGLOBIN A1C WITH EAG      TSH 3RD GENERATION      TSH 3RD GENERATION      HEMOGLOBIN A1C WITH EAG      CBC W/O DIFF      6. Hypercholesteremia  E78.00 272.0 LIPID PANEL      METABOLIC PANEL, COMPREHENSIVE      METABOLIC PANEL, COMPREHENSIVE      LIPID PANEL      7. Colon cancer screening  Z12.11 V76.51 REFERRAL TO GASTROENTEROLOGY      8. Need for shingles vaccine  Z23 V04.89 varicella-zoster recombinant, PF, (SHINGRIX) 50 mcg/0.5 mL susr injection      9. JAVIER (obstructive sleep apnea)  G47.33 327.23       10. Gastroesophageal reflux disease, unspecified whether esophagitis present  K21.9 530.81       11. Coronary artery disease involving native coronary artery of native heart without angina pectoris  I25.10 414.01       12. Essential hypertension  I10 401.9       13.  Arm numbness  R20.0 782.0       14. Erectile dysfunction, unspecified erectile dysfunction type  N52.9 607.84         Candidiasis of Groin Area: chronic, uncontrolled. Treat as above. Right Sided Abdominal Pain: New problem, uncontrolled. Evaluate further as above. Discussed red flag symptoms and reasons to call or go to ED   Skin Lesion: Chronic, uncontrolled. Referral to dermatology for further evaluation. Left Arm Pain: Chronic, uncontrolled. Evaluate further as above and referral to physical therapy. Discussed red flag symptoms and reasons to call or go to ED  DM2: Chronic, controlled but not yet at goal. discussed medication options and associated risks/benefits/side effects/precautions; patient would like to try Tradjenta. Will start. All other conditions listed above: Chronic, stable and/or managed by specialist. Will continue current treatment regimen. Will check labs as reflected above. Discussed following up with specialist as scheduled. Discussed recommendations for diet and exercise. I have discussed the diagnosis with the patient and the intended plan as seen in the above orders. The patient has received an after-visit summary along with patient information handout. I have discussed medication side effects and warnings with the patient as well. Disposition  Follow-up and Dispositions    Return in about 3 months (around 10/18/2022).            Chayo Padilla MD

## 2022-07-18 NOTE — PROGRESS NOTES
Chief Complaint   Patient presents with    Diabetes    Hypertension    Cholesterol Problem         1. \"Have you been to the ER, urgent care clinic since your last visit? Hospitalized since your last visit? \" No    2. \"Have you seen or consulted any other health care providers outside of the [address] [place] [address] since your last visit? \" No     3. For patients aged 39-70: Has the patient had a colonoscopy / FIT/ Cologuard? No      If the patient is female:    4. For patients aged 41-77: Has the patient had a mammogram within the past 2 years? NA - based on age or sex      11. For patients aged 21-65: Has the patient had a pap smear?  NA - based on age or sex         1 most recent PHQ Screens 7/18/2022   Little interest or pleasure in doing things Not at all   Feeling down, depressed, irritable, or hopeless Not at all   Total Score PHQ 2 0   Trouble falling or staying asleep, or sleeping too much Not at all   Feeling tired or having little energy Not at all   Poor appetite, weight loss, or overeating Not at all   Feeling bad about yourself - or that you are a failure or have let yourself or your family down Not at all   Trouble concentrating on things such as school, work, reading, or watching TV Not at all   Moving or speaking so slowly that other people could have noticed; or the opposite being so fidgety that others notice Not at all   Thoughts of being better off dead, or hurting yourself in some way Not at all   PHQ 9 Score 0   How difficult have these problems made it for you to do your work, take care of your home and get along with others Not difficult at all       Health Maintenance Due   Topic Date Due    Pneumococcal 0-64 years (1 - PCV) Never done    Foot Exam Q1  Never done    Eye Exam Retinal or Dilated  Never done    Colorectal Cancer Screening Combo  Never done    Shingrix Vaccine Age 50> (1 of 2) Never done    COVID-19 Vaccine (3 - Booster for Pfizer series) 10/30/2021    MICROALBUMIN Q1 03/09/2022    Lipid Screen  03/09/2022

## 2022-07-19 LAB
ALBUMIN SERPL-MCNC: 4 G/DL (ref 3.5–5)
ALBUMIN/GLOB SERPL: 1.1 {RATIO} (ref 1.1–2.2)
ALP SERPL-CCNC: 100 U/L (ref 45–117)
ALT SERPL-CCNC: 17 U/L (ref 12–78)
ANION GAP SERPL CALC-SCNC: 5 MMOL/L (ref 5–15)
AST SERPL-CCNC: 14 U/L (ref 15–37)
BILIRUB SERPL-MCNC: 0.4 MG/DL (ref 0.2–1)
BUN SERPL-MCNC: 15 MG/DL (ref 6–20)
BUN/CREAT SERPL: 15 (ref 12–20)
CALCIUM SERPL-MCNC: 10 MG/DL (ref 8.5–10.1)
CHLORIDE SERPL-SCNC: 108 MMOL/L (ref 97–108)
CHOLEST SERPL-MCNC: 210 MG/DL
CO2 SERPL-SCNC: 27 MMOL/L (ref 21–32)
CREAT SERPL-MCNC: 1.01 MG/DL (ref 0.7–1.3)
ERYTHROCYTE [DISTWIDTH] IN BLOOD BY AUTOMATED COUNT: 16.6 % (ref 11.5–14.5)
EST. AVERAGE GLUCOSE BLD GHB EST-MCNC: 148 MG/DL
GLOBULIN SER CALC-MCNC: 3.6 G/DL (ref 2–4)
GLUCOSE SERPL-MCNC: 101 MG/DL (ref 65–100)
HBA1C MFR BLD: 6.8 % (ref 4–5.6)
HCT VFR BLD AUTO: 43.5 % (ref 36.6–50.3)
HDLC SERPL-MCNC: 65 MG/DL
HDLC SERPL: 3.2 {RATIO} (ref 0–5)
HGB BLD-MCNC: 14.9 G/DL (ref 12.1–17)
LDLC SERPL CALC-MCNC: 107.8 MG/DL (ref 0–100)
MCH RBC QN AUTO: 26.2 PG (ref 26–34)
MCHC RBC AUTO-ENTMCNC: 34.3 G/DL (ref 30–36.5)
MCV RBC AUTO: 76.4 FL (ref 80–99)
NRBC # BLD: 0 K/UL (ref 0–0.01)
NRBC BLD-RTO: 0 PER 100 WBC
PLATELET # BLD AUTO: 341 K/UL (ref 150–400)
PMV BLD AUTO: 9.7 FL (ref 8.9–12.9)
POTASSIUM SERPL-SCNC: 3.9 MMOL/L (ref 3.5–5.1)
PROT SERPL-MCNC: 7.6 G/DL (ref 6.4–8.2)
RBC # BLD AUTO: 5.69 M/UL (ref 4.1–5.7)
SODIUM SERPL-SCNC: 140 MMOL/L (ref 136–145)
TRIGL SERPL-MCNC: 186 MG/DL (ref ?–150)
TSH SERPL DL<=0.05 MIU/L-ACNC: 0.53 UIU/ML (ref 0.36–3.74)
VLDLC SERPL CALC-MCNC: 37.2 MG/DL
WBC # BLD AUTO: 7.7 K/UL (ref 4.1–11.1)

## 2022-07-20 NOTE — PROGRESS NOTES
Please ensure that patient receives this message. Dear Chayo Chan,    Your A1c (average blood sugar) is improved from last check. However, your cholesterol is elevated from last check. Are you taking the atorvastatin every day? If so, we may need to add an additional cholesterol medication. If not, please ensure that you take it every day as it significantly reduces your risk for heart attack and stroke. The remainder of your labs are stable.        Rosio Boggs MD

## 2022-07-27 ENCOUNTER — HOSPITAL ENCOUNTER (OUTPATIENT)
Dept: ULTRASOUND IMAGING | Age: 62
Discharge: HOME | End: 2022-07-27
Attending: FAMILY MEDICINE
Payer: MEDICAID

## 2022-07-27 DIAGNOSIS — R10.9 RIGHT SIDED ABDOMINAL PAIN: ICD-10-CM

## 2022-07-27 DIAGNOSIS — E78.00 HYPERCHOLESTEREMIA: ICD-10-CM

## 2022-07-27 PROCEDURE — 76700 US EXAM ABDOM COMPLETE: CPT

## 2022-07-27 RX ORDER — ATORVASTATIN CALCIUM 80 MG/1
80 TABLET, FILM COATED ORAL DAILY
Qty: 90 TABLET | Refills: 3 | Status: SHIPPED | OUTPATIENT
Start: 2022-07-27

## 2022-07-27 NOTE — TELEPHONE ENCOUNTER
Last Visit: 7/18/22 MD Anahi Rodriguez, labs completed  Next Appointment: None  Previous Refill Encounter(s): 7/12/21 90 + 3 (states last fill date 7/12/21)    Requested Prescriptions     Pending Prescriptions Disp Refills    atorvastatin (LIPITOR) 80 mg tablet 90 Tablet 3     Sig: Take 1 Tablet by mouth in the morning. For [address] [facility] in place:   Recommendation Provided To:    Intervention Detail: New Rx: 1, reason: Patient Preference  Gap Closed?:   Intervention Accepted By:   Time Spent (min): 5

## 2022-07-29 NOTE — PROGRESS NOTES
Dear Clarita Sterling,    No urgent concerns were identified on your imaging. It did show signs that are likely consistent with fatty liver. The treatment for this is diet and exercise. If you have any questions, please feel free to call our office at [phone].      Lois Carlton MD

## 2022-07-30 DIAGNOSIS — I50.22 SYSTOLIC CHF, CHRONIC (HCC): ICD-10-CM

## 2022-07-30 DIAGNOSIS — I25.10 CORONARY ARTERY DISEASE INVOLVING NATIVE CORONARY ARTERY OF NATIVE HEART WITHOUT ANGINA PECTORIS: Primary | ICD-10-CM

## 2022-08-01 RX ORDER — METOPROLOL SUCCINATE 50 MG/1
50 TABLET, EXTENDED RELEASE ORAL DAILY
Qty: 90 TABLET | Refills: 3 | Status: SHIPPED | OUTPATIENT
Start: 2022-08-01

## 2022-08-01 NOTE — TELEPHONE ENCOUNTER
Per VO by MD.    Future Appointments   Date Time Provider Jarett Nidia   8/8/2022  1:00 PM Dianelys Meyers., PT [facility]   11/16/2022  9:00 AM VASCULAR, GEORGETTE AGARWAL AMB   5/15/2023  9:00 AM Anjelica Neal MD [facility] AMB

## 2022-09-20 DIAGNOSIS — N52.9 ERECTILE DYSFUNCTION, UNSPECIFIED ERECTILE DYSFUNCTION TYPE: ICD-10-CM

## 2022-09-20 RX ORDER — TADALAFIL 10 MG/1
TABLET ORAL
Qty: 30 TABLET | Refills: 2 | Status: SHIPPED | OUTPATIENT
Start: 2022-09-20

## 2022-09-20 NOTE — TELEPHONE ENCOUNTER
Last Visit: 7/18/22 MD Shannon Owusu  Next Appointment: NOne  Previous Refill Encounter(s): 6/10/22 30 + 2    Requested Prescriptions     Pending Prescriptions Disp Refills    tadalafiL (Cialis) 10 mg tablet 30 Tablet 2     Sig: Take 1 to 2 tabs at least 30 minutes prior to anticipated sexual activity as one single dose and not more than once daily. For [address] [facility] in place:   Recommendation Provided To:    Intervention Detail: New Rx: 1, reason: Patient Preference  Gap Closed?:   Intervention Accepted By:   Time Spent (min): 5

## 2022-09-21 RX ORDER — LISINOPRIL 5 MG/1
5 TABLET ORAL DAILY
Qty: 90 TABLET | Refills: 1 | Status: SHIPPED | OUTPATIENT
Start: 2022-09-21

## 2022-09-21 NOTE — TELEPHONE ENCOUNTER
Last Visit: 7/18/22 MD Kang  Next Appointment: None  Previous Refill Encounter(s): 3/9/22 90 + 1    Requested Prescriptions     Pending Prescriptions Disp Refills    lisinopriL (PRINIVIL, ZESTRIL) 5 mg tablet 90 Tablet 1     Sig: Take 1 Tablet by mouth daily. For Anirudh Oconnell in place:   Recommendation Provided To:    Intervention Detail: New Rx: 1, reason: Patient Preference  Gap Closed?:   Intervention Accepted By:   Time Spent (min): 5

## 2022-11-16 ENCOUNTER — ANCILLARY PROCEDURE (OUTPATIENT)
Dept: CARDIOLOGY CLINIC | Age: 62
End: 2022-11-16
Payer: MEDICAID

## 2022-11-16 VITALS — WEIGHT: 178 LBS | HEIGHT: 66 IN | BODY MASS INDEX: 28.61 KG/M2

## 2022-11-16 DIAGNOSIS — I50.22 SYSTOLIC CHF, CHRONIC (HCC): ICD-10-CM

## 2022-11-16 DIAGNOSIS — I42.2 ASYMMETRIC SEPTAL HYPERTROPHY (HCC): ICD-10-CM

## 2022-11-16 DIAGNOSIS — I25.10 CORONARY ARTERY DISEASE INVOLVING NATIVE CORONARY ARTERY OF NATIVE HEART WITHOUT ANGINA PECTORIS: ICD-10-CM

## 2022-11-16 PROCEDURE — 93356 MYOCRD STRAIN IMG SPCKL TRCK: CPT | Performed by: SPECIALIST

## 2022-11-16 PROCEDURE — 93306 TTE W/DOPPLER COMPLETE: CPT | Performed by: SPECIALIST

## 2022-11-23 LAB
ECHO AO ASC DIAM: 2.9 CM
ECHO AO ASCENDING AORTA INDEX: 1.53 CM/M2
ECHO AO ROOT DIAM: 3.2 CM
ECHO AO ROOT INDEX: 1.68 CM/M2
ECHO AV MEAN GRADIENT: 12 MMHG
ECHO AV MEAN VELOCITY: 1.6 M/S
ECHO AV PEAK GRADIENT: 25 MMHG
ECHO AV PEAK VELOCITY: 2.5 M/S
ECHO AV VTI: 43.8 CM
ECHO LA DIAMETER INDEX: 2.11 CM/M2
ECHO LA DIAMETER: 4 CM
ECHO LA TO AORTIC ROOT RATIO: 1.25
ECHO LA VOL 2C: 48 ML (ref 18–58)
ECHO LA VOL 4C: 48 ML (ref 18–58)
ECHO LA VOL BP: 48 ML (ref 18–58)
ECHO LA VOL/BSA BIPLANE: 25 ML/M2 (ref 16–34)
ECHO LA VOLUME AREA LENGTH: 51 ML
ECHO LA VOLUME INDEX A2C: 25 ML/M2 (ref 16–34)
ECHO LA VOLUME INDEX A4C: 25 ML/M2 (ref 16–34)
ECHO LA VOLUME INDEX AREA LENGTH: 27 ML/M2 (ref 16–34)
ECHO LV E' LATERAL VELOCITY: 7 CM/S
ECHO LV E' SEPTAL VELOCITY: 6 CM/S
ECHO LV EDV A2C: 111 ML
ECHO LV EDV A4C: 92 ML
ECHO LV EDV BP: 102 ML (ref 67–155)
ECHO LV EDV INDEX A4C: 48 ML/M2
ECHO LV EDV INDEX BP: 54 ML/M2
ECHO LV EDV NDEX A2C: 58 ML/M2
ECHO LV EJECTION FRACTION A2C: 64 %
ECHO LV EJECTION FRACTION A4C: 63 %
ECHO LV EJECTION FRACTION BIPLANE: 63 % (ref 55–100)
ECHO LV ESV A2C: 40 ML
ECHO LV ESV A4C: 34 ML
ECHO LV ESV BP: 37 ML (ref 22–58)
ECHO LV ESV INDEX A2C: 21 ML/M2
ECHO LV ESV INDEX A4C: 18 ML/M2
ECHO LV ESV INDEX BP: 19 ML/M2
ECHO LV FRACTIONAL SHORTENING: 35 % (ref 28–44)
ECHO LV GLOBAL LONGITUDINAL STRAIN (GLS): -17.2 %
ECHO LV INTERNAL DIMENSION DIASTOLE INDEX: 2.58 CM/M2
ECHO LV INTERNAL DIMENSION DIASTOLIC: 4.9 CM (ref 4.2–5.9)
ECHO LV INTERNAL DIMENSION SYSTOLIC INDEX: 1.68 CM/M2
ECHO LV INTERNAL DIMENSION SYSTOLIC: 3.2 CM
ECHO LV IVSD: 1.6 CM (ref 0.6–1)
ECHO LV MASS 2D: 312.9 G (ref 88–224)
ECHO LV MASS INDEX 2D: 164.7 G/M2 (ref 49–115)
ECHO LV POSTERIOR WALL DIASTOLIC: 1.4 CM (ref 0.6–1)
ECHO LV RELATIVE WALL THICKNESS RATIO: 0.57
ECHO LVOT AREA: 3.5 CM2
ECHO LVOT DIAM: 2.1 CM
ECHO MV A VELOCITY: 0.88 M/S
ECHO MV AREA PHT: 2.9 CM2
ECHO MV E DECELERATION TIME (DT): 260.1 MS
ECHO MV E VELOCITY: 0.74 M/S
ECHO MV E/A RATIO: 0.84
ECHO MV E/E' LATERAL: 10.57
ECHO MV E/E' RATIO (AVERAGED): 11.45
ECHO MV E/E' SEPTAL: 12.33
ECHO MV PRESSURE HALF TIME (PHT): 75.4 MS
ECHO RV INTERNAL DIMENSION: 3.3 CM
ECHO RV TAPSE: 2.1 CM (ref 1.7–?)
ECHO TV REGURGITANT MAX VELOCITY: 1.93 M/S
ECHO TV REGURGITANT PEAK GRADIENT: 15 MMHG

## 2022-11-28 DIAGNOSIS — N52.9 ERECTILE DYSFUNCTION, UNSPECIFIED ERECTILE DYSFUNCTION TYPE: ICD-10-CM

## 2022-11-28 RX ORDER — TADALAFIL 10 MG/1
TABLET ORAL
Qty: 30 TABLET | Refills: 2 | Status: SHIPPED | OUTPATIENT
Start: 2022-11-28

## 2022-11-28 NOTE — TELEPHONE ENCOUNTER
Last Visit: 7/18/22 MD Stephanie Neville  Next Appointment: None  Previous Refill Encounter(s): 9/20/22 30 + 2    Requested Prescriptions     Pending Prescriptions Disp Refills    tadalafiL (Cialis) 10 mg tablet 30 Tablet 2     Sig: Take 1 to 2 tabs at least 30 minutes prior to anticipated sexual activity as one single dose and not more than once daily. For [address] [facility] in place:   Recommendation Provided To:    Intervention Detail: New Rx: 1, reason: Patient Preference  Gap Closed?:   Intervention Accepted By:   Time Spent (min): 5

## 2022-12-28 DIAGNOSIS — B37.49 CANDIDIASIS OF GENITALIA: ICD-10-CM

## 2022-12-28 RX ORDER — PRENATAL VIT 91/IRON/FOLIC/DHA 28-975-200
COMBINATION PACKAGE (EA) ORAL 2 TIMES DAILY
Qty: 30 G | Refills: 0 | Status: SHIPPED | OUTPATIENT
Start: 2022-12-28

## 2022-12-28 NOTE — TELEPHONE ENCOUNTER
Last Visit: 7/18/22 MD Osman Obando  Next Appointment: None  Previous Refill Encounter(s): 7/18/22 30g    Requested Prescriptions     Pending Prescriptions Disp Refills    terbinafine HCL (LAMISIL) 1 % topical cream 30 g 0     Sig: Apply  to affected area two (2) times a day. For Pharmacy Admin Tracking Only    Program: Medication Refill  CPA in place:   Recommendation Provided To:    Intervention Detail: New Rx: 1, reason: Patient Preference  Intervention Accepted By:   Jonny Amaya Closed?:   Time Spent (min): 5

## 2023-01-31 ENCOUNTER — OFFICE VISIT (OUTPATIENT)
Dept: FAMILY MEDICINE CLINIC | Age: 63
End: 2023-01-31
Payer: MEDICAID

## 2023-01-31 ENCOUNTER — OFFICE VISIT (OUTPATIENT)
Dept: FAMILY MEDICINE CLINIC | Age: 63
End: 2023-01-31

## 2023-01-31 VITALS
HEART RATE: 88 BPM | RESPIRATION RATE: 16 BRPM | DIASTOLIC BLOOD PRESSURE: 71 MMHG | TEMPERATURE: 98 F | WEIGHT: 183 LBS | HEIGHT: 66 IN | SYSTOLIC BLOOD PRESSURE: 105 MMHG | OXYGEN SATURATION: 97 % | BODY MASS INDEX: 29.41 KG/M2

## 2023-01-31 DIAGNOSIS — G89.29 CHRONIC LEFT SHOULDER PAIN: Primary | ICD-10-CM

## 2023-01-31 DIAGNOSIS — N52.9 ERECTILE DYSFUNCTION, UNSPECIFIED ERECTILE DYSFUNCTION TYPE: ICD-10-CM

## 2023-01-31 DIAGNOSIS — E11.65 TYPE 2 DIABETES MELLITUS WITH HYPERGLYCEMIA, WITHOUT LONG-TERM CURRENT USE OF INSULIN (HCC): ICD-10-CM

## 2023-01-31 DIAGNOSIS — E11.65 TYPE 2 DIABETES MELLITUS WITH HYPERGLYCEMIA, WITHOUT LONG-TERM CURRENT USE OF INSULIN (HCC): Primary | ICD-10-CM

## 2023-01-31 DIAGNOSIS — E78.00 HYPERCHOLESTEREMIA: ICD-10-CM

## 2023-01-31 DIAGNOSIS — K21.9 GASTROESOPHAGEAL REFLUX DISEASE, UNSPECIFIED WHETHER ESOPHAGITIS PRESENT: ICD-10-CM

## 2023-01-31 DIAGNOSIS — M25.512 CHRONIC LEFT SHOULDER PAIN: Primary | ICD-10-CM

## 2023-01-31 DIAGNOSIS — I50.22 SYSTOLIC CHF, CHRONIC (HCC): ICD-10-CM

## 2023-01-31 DIAGNOSIS — G47.33 OSA (OBSTRUCTIVE SLEEP APNEA): ICD-10-CM

## 2023-01-31 DIAGNOSIS — Z12.5 SCREENING FOR MALIGNANT NEOPLASM OF PROSTATE: ICD-10-CM

## 2023-01-31 DIAGNOSIS — I25.10 CORONARY ARTERY DISEASE INVOLVING NATIVE CORONARY ARTERY OF NATIVE HEART WITHOUT ANGINA PECTORIS: ICD-10-CM

## 2023-01-31 DIAGNOSIS — R10.9 RIGHT SIDED ABDOMINAL PAIN: ICD-10-CM

## 2023-01-31 PROCEDURE — 3074F SYST BP LT 130 MM HG: CPT | Performed by: FAMILY MEDICINE

## 2023-01-31 PROCEDURE — 3046F HEMOGLOBIN A1C LEVEL >9.0%: CPT | Performed by: FAMILY MEDICINE

## 2023-01-31 PROCEDURE — 99214 OFFICE O/P EST MOD 30 MIN: CPT | Performed by: FAMILY MEDICINE

## 2023-01-31 PROCEDURE — 3078F DIAST BP <80 MM HG: CPT | Performed by: FAMILY MEDICINE

## 2023-01-31 RX ORDER — TADALAFIL 20 MG/1
TABLET ORAL
Qty: 30 TABLET | Refills: 2 | Status: SHIPPED | OUTPATIENT
Start: 2023-01-31

## 2023-01-31 RX ORDER — LISINOPRIL 5 MG/1
5 TABLET ORAL DAILY
Qty: 90 TABLET | Refills: 1 | Status: SHIPPED | OUTPATIENT
Start: 2023-01-31

## 2023-01-31 RX ORDER — ATORVASTATIN CALCIUM 80 MG/1
80 TABLET, FILM COATED ORAL DAILY
Qty: 90 TABLET | Refills: 3 | Status: SHIPPED | OUTPATIENT
Start: 2023-01-31

## 2023-01-31 NOTE — PROGRESS NOTES
Pharmacy Progress Note     PCP requested this writer introduce self to pt. Introduced self and role in clinic. Offered to schedule in person visit to review nonpharm interventions for DM. Scheduled 2/13/23.       Juanjo Cui, PharmD, BCGP, BCACP  Clinical Pharmacist Specialist      For Pharmacy Admin Tracking Only    Program: Medical Group  CPA in place: Yes  Recommendation Provided To: Patient/Caregiver: 1 via In person  Intervention Detail: Scheduled Appointment  Intervention Accepted By: Patient/Caregiver: 1  Time Spent (min): 15

## 2023-01-31 NOTE — PROGRESS NOTES
Chief Complaint   Patient presents with    Hypertension    Diabetes         1. \"Have you been to the ER, urgent care clinic since your last visit? Hospitalized since your last visit? \" No    2. \"Have you seen or consulted any other health care providers outside of the [address] [place] [address] since your last visit? \" No     3. For patients over 45: Has the patient had a colonoscopy?  No       3 most recent PHQ Screens 1/31/2023   Little interest or pleasure in doing things Not at all   Feeling down, depressed, irritable, or hopeless Not at all   Total Score PHQ 2 0   Trouble falling or staying asleep, or sleeping too much -   Feeling tired or having little energy -   Poor appetite, weight loss, or overeating -   Feeling bad about yourself - or that you are a failure or have let yourself or your family down -   Trouble concentrating on things such as school, work, reading, or watching TV -   Moving or speaking so slowly that other people could have noticed; or the opposite being so fidgety that others notice -   Thoughts of being better off dead, or hurting yourself in some way -   PHQ 9 Score -   How difficult have these problems made it for you to do your work, take care of your home and get along with others -       Health Maintenance Due   Topic Date Due    Pneumococcal 0-64 years (1 - PCV) Never done    Foot Exam Q1  Never done    Eye Exam Retinal or Dilated  Never done    Colorectal Cancer Screening Combo  Never done    Shingles Vaccine (1 of 2) Never done    COVID-19 Vaccine (3 - Booster for Silva Peter series) 07/25/2021    Diabetic Alb to Cr ratio (uACR) test  03/09/2022    Flu Vaccine (1) 08/01/2022

## 2023-01-31 NOTE — PROGRESS NOTES
Casandra Castillo  58 y.o. male  1960  [address] [facility]  [id number]     [address] [facility]       Encounter Date: 1/31/2023           Established Patient Visit Note: Aurea Anderson MD    Reason for Appointment:  Chief Complaint   Patient presents with    Hypertension    Diabetes         History of Present Illness:  History provided by {Relatives - adult:5061}    Casandra Castillo is a 58 y.o. male who presents to clinic today for:     Patient was last seen on 7/18/22 and advised to follow up in 3 months, but he has not followed up until today. Candidiasis of Groin Area: he reports that this has resolved  Right Sided Abdominal Pain: he reports that this has resolved   Skin Lesion: he reports that this has resolved  Left Arm Pain: he reports that this has improved, but not back to 100 percent  DM2: Continues Tradjenta. Prior Medications: Metformin (he stopped taking d/t fatigue), Invokana (stopped d/t candidiasis of groin area). Glucose monitoring kit sent to his pharmacy and he reports picking up from pharmacy, but he never used it. ED: he reports as improved at 20mg dose of Cialis. He was provided referral to urology on 7/12/21, but he never scheduled this appointment. JAVIER on CPAP: he reports that he is compliant with CPAP  CAD: denies any recent dyspnea or CP. Followed by cardiology. He reports that he has not been taking the Toprol  GERD: he reports as well controlled     Screening  CCS: he ahs not yet scheduled apt with GI, but he states that he plans to schedule  He reports having EYE exam in 2022  PSA: he would like to have this checked.      Vaccines  COVID Vaccine: he is due for booster; advised to get at pharmacy  Flu Vaccine: he reports haivng this [facility]  Pneuponia Vaccine: he declines this  Shingles Vaccine: he declines this                  Review of Systems  All other ROS were reviewed and are negative except as discussed in [facility]         Health Maintenance Due   Topic Date Due    Pneumococcal 0-64 years (1 - PCV) Never done    Foot Exam Q1  Never done    Eye Exam Retinal or Dilated  Never done    Colorectal Cancer Screening Combo  Never done    Shingles Vaccine (1 of 2) Never done    COVID-19 Vaccine (3 - Booster for Pfizer series) 07/25/2021    Diabetic Alb to Cr ratio (uACR) test  03/09/2022    Flu Vaccine (1) 08/01/2022       Review of Systems  ROS       Allergies: Patient has no known allergies. Medications:     Current Outpatient Medications:     tadalafiL (Cialis) 10 mg tablet, Take 1 to 2 tabs at least 30 minutes prior to anticipated sexual activity as one single dose and not more than once daily. , Disp: 30 Tablet, Rfl: 2    lisinopriL (PRINIVIL, ZESTRIL) 5 mg tablet, Take 1 Tablet by mouth daily. , Disp: 90 Tablet, Rfl: 1    aspirin delayed-release 81 mg tablet, Take  by mouth daily. , Disp: , Rfl:     nitroglycerin (NITROSTAT) 0.4 mg SL tablet, 0.4 mg by SubLINGual route every five (5) minutes as needed for Chest Pain. Up to 3 doses. , Disp: , Rfl:     terbinafine HCL (LAMISIL) 1 % topical cream, Apply  to affected area two (2) times a day. (Patient not taking: Reported on 1/31/2023), Disp: 30 g, Rfl: 0    metoprolol succinate (TOPROL-XL) 50 mg XL tablet, Take 1 Tablet by mouth in the morning., Disp: 90 Tablet, Rfl: 3    atorvastatin (LIPITOR) 80 mg tablet, Take 1 Tablet by mouth in the morning., Disp: 90 Tablet, Rfl: 3    linaGLIPtin (TRADJENTA) 5 mg tablet, Take 1 Tablet by mouth daily. , Disp: 90 Tablet, Rfl: 1    canagliflozin (INVOKANA) 100 mg tablet, Take 1 Tablet by mouth Daily (before breakfast). , Disp: 90 Tablet, Rfl: 1    glucose blood VI test strips (blood glucose test) strip, Use to check fasting blood sugar daily. Patient may use whichever product is covered by insurance., Disp: 90 Strip, Rfl: 1    lancets misc, Use to check fasting blood sugar daily.  Patient may use whichever product is covered by insurance., Disp: 1 Each, Rfl: 11 Blood-Glucose Meter monitoring kit, Use to check fasting blood sugar daily. Patient may use whichever product is covered by insurance., Disp: 1 Kit, Rfl: 0    amLODIPine (NORVASC) 5 mg tablet, Take 1 Tab by mouth daily. , Disp: 90 Tab, Rfl: 3    History  Patient Care Team:  Jeanne Chin MD as PCP - General (Family Medicine)  Jeanne Chin MD as PCP [facility]  Julio Rosales MD as Consulting Provider (Cardiovascular Disease Physician)    Past Medical History: he has a past medical history of Asymmetric septal hypertrophy ([facility] [address] (9/8/2020), Coronary artery disease involving native coronary artery of native heart without angina pectoris (8/4/2020), Gastroesophageal reflux disease (4/9/2020), Hypertension, JAVIER (obstructive sleep apnea) (4/9/2020), Tobacco abuse (11/24/2018), and Type 2 diabetes mellitus with hyperglycemia, without long-term current use of insulin ([facility] (5/13/2020). Past Surgical History: he has no past surgical history on file. Family Medical History: family history includes Diabetes in his maternal grandfather, mother, and sister; Hypertension in his maternal grandfather, mother, and sister. Social History: he reports that he has been smoking. He has a 5.00 pack-year smoking history. He has never used smokeless tobacco. He reports that he does not currently use alcohol after a past usage of about 2.0 standard drinks per week. He reports that he does not use drugs.         Objective:   Visit Vitals  /71 (BP 1 Location: Left upper arm, BP Patient Position: Sitting, BP Cuff Size: Adult)   Pulse 88   Temp 98 °F (36.7 °C) (Temporal)   Resp 16   Ht 5' 6\" (1.676 m)   Wt 183 lb (83 kg)   SpO2 97%   BMI 29.54 kg/m²     Wt Readings from Last 3 Encounters:   01/31/23 183 lb (83 kg)   11/16/22 178 lb (80.7 kg)   07/18/22 178 lb 12.8 oz (81.1 kg)       Physical Exam    Assessment & Plan:    {No Diagnosis Found}    Jerrod reports that he has not taken metoprolol in several months. BP a little low today. Advised scheduling visit with his cardiologist to discuss restartign it. He reports having glucometer at home, advised to starting checking sugars. I have discussed the diagnosis with the patient and the intended plan as seen in the above orders. The patient has received an after-visit summary along with patient information handout. I have discussed medication side effects and warnings with the patient as well.     Disposition        Karla Menjivar MD specialist as scheduled. Discussed recommendations for diet and exercise. I have discussed the diagnosis with the patient and the intended plan as seen in the above orders. The patient has received an after-visit summary along with patient information handout. I have discussed medication side effects and warnings with the patient as well. Disposition  Follow-up and Dispositions    Return in about 3 months (around 4/30/2023) for follow up of chronic conditions.            Ute Chicas MD

## 2023-02-01 DIAGNOSIS — E11.65 TYPE 2 DIABETES MELLITUS WITH HYPERGLYCEMIA, WITHOUT LONG-TERM CURRENT USE OF INSULIN (HCC): Primary | ICD-10-CM

## 2023-02-01 LAB
ALBUMIN SERPL-MCNC: 3.9 G/DL (ref 3.5–5)
ALBUMIN/GLOB SERPL: 1.1 (ref 1.1–2.2)
ALP SERPL-CCNC: 134 U/L (ref 45–117)
ALT SERPL-CCNC: 20 U/L (ref 12–78)
ANION GAP SERPL CALC-SCNC: 4 MMOL/L (ref 5–15)
APPEARANCE UR: CLEAR
AST SERPL-CCNC: 9 U/L (ref 15–37)
BILIRUB SERPL-MCNC: 0.3 MG/DL (ref 0.2–1)
BILIRUB UR QL: NEGATIVE
BUN SERPL-MCNC: 7 MG/DL (ref 6–20)
BUN/CREAT SERPL: 8 (ref 12–20)
CALCIUM SERPL-MCNC: 9.5 MG/DL (ref 8.5–10.1)
CHLORIDE SERPL-SCNC: 104 MMOL/L (ref 97–108)
CHOLEST SERPL-MCNC: 158 MG/DL
CO2 SERPL-SCNC: 28 MMOL/L (ref 21–32)
COLOR UR: ABNORMAL
CREAT SERPL-MCNC: 0.88 MG/DL (ref 0.7–1.3)
CREAT UR-MCNC: 51.7 MG/DL
ERYTHROCYTE [DISTWIDTH] IN BLOOD BY AUTOMATED COUNT: 15 % (ref 11.5–14.5)
EST. AVERAGE GLUCOSE BLD GHB EST-MCNC: 263 MG/DL
GLOBULIN SER CALC-MCNC: 3.6 G/DL (ref 2–4)
GLUCOSE SERPL-MCNC: 390 MG/DL (ref 65–100)
GLUCOSE UR STRIP.AUTO-MCNC: >1000 MG/DL
HBA1C MFR BLD: 10.8 % (ref 4–5.6)
HCT VFR BLD AUTO: 41.2 % (ref 36.6–50.3)
HDLC SERPL-MCNC: 62 MG/DL
HDLC SERPL: 2.5 (ref 0–5)
HGB BLD-MCNC: 13.9 G/DL (ref 12.1–17)
HGB UR QL STRIP: NEGATIVE
KETONES UR QL STRIP.AUTO: NEGATIVE MG/DL
LDLC SERPL CALC-MCNC: 69.2 MG/DL (ref 0–100)
LEUKOCYTE ESTERASE UR QL STRIP.AUTO: NEGATIVE
MCH RBC QN AUTO: 25.2 PG (ref 26–34)
MCHC RBC AUTO-ENTMCNC: 33.7 G/DL (ref 30–36.5)
MCV RBC AUTO: 74.8 FL (ref 80–99)
MICROALBUMIN UR-MCNC: 1.26 MG/DL
MICROALBUMIN/CREAT UR-RTO: 24 MG/G (ref 0–30)
NITRITE UR QL STRIP.AUTO: NEGATIVE
NRBC # BLD: 0 K/UL (ref 0–0.01)
NRBC BLD-RTO: 0 PER 100 WBC
PH UR STRIP: 5.5 (ref 5–8)
PLATELET # BLD AUTO: 315 K/UL (ref 150–400)
PMV BLD AUTO: 10.4 FL (ref 8.9–12.9)
POTASSIUM SERPL-SCNC: 4 MMOL/L (ref 3.5–5.1)
PROT SERPL-MCNC: 7.5 G/DL (ref 6.4–8.2)
PROT UR STRIP-MCNC: NEGATIVE MG/DL
PSA SERPL-MCNC: 0.8 NG/ML (ref 0.01–4)
RBC # BLD AUTO: 5.51 M/UL (ref 4.1–5.7)
SODIUM SERPL-SCNC: 136 MMOL/L (ref 136–145)
SP GR UR REFRACTOMETRY: 1.01 (ref 1–1.03)
TRIGL SERPL-MCNC: 134 MG/DL (ref ?–150)
TSH SERPL DL<=0.05 MIU/L-ACNC: 0.6 UIU/ML (ref 0.36–3.74)
UROBILINOGEN UR QL STRIP.AUTO: 0.2 EU/DL (ref 0.2–1)
VLDLC SERPL CALC-MCNC: 26.8 MG/DL
WBC # BLD AUTO: 7.2 K/UL (ref 4.1–11.1)

## 2023-02-01 RX ORDER — GLIPIZIDE 5 MG/1
5 TABLET ORAL 2 TIMES DAILY
Qty: 60 TABLET | Refills: 1 | Status: SHIPPED | OUTPATIENT
Start: 2023-02-01

## 2023-02-01 NOTE — PROGRESS NOTES
Called patient and discussed elevated glucose and A1c. Will start glipizide 5mg bid. Advised patient to ensure that he is checking blood glucose regularly to monitor. Discussed hypoglycemia precautions. Advised scheduling office visit with me in 2 weeks to discuss further.

## 2023-03-31 DIAGNOSIS — E11.65 TYPE 2 DIABETES MELLITUS WITH HYPERGLYCEMIA, WITHOUT LONG-TERM CURRENT USE OF INSULIN (HCC): ICD-10-CM

## 2023-03-31 RX ORDER — GLIPIZIDE 5 MG/1
5 TABLET ORAL 2 TIMES DAILY
Qty: 60 TABLET | Refills: 0 | Status: SHIPPED | OUTPATIENT
Start: 2023-03-31

## 2023-04-20 DIAGNOSIS — N52.9 ERECTILE DYSFUNCTION, UNSPECIFIED ERECTILE DYSFUNCTION TYPE: ICD-10-CM

## 2023-04-20 NOTE — TELEPHONE ENCOUNTER
Patient has scheduled w/NP Samantha and asking for refill til appt. Thanks, Latanya Díaz    Last Visit: 1/31/23 MD David Gonzalez  Next Appointment: 5/24/23 General Siad  Previous Refill Encounter(s): 1/31/23 30 + 2    Requested Prescriptions     Pending Prescriptions Disp Refills    tadalafiL (Cialis) 20 mg tablet 30 Tablet 2     Sig: Take 1 tab at least 30 minutes prior to anticipated sexual activity as one single dose and not more than once daily.

## 2023-04-20 NOTE — TELEPHONE ENCOUNTER
Spoke to pt on 4.20.23 informed the pt that  was no longer with the practice and that he would brian to choose another PCP. \"Pt stated he was unaware that  was no longer with the practice\" pt's scheduled to see NP Samantha on 5.24.23 @ 10:45 AM pt would like if one of the provider's could fill his Cialis until his appt?

## 2023-04-20 NOTE — TELEPHONE ENCOUNTER
Last Visit: 1/31/23 MD Gladys Hilario  Next Appointment: None- has not scheduled w/new provider-Please contact patient-if schedules-route to that provider. Previous Refill Encounter(s): 1/31/23 30 + 2 (last refill 3/25/23)    Requested Prescriptions     Pending Prescriptions Disp Refills    tadalafiL (Cialis) 20 mg tablet 30 Tablet 2     Sig: Take 1 tab at least 30 minutes prior to anticipated sexual activity as one single dose and not more than once daily.      For Pharmacy Admin Tracking Only    Program: Medication Refill  Intervention Detail: New Rx: 1, reason: Patient Preference  Time Spent (min): 5

## 2023-04-21 RX ORDER — TADALAFIL 20 MG/1
TABLET ORAL
Qty: 30 TABLET | Refills: 2 | Status: SHIPPED | OUTPATIENT
Start: 2023-04-21

## 2023-05-15 ENCOUNTER — OFFICE VISIT (OUTPATIENT)
Age: 63
End: 2023-05-15
Payer: MEDICAID

## 2023-05-15 ENCOUNTER — TELEPHONE (OUTPATIENT)
Age: 63
End: 2023-05-15

## 2023-05-15 VITALS
SYSTOLIC BLOOD PRESSURE: 130 MMHG | HEIGHT: 66 IN | HEART RATE: 77 BPM | BODY MASS INDEX: 29.25 KG/M2 | RESPIRATION RATE: 16 BRPM | WEIGHT: 182 LBS | OXYGEN SATURATION: 98 % | DIASTOLIC BLOOD PRESSURE: 82 MMHG

## 2023-05-15 DIAGNOSIS — I10 ESSENTIAL HYPERTENSION: ICD-10-CM

## 2023-05-15 DIAGNOSIS — I42.2 ASYMMETRIC SEPTAL HYPERTROPHY (HCC): ICD-10-CM

## 2023-05-15 DIAGNOSIS — R10.13 EPIGASTRIC PAIN: ICD-10-CM

## 2023-05-15 DIAGNOSIS — I25.110 CORONARY ARTERY DISEASE INVOLVING NATIVE CORONARY ARTERY OF NATIVE HEART WITH UNSTABLE ANGINA PECTORIS (HCC): Primary | ICD-10-CM

## 2023-05-15 DIAGNOSIS — E78.00 HYPERCHOLESTEREMIA: ICD-10-CM

## 2023-05-15 PROBLEM — I25.10 CORONARY ARTERY DISEASE INVOLVING NATIVE CORONARY ARTERY OF NATIVE HEART WITHOUT ANGINA PECTORIS: Status: ACTIVE | Noted: 2020-08-04

## 2023-05-15 PROCEDURE — 99214 OFFICE O/P EST MOD 30 MIN: CPT | Performed by: SPECIALIST

## 2023-05-15 PROCEDURE — 93005 ELECTROCARDIOGRAM TRACING: CPT | Performed by: SPECIALIST

## 2023-05-15 RX ORDER — TIMOLOL MALEATE 5 MG/ML
SOLUTION/ DROPS OPHTHALMIC
COMMUNITY
Start: 2023-03-06

## 2023-05-15 RX ORDER — NITROGLYCERIN 0.4 MG/1
0.4 TABLET SUBLINGUAL EVERY 5 MIN PRN
Qty: 25 TABLET | Refills: 5 | Status: SHIPPED | OUTPATIENT
Start: 2023-05-15

## 2023-05-15 ASSESSMENT — PATIENT HEALTH QUESTIONNAIRE - PHQ9
1. LITTLE INTEREST OR PLEASURE IN DOING THINGS: 0
SUM OF ALL RESPONSES TO PHQ QUESTIONS 1-9: 0
SUM OF ALL RESPONSES TO PHQ QUESTIONS 1-9: 0
SUM OF ALL RESPONSES TO PHQ9 QUESTIONS 1 & 2: 0
2. FEELING DOWN, DEPRESSED OR HOPELESS: 0
SUM OF ALL RESPONSES TO PHQ QUESTIONS 1-9: 0
SUM OF ALL RESPONSES TO PHQ QUESTIONS 1-9: 0

## 2023-05-15 NOTE — PROGRESS NOTES
Andrews Chandler     1960       Son Coleman MD, [facility]  Date of Visit-5/15/2023   PCP is Delphine Cazares MD   [facility] [place]  [facility]  HPI:  Andrews Chandler is a 61 y.o. male   One year follow up   CAD. NSTEMI /stent   Biotronic stent to the LAD 3.5 mm x 13 mm. 6/27/2020 [facility] in [place]     July 2020 we reduced Toprol due to fatigue and recommended cardiac rehab. Toprol stopped 9/27/21  ECHO  asymmetric septal hypertrophy with normal LVEF of 63%. Prior EKG had demonstrated moderately deep symmetric negative T-waves. Likely this is [facility]. diabetes since seen in the ER at [facility] in March 2020, HTN and CP along with tobacco use. Stress echo 11/11/21, walked 4:16, normal DELROY  Today. .  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 When he has the chest pain he has to stop his work he is not able to complete yard work at this time. He also has abdominal pain diaphoresis mild lower extremity edema. He denies palpitations tachycardia fever or syncope. Echo 11/2022= EF 63% , severe basal thickening at 1.8 and posterior at 1.4 cm, had LVOT at rest of 12 mm Hg, aortic sclerosis without stenosis , mild chordal YUDY  EKG: Sinus rhythm incomplete right bundle branch block  RSR prime in V1 nonspecific T wave findings  Systolic murmur is present  Assessment/Plan:     Patient Instructions   Labs and xrays today. Start verapamil 120mg daily. You will be scheduled for a heart catheterization. You have been scheduled for an echo. We will see you back in about 2 months. [facility]  [place]  Monday-Friday 7A-5P  [phone]     1.  Coronary artery disease involving native coronary artery of native heart with unstable angina pectoris ([facility] [address]  Previous [facility]    WRITTEN HEALTHCARE AND DISCHARGE INFORMATION  Follow-up Information     [facility]. Schedule an appointment as soon as possible for a visit in 1 week.    Why:  for Cardiology follow up  Contact information:  [facility] [address]  [phone]               PLEASE BRING TO ALL FOLLOW UP APPOINTMENTS:   1) A COPY YOUR DISCHARGE INSTRUCTIONS   2) ALL MEDICINES YOU ARE CURRENTLY TAKING IN THEIR ORIGINAL BOTTLES   3) IDENTIFICATION CARD   4) INSURANCE CARD    **PLEASE ARRIVE 15 MINUTES AHEAD OF SCHEDULED APPOINTMENT TIME   ++PLEASE CALL 24 HOURS IN ADVANCE IF YOU MUST RESCHEDULE YOUR APPOINTMENT DAY AND/OR TIME             Help at Home           [phone]  After discharge for assistance [facility] On Call Nurse Care Line 24/7  Assistance     Things You are responsible For To Manage Your Care At Home:  1.    Getting your prescriptions filled   2.    Taking your medications as directed, DO NOT MISS ANY DOSES!  3.    Going to your follow-up doctor appointment. This is important because it  allow the doctor to monitor your progress and determine if  any changes need to made to your treatment plan.     Thank you for choosing [facility] for your care.  Please answer any calls you may receive from [facility] we want to continue to support you as you manage your healthcare needs. [facility] is happy to have the opportunity to serve you.      Sincerely,  Your [facility] Healthcare Team,  AIDA Grover, ACM-RN; [facility]  [phone]

## 2023-05-15 NOTE — ASSESSMENT & PLAN NOTE
NSTEMI June 2020 in [place] with stent to LAD  Fatigue with Toprol stopped 2021  Prior EKG with deep T wave inversions due to RAÓMN  Hx of tobacco use   Asymptomatic, continue current medications

## 2023-05-15 NOTE — TELEPHONE ENCOUNTER
----- Message from Aparna Crandall RN sent at 5/15/2023 10:00 AM EDT -----  This young man needs a C for unstable angina    Labs today    Hold DM meds morning of test - tradjenta and glipizide     I25.110    Spoke to patient and scheduled him for [facility] with Dr. Allyson Ron 5/31/23 @ 10:45 am with 8:45 am arrival. Patient asked that instructions be sent to his my chart as he was unable to write them down at the time of call. Patient identification verified x2. Patient Instructions    Catheterization   Pre-procedure instructions  3-4 days prior to procedure  Lab work: Please do by 5/26/23  Which will be in [facility] records, or a printed copy to go to [address] [place] [address]  The night before the procedure nothing to eat or drink after midnight, you may take approved medications the morning of the procedure with a few sips of water. Stop blood thinners 2 days prior to procedure EXCEPT: Brilinta, Plavix or Aspirin  Medications restrictions: Please hold Tradjenta and Glipizide the morning of the procedure.     Procedure day  Have a  that will bring you and take you home (6-8 hours)  Have a responsible adult that can stay with you for 24 hours  Bring ID and insurance card  Wear comfortable clothing  Leave valuables at home, bring: dentures, hearing aids, or glasses  Bring overnight bag (just in case of an overnight stay)  Where to report  [facility]: go through main entrance and to the left is outpatient registration      Date of procedure: 5/31/23 w/ Dr. Anthony Navarro Time: 8:45 am    Post procedure instructions  No driving for 24 hours  No heavy lifting (over 10lbs) or strenuous activity for 48 hours  No baths, swimming, hot tubs, or spas for a week  The band aid over the cath site may be removed the day after procedure and washed gently with soap and water  The site may be bruised or discolored for a couple of weeks, a small knot may also be

## 2023-05-15 NOTE — PATIENT INSTRUCTIONS
Labs and xrays today. Start verapamil 120mg daily. You will be scheduled for a heart catheterization. You have been scheduled for an echo. We will see you back in about 2 months.      Heart and Vascular [facility]  Jignesh Stover., [address] Ta  [address] [facility] 7A-5P  [phone]

## 2023-05-18 RX ORDER — SODIUM CHLORIDE 0.9 % (FLUSH) 0.9 %
5-40 SYRINGE (ML) INJECTION EVERY 12 HOURS SCHEDULED
OUTPATIENT
Start: 2023-05-18

## 2023-05-18 RX ORDER — SODIUM CHLORIDE 9 MG/ML
INJECTION, SOLUTION INTRAVENOUS CONTINUOUS
OUTPATIENT
Start: 2023-05-18 | End: 2023-05-18

## 2023-05-26 ENCOUNTER — HOSPITAL ENCOUNTER (OUTPATIENT)
Facility: HOSPITAL | Age: 63
End: 2023-05-26
Payer: MEDICAID

## 2023-05-26 DIAGNOSIS — I25.110 CORONARY ARTERY DISEASE INVOLVING NATIVE CORONARY ARTERY OF NATIVE HEART WITH UNSTABLE ANGINA PECTORIS (HCC): ICD-10-CM

## 2023-05-26 LAB
ANION GAP SERPL CALC-SCNC: 3 MMOL/L (ref 5–15)
BUN SERPL-MCNC: 8 MG/DL (ref 6–20)
BUN/CREAT SERPL: 10 (ref 12–20)
CA-I BLD-MCNC: 9 MG/DL (ref 8.5–10.1)
CHLORIDE SERPL-SCNC: 107 MMOL/L (ref 97–108)
CO2 SERPL-SCNC: 28 MMOL/L (ref 21–32)
CREAT SERPL-MCNC: 0.78 MG/DL (ref 0.7–1.3)
ERYTHROCYTE [DISTWIDTH] IN BLOOD BY AUTOMATED COUNT: 14.9 % (ref 11.5–14.5)
GLUCOSE SERPL-MCNC: 225 MG/DL (ref 65–100)
HCT VFR BLD AUTO: 36.1 % (ref 36.6–50.3)
HGB BLD-MCNC: 12.6 G/DL (ref 12.1–17)
MCH RBC QN AUTO: 26.3 PG (ref 26–34)
MCHC RBC AUTO-ENTMCNC: 34.9 G/DL (ref 30–36.5)
MCV RBC AUTO: 75.2 FL (ref 80–99)
NRBC # BLD: 0 K/UL (ref 0–0.01)
NRBC BLD-RTO: 0 PER 100 WBC
PLATELET # BLD AUTO: 335 K/UL (ref 150–400)
PMV BLD AUTO: 9.7 FL (ref 8.9–12.9)
POTASSIUM SERPL-SCNC: 3.3 MMOL/L (ref 3.5–5.1)
RBC # BLD AUTO: 4.8 M/UL (ref 4.1–5.7)
SODIUM SERPL-SCNC: 138 MMOL/L (ref 136–145)
WBC # BLD AUTO: 7.6 K/UL (ref 4.1–11.1)

## 2023-05-26 PROCEDURE — 80048 BASIC METABOLIC PNL TOTAL CA: CPT

## 2023-05-26 PROCEDURE — 36415 COLL VENOUS BLD VENIPUNCTURE: CPT

## 2023-05-26 PROCEDURE — 85027 COMPLETE CBC AUTOMATED: CPT

## 2023-05-30 ENCOUNTER — TELEPHONE (OUTPATIENT)
Age: 63
End: 2023-05-30

## 2023-05-30 NOTE — TELEPHONE ENCOUNTER
----- Message from Yvonne Ng MD sent at 5/27/2023 12:25 AM EDT -----    pre cath labs reviewed and acceptable    Spoke to patient and advised him that Dr. Jazmin Sosa reviewed his labs and they are acceptable and he is ok to proceed with cath. Patient identification verified x2.

## 2023-05-31 ENCOUNTER — HOSPITAL ENCOUNTER (OUTPATIENT)
Facility: HOSPITAL | Age: 63
Discharge: HOME | End: 2023-05-31
Attending: INTERNAL MEDICINE | Admitting: INTERNAL MEDICINE
Payer: MEDICAID

## 2023-05-31 ENCOUNTER — TELEPHONE (OUTPATIENT)
Age: 63
End: 2023-05-31

## 2023-05-31 VITALS
OXYGEN SATURATION: 96 % | TEMPERATURE: 98.1 F | HEART RATE: 61 BPM | RESPIRATION RATE: 16 BRPM | DIASTOLIC BLOOD PRESSURE: 82 MMHG | HEIGHT: 66 IN | BODY MASS INDEX: 29.41 KG/M2 | WEIGHT: 183 LBS | SYSTOLIC BLOOD PRESSURE: 113 MMHG

## 2023-05-31 DIAGNOSIS — I25.10 CORONARY ARTERY DISEASE DUE TO LIPID RICH PLAQUE: ICD-10-CM

## 2023-05-31 DIAGNOSIS — I25.83 CORONARY ARTERY DISEASE DUE TO LIPID RICH PLAQUE: ICD-10-CM

## 2023-05-31 LAB
ACT BLD: 281 SECS (ref 79–138)
ECHO BSA: 1.97 M2
GLUCOSE BLD STRIP.AUTO-MCNC: 180 MG/DL (ref 65–117)
SERVICE CMNT-IMP: ABNORMAL

## 2023-05-31 PROCEDURE — C1894 INTRO/SHEATH, NON-LASER: HCPCS | Performed by: INTERNAL MEDICINE

## 2023-05-31 PROCEDURE — 99152 MOD SED SAME PHYS/QHP 5/>YRS: CPT | Performed by: INTERNAL MEDICINE

## 2023-05-31 PROCEDURE — C1713 ANCHOR/SCREW BN/BN,TIS/BN: HCPCS | Performed by: INTERNAL MEDICINE

## 2023-05-31 PROCEDURE — 93571 IV DOP VEL&/PRESS C FLO 1ST: CPT | Performed by: INTERNAL MEDICINE

## 2023-05-31 PROCEDURE — 82962 GLUCOSE BLOOD TEST: CPT

## 2023-05-31 PROCEDURE — 85347 COAGULATION TIME ACTIVATED: CPT

## 2023-05-31 PROCEDURE — 93458 L HRT ARTERY/VENTRICLE ANGIO: CPT | Performed by: INTERNAL MEDICINE

## 2023-05-31 PROCEDURE — C1769 GUIDE WIRE: HCPCS | Performed by: INTERNAL MEDICINE

## 2023-05-31 PROCEDURE — 6360000004 HC RX CONTRAST MEDICATION: Performed by: INTERNAL MEDICINE

## 2023-05-31 PROCEDURE — 2709999900 HC NON-CHARGEABLE SUPPLY: Performed by: INTERNAL MEDICINE

## 2023-05-31 PROCEDURE — 6360000002 HC RX W HCPCS: Performed by: INTERNAL MEDICINE

## 2023-05-31 PROCEDURE — 76937 US GUIDE VASCULAR ACCESS: CPT | Performed by: INTERNAL MEDICINE

## 2023-05-31 PROCEDURE — C1887 CATHETER, GUIDING: HCPCS | Performed by: INTERNAL MEDICINE

## 2023-05-31 PROCEDURE — 99153 MOD SED SAME PHYS/QHP EA: CPT | Performed by: INTERNAL MEDICINE

## 2023-05-31 PROCEDURE — 2500000003 HC RX 250 WO HCPCS: Performed by: INTERNAL MEDICINE

## 2023-05-31 PROCEDURE — 2580000003 HC RX 258: Performed by: INTERNAL MEDICINE

## 2023-05-31 RX ORDER — SODIUM CHLORIDE 0.9 % (FLUSH) 0.9 %
5-40 SYRINGE (ML) INJECTION PRN
Status: DISCONTINUED | OUTPATIENT
Start: 2023-05-31 | End: 2023-05-31 | Stop reason: HOSPADM

## 2023-05-31 RX ORDER — VERAPAMIL HYDROCHLORIDE 2.5 MG/ML
INJECTION, SOLUTION INTRAVENOUS PRN
Status: DISCONTINUED | OUTPATIENT
Start: 2023-05-31 | End: 2023-05-31 | Stop reason: HOSPADM

## 2023-05-31 RX ORDER — LIDOCAINE HYDROCHLORIDE 10 MG/ML
INJECTION, SOLUTION INFILTRATION; PERINEURAL PRN
Status: DISCONTINUED | OUTPATIENT
Start: 2023-05-31 | End: 2023-05-31 | Stop reason: HOSPADM

## 2023-05-31 RX ORDER — HEPARIN SODIUM 1000 [USP'U]/ML
INJECTION, SOLUTION INTRAVENOUS; SUBCUTANEOUS PRN
Status: DISCONTINUED | OUTPATIENT
Start: 2023-05-31 | End: 2023-05-31 | Stop reason: HOSPADM

## 2023-05-31 RX ORDER — SODIUM CHLORIDE 0.9 % (FLUSH) 0.9 %
5-40 SYRINGE (ML) INJECTION EVERY 12 HOURS SCHEDULED
Status: DISCONTINUED | OUTPATIENT
Start: 2023-05-31 | End: 2023-05-31 | Stop reason: HOSPADM

## 2023-05-31 RX ORDER — HEPARIN SODIUM 200 [USP'U]/100ML
INJECTION, SOLUTION INTRAVENOUS CONTINUOUS PRN
Status: COMPLETED | OUTPATIENT
Start: 2023-05-31 | End: 2023-05-31

## 2023-05-31 RX ORDER — SODIUM CHLORIDE 9 MG/ML
INJECTION, SOLUTION INTRAVENOUS CONTINUOUS
Status: DISPENSED | OUTPATIENT
Start: 2023-05-31 | End: 2023-05-31

## 2023-05-31 RX ORDER — FENTANYL CITRATE 50 UG/ML
INJECTION, SOLUTION INTRAMUSCULAR; INTRAVENOUS PRN
Status: DISCONTINUED | OUTPATIENT
Start: 2023-05-31 | End: 2023-05-31 | Stop reason: HOSPADM

## 2023-05-31 RX ORDER — MIDAZOLAM HYDROCHLORIDE 1 MG/ML
INJECTION INTRAMUSCULAR; INTRAVENOUS PRN
Status: DISCONTINUED | OUTPATIENT
Start: 2023-05-31 | End: 2023-05-31 | Stop reason: HOSPADM

## 2023-05-31 RX ORDER — SODIUM CHLORIDE 9 MG/ML
INJECTION, SOLUTION INTRAVENOUS CONTINUOUS
Status: DISCONTINUED | OUTPATIENT
Start: 2023-05-31 | End: 2023-05-31 | Stop reason: HOSPADM

## 2023-05-31 RX ORDER — ACETAMINOPHEN 325 MG/1
650 TABLET ORAL EVERY 4 HOURS PRN
Status: DISCONTINUED | OUTPATIENT
Start: 2023-05-31 | End: 2023-05-31 | Stop reason: HOSPADM

## 2023-05-31 RX ADMIN — SODIUM CHLORIDE: 9 INJECTION, SOLUTION INTRAVENOUS at 09:47

## 2023-05-31 NOTE — DISCHARGE INSTRUCTIONS
[address] [facility] Cardiology  [facility] [phone]                                           Radial Cardiac Catheterization Discharge Instructions    It is normal to feel tired the first couple days. Take it easy and follow the physicians instructions. CHECK THE CATHETER INSERTION SITE DAILY:    Remove the wrist dressing 24 hours after the procedure. You may shower 24 hours after the procedure. Wash with soap and water and pat dry. Gentle cleaning of the site with soap and water is sufficient, cover with a dry clean dressing or bandage. Do not apply creams or powders to the area. No soaking the wrist for 3 days. Leave the puncture site open to air after 24 hours post-procedure. CALL THE PHYSICIANS:     If the site becomes red, swollen or feels warm to the touch  If there is bleeding or drainage or if there is unusual pain at the radial site. If there is any minor oozing, you may apply a band-aid and remove after 12 hours. If the bleeding continues, hold pressure with the middle finger against the puncture site and the thumb against the back of the wrist,call 911 to be transported to the hospital.  DO NOT DRIVE YOURSELF, [place] [address]. ACTIVITY:   For the first 24 hours do not manipulate the wrist.  No lifting, pushing or pulling over 3-5 pounds with the affected wrist for 7 daysand no straining the insertion site. Do not life grocery bags or the garbage can, do not run the vacuum  or  for 7 days. Start with short walks as in the hospital and gradually increase as tolerated each day. It is recommended to walk 30 minutes 5-7 days per week. Follow your physicians instructions on activity. Avoid walking outside in extremes of heat or cold. Walk inside when it is cold and windy or hot and humid. Things to keep in mind:  No driving for at least 24 hours, or as designated by your physician.   Limit the number of times you go up and down

## 2023-05-31 NOTE — PROGRESS NOTES
Cardiac Cath Lab Procedure Area Arrival Note:    Marce Gómez arrived to Cardiac Cath Lab, Procedure Area. Patient identifiers verified with NAME and DATE OF BIRTH. Procedure verified with patient. Consent forms verified. Allergies verified. Patient informed of procedure and plan of care. Questions answered with review. Patient voiced understanding of procedure and plan of care. Patient on cardiac monitor, non-invasive blood pressure, SPO2 monitor. On RA and placed on O2 @ 2 lpm via NC.  IV of NS on pump at 75 ml/hr. Patient status doing well without problems. Patient is A&Ox 4. Patient reports no CP and no SOB. Patient medicated during procedure with orders obtained and verified by Dr. Viet Hill. Refer to patients Cardiac Cath Lab PROCEDURE REPORT for vital signs, assessment, status, and response during procedure, printed at end of case. Printed report on chart or scanned into chart. 26 CATH LAB to RECOVERY ROOM REPORT    Procedure: [facility]    MD: Eusebia Gomez. Viet Hill MD    The procedure was diagnostic only. Verbal Report given to Recovery Nurse on patient being transferred to Cardiac Cath Lab RR for routine post-op. Patient stable upon transfer to . Vitals, mental status, MAR, procedural summary discussed with recovery RN.     Medication given during procedure:    Contrast:60mL                          Heparin:8,500units     Versed:2mg                                                               Fentanyl:25mcg                                                           Sheaths:    Right radial sheath pulled at 1211 , band at 10mL of air

## 2023-05-31 NOTE — PROGRESS NOTES
TRANSFER - IN REPORT:    Verbal report received from Jo POLLACK on Azalia San  being received from Cath lab procedure area  for routine progression of patient care. Report consisted of patients Situation, Background, Assessment and Recommendations(SBAR). Information from the following report(s) Kardex and Procedure Summary was reviewed with the receiving clinician. Opportunity for questions and clarification was provided. Assessment completed upon patients arrival to [address] [place] [address] and care assumed. Cardiac Cath Lab Recovery Arrival Note:    Azalia San arrived to [facility] recovery area. Patient procedure= LHC. Patient on cardiac monitor, non-invasive blood pressure, SPO2 monitor. On RA . IV  of NS on pump at 50 ml/hr. Patient status doing well without problems. Patient is A&Ox 3. Patient reports no pain. PROCEDURE SITE CHECK:    Procedure site:without any bleeding or no pain, reported at procedure site. No change in patient status. Continue to monitor patient and status.

## 2023-05-31 NOTE — PROGRESS NOTES
Cardiac Cath Lab Recovery Arrival Note:      Cuca Sheth arrived to Cardiac Cath Lab, Recovery Area. Staff introduced to patient. Patient identifiers verified with NAME and DATE OF BIRTH. Procedure verified with patient. Consent forms reviewed and signed by patient or authorized representative and verified. Allergies verified. Patient informed of procedure and plan of care. Questions answered with review. Patient prepped for procedure, per orders from physician, prior to arrival.    Patient on cardiac monitor, non-invasive blood pressure, SPO2 monitor. Patient is A&Ox 4. Patient reports no complaints. Patient in stretcher, in low position, with side rails up, call bell within reach, patient instructed to call of assistance as needed. Patient prep in: [facility] Recovery Area, Bed# FT 2. Family in: Waiting room.    Prep by: Anamika Nelson RN

## 2023-06-05 RX ORDER — GLIPIZIDE 5 MG/1
5 TABLET ORAL 2 TIMES DAILY
Qty: 60 TABLET | Refills: 0 | Status: SHIPPED | OUTPATIENT
Start: 2023-06-05

## 2023-06-05 NOTE — TELEPHONE ENCOUNTER
Last appointment: 1/31/23 MD Mark Livingston  Next appointment: None- no show 5/24/23 Juan Heck- patient aware and advised to call the office to reschedule- patient viewed message 5/30/23.   Please contact patient to reschedule  Previous refill encounter(s): 3/31/23 60    Requested Prescriptions     Pending Prescriptions Disp Refills    glipiZIDE (GLUCOTROL) 5 MG tablet 60 tablet 0     Sig: Take 1 tablet by mouth 2 times daily     For Pharmacy Admin Tracking Only    Program: Medication Refill   Intervention Detail: New Rx: 1, reason: Patient Preference  Time Spent (min): 5

## 2023-06-05 NOTE — TELEPHONE ENCOUNTER
Temporary refill granted. Needs appointment to establish with new provider as PCP is no longer with the practice.

## 2023-06-26 ENCOUNTER — ANCILLARY PROCEDURE (OUTPATIENT)
Age: 63
End: 2023-06-26
Payer: MEDICAID

## 2023-06-26 VITALS — HEIGHT: 66 IN | BODY MASS INDEX: 29.41 KG/M2 | HEART RATE: 70 BPM | WEIGHT: 183 LBS

## 2023-06-26 DIAGNOSIS — I42.2 ASYMMETRIC SEPTAL HYPERTROPHY (HCC): ICD-10-CM

## 2023-06-26 DIAGNOSIS — I10 ESSENTIAL HYPERTENSION: ICD-10-CM

## 2023-06-26 DIAGNOSIS — I25.110 CORONARY ARTERY DISEASE INVOLVING NATIVE CORONARY ARTERY OF NATIVE HEART WITH UNSTABLE ANGINA PECTORIS (HCC): ICD-10-CM

## 2023-06-26 DIAGNOSIS — R10.13 EPIGASTRIC PAIN: ICD-10-CM

## 2023-06-26 PROCEDURE — 93306 TTE W/DOPPLER COMPLETE: CPT

## 2023-07-01 LAB
ECHO AO ASC DIAM: 3.1 CM
ECHO AO ASCENDING AORTA INDEX: 1.61 CM/M2
ECHO AO ROOT DIAM: 3.6 CM
ECHO AO ROOT INDEX: 1.87 CM/M2
ECHO AV AREA PEAK VELOCITY: 2.4 CM2
ECHO AV AREA VTI: 2.8 CM2
ECHO AV AREA/BSA PEAK VELOCITY: 1.2 CM2/M2
ECHO AV AREA/BSA VTI: 1.5 CM2/M2
ECHO AV MEAN GRADIENT: 5 MMHG
ECHO AV MEAN VELOCITY: 1 M/S
ECHO AV PEAK GRADIENT: 10 MMHG
ECHO AV PEAK VELOCITY: 1.6 M/S
ECHO AV VELOCITY RATIO: 0.69
ECHO AV VTI: 25.8 CM
ECHO BSA: 1.97 M2
ECHO EST RA PRESSURE: 8 MMHG
ECHO IVC PROX: 2.2 CM
ECHO LA DIAMETER INDEX: 2.23 CM/M2
ECHO LA DIAMETER: 4.3 CM
ECHO LA TO AORTIC ROOT RATIO: 1.19
ECHO LA VOL 2C: 42 ML (ref 18–58)
ECHO LA VOL 4C: 60 ML (ref 18–58)
ECHO LA VOL BP: 48 ML (ref 18–58)
ECHO LA VOL/BSA BIPLANE: 25 ML/M2 (ref 16–34)
ECHO LA VOLUME AREA LENGTH: 52 ML
ECHO LA VOLUME INDEX A2C: 22 ML/M2 (ref 16–34)
ECHO LA VOLUME INDEX A4C: 31 ML/M2 (ref 16–34)
ECHO LA VOLUME INDEX AREA LENGTH: 27 ML/M2 (ref 16–34)
ECHO LV E' LATERAL VELOCITY: 6 CM/S
ECHO LV E' SEPTAL VELOCITY: 6 CM/S
ECHO LV EDV A2C: 103 ML
ECHO LV EDV A4C: 100 ML
ECHO LV EDV BP: 103 ML (ref 67–155)
ECHO LV EDV INDEX A4C: 52 ML/M2
ECHO LV EDV INDEX BP: 53 ML/M2
ECHO LV EDV NDEX A2C: 53 ML/M2
ECHO LV EJECTION FRACTION A2C: 58 %
ECHO LV EJECTION FRACTION A4C: 56 %
ECHO LV EJECTION FRACTION BIPLANE: 56 % (ref 55–100)
ECHO LV ESV A2C: 43 ML
ECHO LV ESV A4C: 44 ML
ECHO LV ESV BP: 45 ML (ref 22–58)
ECHO LV ESV INDEX A2C: 22 ML/M2
ECHO LV ESV INDEX A4C: 23 ML/M2
ECHO LV ESV INDEX BP: 23 ML/M2
ECHO LV FRACTIONAL SHORTENING: 32 % (ref 28–44)
ECHO LV INTERNAL DIMENSION DIASTOLE INDEX: 2.44 CM/M2
ECHO LV INTERNAL DIMENSION DIASTOLIC: 4.7 CM (ref 4.2–5.9)
ECHO LV INTERNAL DIMENSION SYSTOLIC INDEX: 1.66 CM/M2
ECHO LV INTERNAL DIMENSION SYSTOLIC: 3.2 CM
ECHO LV IVSD: 1.7 CM (ref 0.6–1)
ECHO LV MASS 2D: 309 G (ref 88–224)
ECHO LV MASS INDEX 2D: 160.1 G/M2 (ref 49–115)
ECHO LV POSTERIOR WALL DIASTOLIC: 1.4 CM (ref 0.6–1)
ECHO LV RELATIVE WALL THICKNESS RATIO: 0.6
ECHO LVOT AREA: 3.5 CM2
ECHO LVOT AV VTI INDEX: 0.76
ECHO LVOT DIAM: 2.1 CM
ECHO LVOT MEAN GRADIENT: 2 MMHG
ECHO LVOT PEAK GRADIENT: 5 MMHG
ECHO LVOT PEAK VELOCITY: 1.1 M/S
ECHO LVOT STROKE VOLUME INDEX: 35.3 ML/M2
ECHO LVOT SV: 68.2 ML
ECHO LVOT VTI: 19.7 CM
ECHO MV A VELOCITY: 0.78 M/S
ECHO MV AREA PHT: 4.1 CM2
ECHO MV E DECELERATION TIME (DT): 183.6 MS
ECHO MV E VELOCITY: 0.68 M/S
ECHO MV E/A RATIO: 0.87
ECHO MV E/E' LATERAL: 11.33
ECHO MV E/E' RATIO (AVERAGED): 11.33
ECHO MV E/E' SEPTAL: 11.33
ECHO MV PRESSURE HALF TIME (PHT): 53.2 MS
ECHO RV INTERNAL DIMENSION: 3.5 CM
ECHO RV TAPSE: 2.3 CM (ref 1.7–?)

## 2023-07-13 ENCOUNTER — OFFICE VISIT (OUTPATIENT)
Age: 63
End: 2023-07-13
Payer: MEDICAID

## 2023-07-13 VITALS
HEIGHT: 66 IN | BODY MASS INDEX: 28.93 KG/M2 | DIASTOLIC BLOOD PRESSURE: 78 MMHG | RESPIRATION RATE: 16 BRPM | WEIGHT: 180 LBS | OXYGEN SATURATION: 100 % | HEART RATE: 81 BPM | SYSTOLIC BLOOD PRESSURE: 130 MMHG

## 2023-07-13 DIAGNOSIS — I25.10 CORONARY ARTERY DISEASE INVOLVING NATIVE CORONARY ARTERY OF NATIVE HEART WITHOUT ANGINA PECTORIS: Primary | ICD-10-CM

## 2023-07-13 DIAGNOSIS — I42.2 ASYMMETRIC SEPTAL HYPERTROPHY (HCC): ICD-10-CM

## 2023-07-13 DIAGNOSIS — E78.00 HYPERCHOLESTEREMIA: ICD-10-CM

## 2023-07-13 DIAGNOSIS — I10 ESSENTIAL HYPERTENSION: ICD-10-CM

## 2023-07-13 DIAGNOSIS — I51.7 VENTRICULAR HYPERTROPHY DETERMINED BY ECHOCARDIOGRAPHY: ICD-10-CM

## 2023-07-13 PROCEDURE — 99214 OFFICE O/P EST MOD 30 MIN: CPT | Performed by: SPECIALIST

## 2023-07-13 PROCEDURE — 93005 ELECTROCARDIOGRAM TRACING: CPT | Performed by: SPECIALIST

## 2023-07-13 RX ORDER — METOPROLOL SUCCINATE 50 MG/1
TABLET, EXTENDED RELEASE ORAL
COMMUNITY
Start: 2023-06-01

## 2023-07-13 ASSESSMENT — PATIENT HEALTH QUESTIONNAIRE - PHQ9
SUM OF ALL RESPONSES TO PHQ QUESTIONS 1-9: 0
1. LITTLE INTEREST OR PLEASURE IN DOING THINGS: 0
SUM OF ALL RESPONSES TO PHQ9 QUESTIONS 1 & 2: 0
SUM OF ALL RESPONSES TO PHQ QUESTIONS 1-9: 0
2. FEELING DOWN, DEPRESSED OR HOPELESS: 0

## 2023-07-13 NOTE — PROGRESS NOTES
Si Spatz     1960       Son Butts MD, [facility]  Date of Visit-2023   PCP is Beronica Garcia MD   [facility] [place]  [facility]  HPI:  Si Spatz is a 61 y.o. male   Last OV started verapamil  Had cath and showed patent LAD stent and then mid 40-50% ,LVEDP 16  Echo with EF 56%  severe eccentric hypertrophy, LVOT mean rest of 12 mm Hg, mild YUDY, wall thickness 1.4-1.7  Today. Kaushal Madden He says he is feeling fairly well. He gets the chest pain 2 or 3 times a week but is now longer exertional.  It is also right-sided. He does not feel short of breath with usual activities. He has not had any palpitations syncope or lower extremity edema. He did not note a change with the verapamil. [id number] sinus rhythm heart rate 81  QTc 402 RSR prime in V1 nonspecific minor T wave flattening. Assessment/Plan:     Patient Instructions   We will see you back in 6 months. Coronary artery disease involving native coronary artery of native heart with unstable angina pectoris [facility])  Previous stent to LAD 2020  Not very tolerant to beta-blocker  Overall doing generally well the chest pain is right-sided and atypical cath showed open stent echo showed eccentric hypertrophy but did not meet criteria for RAMÓN. He does seem to have thickening and a significant murmur. I think at a later date an MRI might be useful to see if this is a hypertrophic cardiomyopathy versus representing asymmetric eccentric hypertrophy from hypertension. At this point the treatment is not changed he is not short of breath at rest his gradient is rather minimal so we will continue with current plans and therapy I will see him back in 6 months and we will plan an MRI probably in 1 year. This will serve as the follow-up instead of the echo.      2. HCM/Asymmetric septal hypertrophy (HCC)  echocardiogram --prior wall thickness 1.8 look for increase in LV outflow tract

## 2023-08-01 RX ORDER — GLIPIZIDE 5 MG/1
TABLET ORAL
Qty: 60 TABLET | Refills: 0 | Status: SHIPPED | OUTPATIENT
Start: 2023-08-01

## 2023-08-01 RX ORDER — TADALAFIL 20 MG/1
TABLET ORAL
Qty: 30 TABLET | OUTPATIENT
Start: 2023-08-01

## 2023-08-01 NOTE — TELEPHONE ENCOUNTER
Spoke to pt and schedule establish care to new pcp with JOSEPH Louis 9/6/23. Pt then stated he needs his medication refilled before then and ask if JOSEPH Louis can fill it? Pt also stated he just had a stent in his heart and need the medication. -TM 8/1/23

## 2023-08-01 NOTE — TELEPHONE ENCOUNTER
PCP: Mariana Ayala MD    Last appt: 1/31/2023   Future Appointments   Date Time Provider [address]   9/6/2023 11:00 AM MATILDE Lucas - NP [facility] BS AMB   1/17/2024 10:00 AM MD CHAYA Galindo BS AMB       Requested Prescriptions     Pending Prescriptions Disp Refills    glipiZIDE (GLUCOTROL) 5 MG tablet [Pharmacy Med Name: glipiZIDE 5 MG TABLET] 60 tablet 0     Sig: TAKE 1 TABLET BY MOUTH TWICE A DAY    tadalafil (CIALIS) 20 MG tablet [Pharmacy Med Name: TADALAFIL 20 MG TABLET] 30 tablet      Sig: TAKE ONE TABLET BY MOUTH AT LEAST 30 MINUTES PRIOR TO ANTICIPATED SEXUAL ACTIVITY AS ONE SINGLE DOSE AND NOT MORE THAN ONCE DAILY         Prior labs and Blood pressures:  BP Readings from Last 3 Encounters:   07/13/23 130/78   05/31/23 113/82   05/15/23 130/82     Lab Results   Component Value Date/Time     05/26/2023 01:42 PM    K 3.3 05/26/2023 01:42 PM     05/26/2023 01:42 PM    CO2 28 05/26/2023 01:42 PM    BUN 8 05/26/2023 01:42 PM    GFRAA >60 07/18/2022 02:24 PM     No results found for: HBA1C, OOJ8VDNW  Lab Results   Component Value Date/Time    CHOL 158 01/31/2023 03:36 PM    HDL 62 01/31/2023 03:36 PM     No results found for: VITD3, VD3RIA    Lab Results   Component Value Date/Time    TSH 0.60 01/31/2023 03:36 PM

## 2023-08-26 NOTE — TELEPHONE ENCOUNTER
Chief Complaint   Patient presents with    Medication Refill         Last office visit was 01/21/2023 Patient was called and advice to call and make appointment with the next available provider.
Last Visit: 1/31/23 MD Cheryle Press  Next Appointment: None- Patient was to f/up in 2 weeks after starting glipizide- no show 2/13/23 Madyson- Contact patient to schedule with new provider-patient has seen Cody Reyna in the past.  Previous Refill Encounter(s): 2/1/23 60 + 1 (last fill 2/28/23)    Requested Prescriptions     Pending Prescriptions Disp Refills    glipiZIDE (GLUCOTROL) 5 mg tablet 60 Tablet 0     Sig: Take 1 Tablet by mouth two (2) times a day.      For Pharmacy Admin Tracking Only    Program: Medication Refill  Intervention Detail: New Rx: 1, reason: Patient Preference  Time Spent (min): 5
Refilled glipizide x30d. Needs to establish with new PCP prior to additional refills. No show to last visit.  Routed to front for srikanth
Yes

## 2023-09-06 ENCOUNTER — OFFICE VISIT (OUTPATIENT)
Age: 63
End: 2023-09-06
Payer: MEDICAID

## 2023-09-06 VITALS
TEMPERATURE: 98 F | RESPIRATION RATE: 16 BRPM | BODY MASS INDEX: 29.51 KG/M2 | HEIGHT: 66 IN | WEIGHT: 183.6 LBS | OXYGEN SATURATION: 97 % | SYSTOLIC BLOOD PRESSURE: 109 MMHG | DIASTOLIC BLOOD PRESSURE: 71 MMHG | HEART RATE: 86 BPM

## 2023-09-06 DIAGNOSIS — N52.9 ERECTILE DYSFUNCTION, UNSPECIFIED ERECTILE DYSFUNCTION TYPE: ICD-10-CM

## 2023-09-06 DIAGNOSIS — Z23 NEEDS FLU SHOT: ICD-10-CM

## 2023-09-06 DIAGNOSIS — E11.65 TYPE 2 DIABETES MELLITUS WITH HYPERGLYCEMIA, WITHOUT LONG-TERM CURRENT USE OF INSULIN (HCC): ICD-10-CM

## 2023-09-06 DIAGNOSIS — Z76.89 ENCOUNTER TO ESTABLISH CARE: Primary | ICD-10-CM

## 2023-09-06 DIAGNOSIS — E87.6 HYPOKALEMIA: ICD-10-CM

## 2023-09-06 DIAGNOSIS — Z12.11 COLON CANCER SCREENING: ICD-10-CM

## 2023-09-06 LAB — HBA1C MFR BLD: 8.1 %

## 2023-09-06 PROCEDURE — 99214 OFFICE O/P EST MOD 30 MIN: CPT | Performed by: NURSE PRACTITIONER

## 2023-09-06 PROCEDURE — PBSHW PR IM ADM PRQ ID SUBQ/IM NJXS EA VACCINE: Performed by: NURSE PRACTITIONER

## 2023-09-06 PROCEDURE — 90674 CCIIV4 VAC NO PRSV 0.5 ML IM: CPT | Performed by: NURSE PRACTITIONER

## 2023-09-06 PROCEDURE — 3046F HEMOGLOBIN A1C LEVEL >9.0%: CPT | Performed by: NURSE PRACTITIONER

## 2023-09-06 PROCEDURE — PBSHW INFLUENZA, FLUCELVAX, (AGE 6 MO+), IM, PF, 0.5 ML: Performed by: NURSE PRACTITIONER

## 2023-09-06 PROCEDURE — 3074F SYST BP LT 130 MM HG: CPT | Performed by: NURSE PRACTITIONER

## 2023-09-06 PROCEDURE — 3078F DIAST BP <80 MM HG: CPT | Performed by: NURSE PRACTITIONER

## 2023-09-06 PROCEDURE — 83036 HEMOGLOBIN GLYCOSYLATED A1C: CPT | Performed by: NURSE PRACTITIONER

## 2023-09-06 PROCEDURE — PBSHW AMB POC HEMOGLOBIN A1C: Performed by: NURSE PRACTITIONER

## 2023-09-06 RX ORDER — TADALAFIL 20 MG/1
TABLET ORAL
Qty: 90 TABLET | Refills: 1 | Status: SHIPPED | OUTPATIENT
Start: 2023-09-06

## 2023-09-06 SDOH — ECONOMIC STABILITY: FOOD INSECURITY: WITHIN THE PAST 12 MONTHS, YOU WORRIED THAT YOUR FOOD WOULD RUN OUT BEFORE YOU GOT MONEY TO BUY MORE.: SOMETIMES TRUE

## 2023-09-06 SDOH — ECONOMIC STABILITY: FOOD INSECURITY: WITHIN THE PAST 12 MONTHS, THE FOOD YOU BOUGHT JUST DIDN'T LAST AND YOU DIDN'T HAVE MONEY TO GET MORE.: NEVER TRUE

## 2023-09-06 SDOH — ECONOMIC STABILITY: HOUSING INSECURITY
IN THE LAST 12 MONTHS, WAS THERE A TIME WHEN YOU DID NOT HAVE A STEADY PLACE TO SLEEP OR SLEPT IN A SHELTER (INCLUDING NOW)?: NO

## 2023-09-06 SDOH — ECONOMIC STABILITY: INCOME INSECURITY: HOW HARD IS IT FOR YOU TO PAY FOR THE VERY BASICS LIKE FOOD, HOUSING, MEDICAL CARE, AND HEATING?: HARD

## 2023-09-06 NOTE — PROGRESS NOTES
[facility] Note     Ed Munson (: 1960) is a 61 y.o. male, established patient, here for evaluation of the following chief complaint(s):  Establish Care and Diabetes       ASSESSMENT/PLAN:  1. Encounter to establish care    2. Type 2 diabetes mellitus with hyperglycemia, without long-term current use of insulin (HCC)  - Not at goal of < 7.0      - AMB POC HEMOGLOBIN A1C  - A1c today = 8.1, previously 10.8 on 23  -      DIABETES FOOT EXAM  -   START  empagliflozin (JARDIANCE) 10 MG tablet; Take 1 tablet by mouth daily, Disp-90 tablet, R-1Normal  -     Comprehensive Metabolic Panel; Future  - On atorvastatin, lisinopril, glipizide and tradjenta  - Declined referral to diabetes educator    3. Erectile dysfunction, unspecified erectile dysfunction type  -     tadalafil (CIALIS) 20 MG tablet; Take 1 tab at least 30 minutes prior to anticipated sexual activity as one single dose and not more than once daily. , Disp-90 tablet, R-1Normal    4. Hypokalemia  -     Comprehensive Metabolic Panel; Future  - Last K 3.3 on 23, will repeat    5. Colon cancer screening  -     Cologuard (Fecal DNA Colorectal Cancer Screening)    6. Needs flu shot  -     DC IM ADM PRQ ID SUBQ/IM NJXS EA VACCINE  -     Influenza, FLUCELVAX, (age 10 mo+), IM, PF, 0.5 mL        Return in about 3 months (around 2023) for diabetes follow up. SUBJECTIVE/OBJECTIVE:    Ed Munson is a 61 y.o. male seen today to establish care with new provider as PCP is no longer with the practice. Cardiovascular Review:  He has diabetes, hypertension, hyperlipidemia, and coronary artery disease. Diet and Lifestyle: not attempting to follow a low fat, low cholesterol diet, not attempting to follow a low sodium diet, exercises sporadically, smoker 5 cigarettes a daily  Home BP Monitoring: is not measured at home.   Pertinent ROS: taking medications as instructed, no medication side effects noted, no

## 2023-09-07 ENCOUNTER — NURSE ONLY (OUTPATIENT)
Age: 63
End: 2023-09-07

## 2023-09-07 DIAGNOSIS — E11.65 TYPE 2 DIABETES MELLITUS WITH HYPERGLYCEMIA, WITHOUT LONG-TERM CURRENT USE OF INSULIN (HCC): ICD-10-CM

## 2023-09-07 DIAGNOSIS — E87.6 HYPOKALEMIA: ICD-10-CM

## 2023-09-07 LAB
ALBUMIN SERPL-MCNC: 3.7 G/DL (ref 3.5–5)
ALBUMIN/GLOB SERPL: 1 (ref 1.1–2.2)
ALP SERPL-CCNC: 93 U/L (ref 45–117)
ALT SERPL-CCNC: 19 U/L (ref 12–78)
ANION GAP SERPL CALC-SCNC: 9 MMOL/L (ref 5–15)
AST SERPL-CCNC: 11 U/L (ref 15–37)
BILIRUB SERPL-MCNC: 0.4 MG/DL (ref 0.2–1)
BUN SERPL-MCNC: 9 MG/DL (ref 6–20)
BUN/CREAT SERPL: 10 (ref 12–20)
CALCIUM SERPL-MCNC: 9.4 MG/DL (ref 8.5–10.1)
CHLORIDE SERPL-SCNC: 107 MMOL/L (ref 97–108)
CO2 SERPL-SCNC: 26 MMOL/L (ref 21–32)
CREAT SERPL-MCNC: 0.91 MG/DL (ref 0.7–1.3)
GLOBULIN SER CALC-MCNC: 3.7 G/DL (ref 2–4)
GLUCOSE SERPL-MCNC: 226 MG/DL (ref 65–100)
POTASSIUM SERPL-SCNC: 3.7 MMOL/L (ref 3.5–5.1)
PROT SERPL-MCNC: 7.4 G/DL (ref 6.4–8.2)
SODIUM SERPL-SCNC: 142 MMOL/L (ref 136–145)

## 2023-10-08 LAB — NONINV COLON CA DNA+OCC BLD SCRN STL QL: NEGATIVE

## 2023-10-10 RX ORDER — GLIPIZIDE 5 MG/1
TABLET ORAL
Qty: 60 TABLET | Refills: 1 | Status: SHIPPED | OUTPATIENT
Start: 2023-10-10

## 2023-10-10 NOTE — TELEPHONE ENCOUNTER
PCP: Shelby Gastelum, APRN - NP    Last appt: 9/6/2023   Future Appointments   Date Time Provider [address]   1/17/2024 10:00 AM Son Butts MD [facility] AMB       Requested Prescriptions     Pending Prescriptions Disp Refills    glipiZIDE (GLUCOTROL) 5 MG tablet [Pharmacy Med Name: glipiZIDE 5 MG TABLET] 60 tablet 0     Sig: TAKE 1 TABLET BY MOUTH TWICE A DAY         Prior labs and Blood pressures:  BP Readings from Last 3 Encounters:   09/06/23 109/71   07/13/23 130/78   05/31/23 113/82     Lab Results   Component Value Date/Time     09/07/2023 09:02 AM    K 3.7 09/07/2023 09:02 AM     09/07/2023 09:02 AM    CO2 26 09/07/2023 09:02 AM    BUN 9 09/07/2023 09:02 AM    GFRAA >60 07/18/2022 02:24 PM     Lab Results   Component Value Date/Time    [id number] 8.1 09/06/2023 11:05 AM     Lab Results   Component Value Date/Time    CHOL 158 01/31/2023 03:36 PM    HDL 62 01/31/2023 03:36 PM     No results found for: \"VITD3\", \"VD3RIA\"    Lab Results   Component Value Date/Time    TSH 0.60 01/31/2023 03:36 PM

## 2023-10-23 RX ORDER — LISINOPRIL 5 MG/1
5 TABLET ORAL DAILY
Qty: 90 TABLET | Refills: 1 | Status: SHIPPED | OUTPATIENT
Start: 2023-10-23

## 2023-10-23 NOTE — TELEPHONE ENCOUNTER
Last appointment: 9/6/23 NP uJancarlos George  Next appointment: None- 3 mo f/up 12/2023  Previous refill encounter(s): 1/31/23 90 + 1 (both)    Requested Prescriptions     Pending Prescriptions Disp Refills    linagliptin (TRADJENTA) 5 MG tablet 90 tablet 1     Sig: Take 1 tablet by mouth daily    lisinopril (PRINIVIL;ZESTRIL) 5 MG tablet 90 tablet 1     Sig: Take 1 tablet by mouth daily     For Pharmacy Admin Tracking Only    Program: Medication Refill  CPA in place:    Recommendation Provided To:    Intervention Detail: New Rx: 2, reason: Patient Preference  Intervention Accepted By:   Edna Belcher Closed?:    Time Spent (min): 5

## 2023-11-24 ENCOUNTER — HOSPITAL ENCOUNTER (EMERGENCY)
Facility: HOSPITAL | Age: 63
Discharge: HOME | End: 2023-11-24
Attending: EMERGENCY MEDICINE
Payer: MEDICAID

## 2023-11-24 ENCOUNTER — APPOINTMENT (OUTPATIENT)
Facility: HOSPITAL | Age: 63
End: 2023-11-24
Payer: MEDICAID

## 2023-11-24 VITALS
RESPIRATION RATE: 16 BRPM | HEART RATE: 80 BPM | HEIGHT: 66 IN | TEMPERATURE: 97.7 F | DIASTOLIC BLOOD PRESSURE: 85 MMHG | WEIGHT: 180 LBS | SYSTOLIC BLOOD PRESSURE: 124 MMHG | BODY MASS INDEX: 28.93 KG/M2 | OXYGEN SATURATION: 100 %

## 2023-11-24 DIAGNOSIS — R07.9 CHEST PAIN, UNSPECIFIED TYPE: Primary | ICD-10-CM

## 2023-11-24 LAB
ANION GAP SERPL CALC-SCNC: 5 MMOL/L (ref 5–15)
BASOPHILS # BLD: 0.1 K/UL (ref 0–0.1)
BASOPHILS NFR BLD: 1 % (ref 0–1)
BUN SERPL-MCNC: 12 MG/DL (ref 6–20)
BUN/CREAT SERPL: 13 (ref 12–20)
CA-I BLD-MCNC: 9.8 MG/DL (ref 8.5–10.1)
CHLORIDE SERPL-SCNC: 110 MMOL/L (ref 97–108)
CO2 SERPL-SCNC: 25 MMOL/L (ref 21–32)
CREAT SERPL-MCNC: 0.89 MG/DL (ref 0.7–1.3)
D DIMER PPP FEU-MCNC: 0.45 UG/ML(FEU)
DIFFERENTIAL METHOD BLD: ABNORMAL
EOSINOPHIL # BLD: 0.1 K/UL (ref 0–0.4)
EOSINOPHIL NFR BLD: 1 % (ref 0–7)
ERYTHROCYTE [DISTWIDTH] IN BLOOD BY AUTOMATED COUNT: 14.8 % (ref 11.5–14.5)
GLUCOSE BLD STRIP.AUTO-MCNC: 159 MG/DL (ref 65–100)
GLUCOSE SERPL-MCNC: 191 MG/DL (ref 65–100)
HCT VFR BLD AUTO: 40.8 % (ref 36.6–50.3)
HGB BLD-MCNC: 14.1 G/DL (ref 12.1–17)
IMM GRANULOCYTES # BLD AUTO: 0 K/UL (ref 0–0.04)
IMM GRANULOCYTES NFR BLD AUTO: 0 % (ref 0–0.5)
LYMPHOCYTES # BLD: 2.9 K/UL (ref 0.8–3.5)
LYMPHOCYTES NFR BLD: 34 % (ref 12–49)
MAGNESIUM SERPL-MCNC: 2.1 MG/DL (ref 1.6–2.4)
MCH RBC QN AUTO: 25.5 PG (ref 26–34)
MCHC RBC AUTO-ENTMCNC: 34.6 G/DL (ref 30–36.5)
MCV RBC AUTO: 73.8 FL (ref 80–99)
MONOCYTES # BLD: 0.7 K/UL (ref 0–1)
MONOCYTES NFR BLD: 8 % (ref 5–13)
NEUTS SEG # BLD: 4.8 K/UL (ref 1.8–8)
NEUTS SEG NFR BLD: 56 % (ref 32–75)
NRBC # BLD: 0 K/UL (ref 0–0.01)
NRBC BLD-RTO: 0 PER 100 WBC
PERFORMED BY:: ABNORMAL
PLATELET # BLD AUTO: 304 K/UL (ref 150–400)
PMV BLD AUTO: 10 FL (ref 8.9–12.9)
POTASSIUM SERPL-SCNC: 3.6 MMOL/L (ref 3.5–5.1)
RBC # BLD AUTO: 5.53 M/UL (ref 4.1–5.7)
SODIUM SERPL-SCNC: 140 MMOL/L (ref 136–145)
TROPONIN I SERPL HS-MCNC: 10 NG/L (ref 0–76)
TROPONIN I SERPL HS-MCNC: 11 NG/L (ref 0–76)
WBC # BLD AUTO: 8.5 K/UL (ref 4.1–11.1)

## 2023-11-24 PROCEDURE — 71045 X-RAY EXAM CHEST 1 VIEW: CPT

## 2023-11-24 PROCEDURE — 85379 FIBRIN DEGRADATION QUANT: CPT

## 2023-11-24 PROCEDURE — 83735 ASSAY OF MAGNESIUM: CPT

## 2023-11-24 PROCEDURE — 99285 EMERGENCY DEPT VISIT HI MDM: CPT

## 2023-11-24 PROCEDURE — 36415 COLL VENOUS BLD VENIPUNCTURE: CPT

## 2023-11-24 PROCEDURE — 80048 BASIC METABOLIC PNL TOTAL CA: CPT

## 2023-11-24 PROCEDURE — 6370000000 HC RX 637 (ALT 250 FOR IP): Performed by: EMERGENCY MEDICINE

## 2023-11-24 PROCEDURE — 82962 GLUCOSE BLOOD TEST: CPT

## 2023-11-24 PROCEDURE — 85025 COMPLETE CBC W/AUTO DIFF WBC: CPT

## 2023-11-24 PROCEDURE — 84484 ASSAY OF TROPONIN QUANT: CPT

## 2023-11-24 PROCEDURE — 93005 ELECTROCARDIOGRAM TRACING: CPT | Performed by: EMERGENCY MEDICINE

## 2023-11-24 RX ORDER — ASPIRIN 325 MG
325 TABLET ORAL
Status: COMPLETED | OUTPATIENT
Start: 2023-11-24 | End: 2023-11-24

## 2023-11-24 RX ADMIN — ASPIRIN 325 MG: 325 TABLET ORAL at 11:21

## 2023-11-24 ASSESSMENT — LIFESTYLE VARIABLES
HOW MANY STANDARD DRINKS CONTAINING ALCOHOL DO YOU HAVE ON A TYPICAL DAY: PATIENT DOES NOT DRINK
HOW OFTEN DO YOU HAVE A DRINK CONTAINING ALCOHOL: NEVER

## 2023-11-24 ASSESSMENT — PAIN - FUNCTIONAL ASSESSMENT: PAIN_FUNCTIONAL_ASSESSMENT: 0-10

## 2023-11-24 ASSESSMENT — PAIN SCALES - GENERAL
PAINLEVEL_OUTOF10: 6
PAINLEVEL_OUTOF10: 0

## 2023-11-24 NOTE — ED PROVIDER NOTES
[facility] EMERGENCY DEPT  EMERGENCY DEPARTMENT HISTORY AND PHYSICAL EXAM      Date: 11/24/2023  Patient Name: Kirsten Jean  MRN: [id number]  [address] [facility] 1960  Date of evaluation: 11/24/2023  Provider: Roxy Flores MD   Note Started: 12:58 PM EST 11/24/23    HISTORY OF PRESENT ILLNESS     Chief Complaint   Patient presents with    Chest Pain    Numbness       History Provided By: Patient    HPI: Kirsten Jean is a 61 y.o. male patient with a history of NSTEMI cardiac stents diabetes hypertension presents with chest pressure pain with radiation down the left arm. Nothing involving the left leg. Describes the pain as a numbing pain in the arm. Actively having mild discomfort. 6 out of 10.     PAST MEDICAL HISTORY   Past Medical History:  Past Medical History:   Diagnosis Date    Asymmetric septal hypertrophy ([address]) 9/8/2020    Coronary artery disease involving native coronary artery of native heart without angina pectoris 8/4/2020    NSTEMI June 2020 in [place] with stent to LAD    Gastroesophageal reflux disease 4/9/2020    Hypertension     Hypothyroidism     AMOS (obstructive sleep apnea) 4/9/2020    Tobacco abuse 11/24/2018    Type 2 diabetes mellitus with hyperglycemia, without long-term current use of insulin ([address]) 5/13/2020       Past Surgical History:  Past Surgical History:   Procedure Laterality Date    CARDIAC PROCEDURE N/A 5/31/2023    Left heart cath / coronary angiography performed by Marcelene Soulier, MD at [facility] CATH LAB    CARDIAC PROCEDURE N/A 5/31/2023    Fractional flow reserve (FFR) performed by Marcelene Soulier, MD at [facility] LAB       Family History:  Family History   Problem Relation Age of Onset    Hypertension Maternal Grandfather     Diabetes Maternal Grandfather     Hypertension Sister     Diabetes Sister     Arthritis Sister     Hypertension Mother     Diabetes Mother        Social History:  Social History     Tobacco Use    Smoking status: Every Day     Packs/day:

## 2023-11-24 NOTE — DISCHARGE INSTRUCTIONS
11/24/23 10:49 AM   Result Value Ref Range    Troponin, High Sensitivity 10 0 - 76 ng/L   D-Dimer, Quantitative    Collection Time: 11/24/23 10:49 AM   Result Value Ref Range    D-Dimer, Quant 0.45 <0.50 ug/ml(FEU)   Magnesium    Collection Time: 11/24/23 10:49 AM   Result Value Ref Range    Magnesium 2.1 1.6 - 2.4 mg/dL   POCT Glucose    Collection Time: 11/24/23 10:52 AM   Result Value Ref Range    POC Glucose 159 (H) 65 - 100 mg/dL    Performed by: Amandeep Alcaraz    Troponin    Collection Time: 11/24/23 12:19 PM   Result Value Ref Range    Troponin, High Sensitivity 11 0 - 76 ng/L       Radiologic Studies  XR CHEST PORTABLE   Final Result   1. Pulmonary vascular congestion without overt pulmonary edema. Mild   cardiomegaly. ------------------------------------------------------------------------------------------------------------  The exam and treatment you received in the Emergency Department were for an urgent problem and are not intended as complete care. It is important that you follow-up with a doctor, nurse practitioner, or physician assistant to:  (1) confirm your diagnosis,  (2) re-evaluation of changes in your illness and treatment, and  (3) for ongoing care. Please take your discharge instructions with you when you go to your follow-up appointment. If you have any problem arranging a follow-up appointment, contact the Emergency Department. If your symptoms become worse or you do not improve as expected and you are unable to reach your health care provider, please return to the Emergency Department. We are available 24 hours a day. If a prescription has been provided, please have it filled as soon as possible to prevent a delay in treatment. If you have any questions or reservations about taking the medication due to side effects or interactions with other medications, please call your primary care provider or contact the ER.

## 2023-11-25 LAB
EKG ATRIAL RATE: 98 BPM
EKG DIAGNOSIS: NORMAL
EKG P AXIS: 54 DEGREES
EKG P-R INTERVAL: 190 MS
EKG Q-T INTERVAL: 348 MS
EKG QRS DURATION: 106 MS
EKG QTC CALCULATION (BAZETT): 444 MS
EKG R AXIS: -23 DEGREES
EKG T AXIS: 53 DEGREES
EKG VENTRICULAR RATE: 98 BPM

## 2024-01-10 DIAGNOSIS — N52.9 ERECTILE DYSFUNCTION, UNSPECIFIED ERECTILE DYSFUNCTION TYPE: ICD-10-CM

## 2024-01-11 RX ORDER — TADALAFIL 20 MG/1
TABLET ORAL
Qty: 90 TABLET | Refills: 1 | Status: SHIPPED | OUTPATIENT
Start: 2024-01-11

## 2024-01-11 NOTE — TELEPHONE ENCOUNTER
PCP: Latanya Louis, APRN - NP    Last appt: 9/6/2023   Future Appointments   Date Time Provider Department Center   1/17/2024 10:00 AM Son Butts MD [facility] AMB       Requested Prescriptions     Pending Prescriptions Disp Refills    tadalafil (CIALIS) 20 MG tablet [Pharmacy Med Name: TADALAFIL 20 MG TABLET] 90 tablet 1     Sig: TAKE 1 TABLET BY MOUTH AT LEAST 30 MINUTES PRIOR TO ANTICIPATED SEXUAL ACTIVITY AS ONE SINGLE DOSE AND NOT MORE THAN ONCE DAILY         Prior labs and Blood pressures:  BP Readings from Last 3 Encounters:   11/24/23 124/85   09/06/23 109/71   07/13/23 130/78     Lab Results   Component Value Date/Time     11/24/2023 10:49 AM    K 3.6 11/24/2023 10:49 AM     11/24/2023 10:49 AM    CO2 25 11/24/2023 10:49 AM    BUN 12 11/24/2023 10:49 AM    GFRAA >60 07/18/2022 02:24 PM     Lab Results   Component Value Date/Time    [id number] 8.1 09/06/2023 11:05 AM     Lab Results   Component Value Date/Time    CHOL 158 01/31/2023 03:36 PM    HDL 62 01/31/2023 03:36 PM     No results found for: \"VITD3\", \"VD3RIA\"    Lab Results   Component Value Date/Time    TSH 0.60 01/31/2023 03:36 PM

## 2024-02-02 RX ORDER — ATORVASTATIN CALCIUM 80 MG/1
80 TABLET, FILM COATED ORAL DAILY
Qty: 30 TABLET | Refills: 0 | Status: SHIPPED | OUTPATIENT
Start: 2024-02-02

## 2024-02-21 NOTE — TELEPHONE ENCOUNTER
PCP: Latanya Louis APRN - NP    Last appt: 9/6/2023   Future Appointments   Date Time Provider Department Center   2/26/2024 10:45 AM Latanya Louis APRN - NP [facility] AMB       Requested Prescriptions     Pending Prescriptions Disp Refills    glipiZIDE (GLUCOTROL) 5 MG tablet [Pharmacy Med Name: glipiZIDE 5 MG TABLET] 60 tablet 1     Sig: TAKE 1 TABLET BY MOUTH TWICE A DAY         Prior labs and Blood pressures:  BP Readings from Last 3 Encounters:   11/24/23 124/85   09/06/23 109/71   07/13/23 130/78     Lab Results   Component Value Date/Time     11/24/2023 10:49 AM    K 3.6 11/24/2023 10:49 AM     11/24/2023 10:49 AM    CO2 25 11/24/2023 10:49 AM    BUN 12 11/24/2023 10:49 AM    GFRAA >60 07/18/2022 02:24 PM     Lab Results   Component Value Date/Time    YAD9HNDU 8.1 09/06/2023 11:05 AM     Lab Results   Component Value Date/Time    CHOL 158 01/31/2023 03:36 PM    HDL 62 01/31/2023 03:36 PM     No results found for: \"VITD3\", \"VD3RIA\"    Lab Results   Component Value Date/Time    TSH 0.60 01/31/2023 03:36 PM

## 2024-02-22 ENCOUNTER — TELEPHONE (OUTPATIENT)
Age: 64
End: 2024-02-22

## 2024-02-22 NOTE — TELEPHONE ENCOUNTER
Jacinta from ultimate behalf of [facility] was calling to check on status of fax sent to us for medical records.   She had the wrong fax and I gave her the correct one and she will be calling back in 2 weeks to check status.

## 2024-02-23 RX ORDER — GLIPIZIDE 5 MG/1
TABLET ORAL
Qty: 60 TABLET | Refills: 0 | Status: SHIPPED | OUTPATIENT
Start: 2024-02-23

## 2024-02-26 ENCOUNTER — OFFICE VISIT (OUTPATIENT)
Age: 64
End: 2024-02-26
Payer: MEDICAID

## 2024-02-26 VITALS
HEIGHT: 66 IN | OXYGEN SATURATION: 97 % | TEMPERATURE: 96.9 F | DIASTOLIC BLOOD PRESSURE: 69 MMHG | WEIGHT: 183.4 LBS | RESPIRATION RATE: 16 BRPM | SYSTOLIC BLOOD PRESSURE: 104 MMHG | BODY MASS INDEX: 29.47 KG/M2 | HEART RATE: 93 BPM

## 2024-02-26 DIAGNOSIS — E78.00 HYPERCHOLESTEREMIA: ICD-10-CM

## 2024-02-26 DIAGNOSIS — E11.65 TYPE 2 DIABETES MELLITUS WITH HYPERGLYCEMIA, WITHOUT LONG-TERM CURRENT USE OF INSULIN (HCC): Primary | ICD-10-CM

## 2024-02-26 DIAGNOSIS — I25.10 CORONARY ARTERY DISEASE INVOLVING NATIVE CORONARY ARTERY OF NATIVE HEART WITHOUT ANGINA PECTORIS: ICD-10-CM

## 2024-02-26 DIAGNOSIS — I10 ESSENTIAL HYPERTENSION: ICD-10-CM

## 2024-02-26 DIAGNOSIS — N52.9 ERECTILE DYSFUNCTION, UNSPECIFIED ERECTILE DYSFUNCTION TYPE: ICD-10-CM

## 2024-02-26 LAB — HBA1C MFR BLD: 7.9 %

## 2024-02-26 PROCEDURE — 3078F DIAST BP <80 MM HG: CPT | Performed by: NURSE PRACTITIONER

## 2024-02-26 PROCEDURE — PBSHW AMB POC HEMOGLOBIN A1C: Performed by: NURSE PRACTITIONER

## 2024-02-26 PROCEDURE — 99214 OFFICE O/P EST MOD 30 MIN: CPT | Performed by: NURSE PRACTITIONER

## 2024-02-26 PROCEDURE — 83036 HEMOGLOBIN GLYCOSYLATED A1C: CPT | Performed by: NURSE PRACTITIONER

## 2024-02-26 PROCEDURE — 3074F SYST BP LT 130 MM HG: CPT | Performed by: NURSE PRACTITIONER

## 2024-02-26 RX ORDER — LISINOPRIL 5 MG/1
5 TABLET ORAL DAILY
Qty: 90 TABLET | Refills: 1 | Status: SHIPPED | OUTPATIENT
Start: 2024-02-26

## 2024-02-26 ASSESSMENT — PATIENT HEALTH QUESTIONNAIRE - PHQ9
2. FEELING DOWN, DEPRESSED OR HOPELESS: 0
SUM OF ALL RESPONSES TO PHQ QUESTIONS 1-9: 0
SUM OF ALL RESPONSES TO PHQ QUESTIONS 1-9: 0
SUM OF ALL RESPONSES TO PHQ9 QUESTIONS 1 & 2: 0
1. LITTLE INTEREST OR PLEASURE IN DOING THINGS: 0
SUM OF ALL RESPONSES TO PHQ QUESTIONS 1-9: 0
SUM OF ALL RESPONSES TO PHQ QUESTIONS 1-9: 0

## 2024-02-26 NOTE — PROGRESS NOTES
[facility]  Clinic Note     Pelon Prather (: 1960) is a 64 y.o. male, established patient, here for evaluation of the following chief complaint(s):  Diabetes       ASSESSMENT/PLAN:  1. Type 2 diabetes mellitus with hyperglycemia, without long-term current use of insulin (HCC)  -Not at goal  - A1c today 7.9  which is down from 8.1 on 23  - Has upcoming eye exam in the next 1-2 weeks  -     AMB POC HEMOGLOBIN A1C  -     Comprehensive Metabolic Panel; Future  -     Lipid Panel; Future  -  INCREASE   empagliflozin (JARDIANCE) 25 MG tablet; Take 1 tablet by mouth daily, Disp-90 tablet, R-1Normal  -     linagliptin (TRADJENTA) 5 MG tablet; Take 1 tablet by mouth daily, Disp-90 tablet, R-1Normal  -     lisinopril (PRINIVIL;ZESTRIL) 5 MG tablet; Take 1 tablet by mouth daily, Disp-90 tablet, R-1Normal    2. Essential hypertension  - Controlled.   -     Comprehensive Metabolic Panel; Future  -     lisinopril (PRINIVIL;ZESTRIL) 5 MG tablet; Take 1 tablet by mouth daily, Disp-90 tablet, R-1Normal    3. Hypercholesteremia  -     Lipid Panel; Future  -On atorvastatin 80 mg daily.    4. Erectile dysfunction, unspecified erectile dysfunction type  -Cialis reported to be ineffective.  -     AFL - Jaya Lee MD, Urology, [place] ([place] )    5. Coronary artery disease involving native coronary artery of native heart without angina pectoris  - NSTEMI w/  LAD stenting 2020  -Intolerant to beta-blockers.  - Followed by cardiology, Dr. Butts        Return in about 3 months (around 2024) for diabetes follow up, hypertension / blood pressure.        SUBJECTIVE/OBJECTIVE:    Pelon Prather is a 64 y.o. male seen today for DM, HTN, CAD, ED    Cardiovascular Review:  He has diabetes, hypertension, hyperlipidemia, coronary artery disease, and status post coronary artery stenting.  Diet and Lifestyle: does not rigorously follow a diabetic diet  Home BP Monitoring: is not measured at

## 2024-02-26 NOTE — PROGRESS NOTES
Chief Complaint   Patient presents with    Diabetes       \"Have you been to the ER, urgent care clinic since your last visit?  Hospitalized since your last visit?\"    NO    “Have you seen or consulted any other health care providers outside of [facility] since your last visit?”    NO            2/26/2024    10:51 AM   PHQ-9    Little interest or pleasure in doing things 0   Feeling down, depressed, or hopeless 0   PHQ-2 Score 0   PHQ-9 Total Score 0       Health Maintenance Due   Topic Date Due    Pneumococcal 0-64 years Vaccine (1 - PCV) Never done    HIV screen  Never done    Diabetic retinal exam  Never done    Shingles vaccine (1 of 2) Never done    Respiratory Syncytial Virus (RSV) Pregnant or age 60 yrs+ (1 - 1-dose 60+ series) Never done    COVID-19 Vaccine (3 - 2023-24 season) 09/01/2023    Diabetic Alb to Cr ratio (uACR) test  01/31/2024    Lipids  01/31/2024

## 2024-02-27 ENCOUNTER — NURSE ONLY (OUTPATIENT)
Age: 64
End: 2024-02-27

## 2024-02-27 DIAGNOSIS — E78.00 HYPERCHOLESTEREMIA: ICD-10-CM

## 2024-02-27 DIAGNOSIS — I10 ESSENTIAL HYPERTENSION: ICD-10-CM

## 2024-02-27 DIAGNOSIS — E11.65 TYPE 2 DIABETES MELLITUS WITH HYPERGLYCEMIA, WITHOUT LONG-TERM CURRENT USE OF INSULIN (HCC): ICD-10-CM

## 2024-02-27 LAB
ALBUMIN SERPL-MCNC: 3.8 G/DL (ref 3.5–5)
ALBUMIN/GLOB SERPL: 1 (ref 1.1–2.2)
ALP SERPL-CCNC: 90 U/L (ref 45–117)
ANION GAP SERPL CALC-SCNC: 4 MMOL/L (ref 5–15)
AST SERPL-CCNC: 18 U/L (ref 15–37)
BILIRUB SERPL-MCNC: 0.7 MG/DL (ref 0.2–1)
BUN SERPL-MCNC: 9 MG/DL (ref 6–20)
BUN/CREAT SERPL: 11 (ref 12–20)
CALCIUM SERPL-MCNC: 9.4 MG/DL (ref 8.5–10.1)
CHLORIDE SERPL-SCNC: 109 MMOL/L (ref 97–108)
CHOLEST SERPL-MCNC: 227 MG/DL
CO2 SERPL-SCNC: 27 MMOL/L (ref 21–32)
CREAT SERPL-MCNC: 0.8 MG/DL (ref 0.7–1.3)
GLOBULIN SER CALC-MCNC: 3.8 G/DL (ref 2–4)
GLUCOSE SERPL-MCNC: 129 MG/DL (ref 65–100)
HDLC SERPL-MCNC: 60 MG/DL
HDLC SERPL: 3.8 (ref 0–5)
LDLC SERPL CALC-MCNC: 144.8 MG/DL (ref 0–100)
POTASSIUM SERPL-SCNC: 3.6 MMOL/L (ref 3.5–5.1)
PROT SERPL-MCNC: 7.6 G/DL (ref 6.4–8.2)
SODIUM SERPL-SCNC: 140 MMOL/L (ref 136–145)
TRIGL SERPL-MCNC: 111 MG/DL
VLDLC SERPL CALC-MCNC: 22.2 MG/DL

## 2024-02-27 RX ORDER — ATORVASTATIN CALCIUM 80 MG/1
80 TABLET, FILM COATED ORAL DAILY
Qty: 30 TABLET | Refills: 0 | OUTPATIENT
Start: 2024-02-27

## 2024-02-27 RX ORDER — EMPAGLIFLOZIN 10 MG/1
10 TABLET, FILM COATED ORAL DAILY
Qty: 90 TABLET | Refills: 1 | OUTPATIENT
Start: 2024-02-27

## 2024-03-26 DIAGNOSIS — I25.110 CORONARY ARTERY DISEASE INVOLVING NATIVE CORONARY ARTERY OF NATIVE HEART WITH UNSTABLE ANGINA PECTORIS (HCC): ICD-10-CM

## 2024-03-26 DIAGNOSIS — I10 ESSENTIAL HYPERTENSION: ICD-10-CM

## 2024-03-26 RX ORDER — GLIPIZIDE 5 MG/1
TABLET ORAL
Qty: 60 TABLET | Refills: 0 | Status: SHIPPED | OUTPATIENT
Start: 2024-03-26

## 2024-03-26 NOTE — TELEPHONE ENCOUNTER
Per VO by MD.    Future Appointments   Date Time Provider Department Center   5/29/2024 10:45 AM Latanya Louis, APRN - NP [facility] BS AMB

## 2024-03-26 NOTE — TELEPHONE ENCOUNTER
PCP: Latanya Louis APRN - NP    Last appt: 2/26/2024   Future Appointments   Date Time Provider Department Center   5/29/2024 10:45 AM Latanya Louis APRN - NP [facility] BS AMB       Requested Prescriptions     Pending Prescriptions Disp Refills    glipiZIDE (GLUCOTROL) 5 MG tablet [Pharmacy Med Name: glipiZIDE 5 MG TABLET] 60 tablet 0     Sig: TAKE 1 TABLET BY MOUTH TWICE A DAY         Prior labs and Blood pressures:  BP Readings from Last 3 Encounters:   02/26/24 104/69   11/24/23 124/85   09/06/23 109/71     Lab Results   Component Value Date/Time     02/27/2024 08:48 AM    K 3.6 02/27/2024 08:48 AM     02/27/2024 08:48 AM    CO2 27 02/27/2024 08:48 AM    BUN 9 02/27/2024 08:48 AM    GFRAA >60 07/18/2022 02:24 PM     Lab Results   Component Value Date/Time    [id number] 7.9 02/26/2024 10:52 AM     Lab Results   Component Value Date/Time    CHOL 227 02/27/2024 08:48 AM    HDL 60 02/27/2024 08:48 AM     No results found for: \"VITD3\", \"VD3RIA\"    Lab Results   Component Value Date/Time    TSH 0.60 01/31/2023 03:36 PM

## 2024-04-28 DIAGNOSIS — E11.65 TYPE 2 DIABETES MELLITUS WITH HYPERGLYCEMIA, WITHOUT LONG-TERM CURRENT USE OF INSULIN (HCC): ICD-10-CM

## 2024-04-29 RX ORDER — ATORVASTATIN CALCIUM 80 MG/1
80 TABLET, FILM COATED ORAL DAILY
Qty: 30 TABLET | Refills: 0 | Status: SHIPPED | OUTPATIENT
Start: 2024-04-29

## 2024-04-29 RX ORDER — EMPAGLIFLOZIN 10 MG/1
10 TABLET, FILM COATED ORAL DAILY
Qty: 90 TABLET | Refills: 1 | OUTPATIENT
Start: 2024-04-29

## 2024-04-29 NOTE — TELEPHONE ENCOUNTER
PCP: Latanya Louis APRN - NP    Last appt: 2/26/2024   Future Appointments   Date Time Provider Department Center   5/29/2024 10:45 AM Latanya Louis APRN - NP [facility] BS AMB       Requested Prescriptions     Pending Prescriptions Disp Refills    atorvastatin (LIPITOR) 80 MG tablet [Pharmacy Med Name: ATORVASTATIN 80 MG TABLET] 30 tablet 0     Sig: TAKE 1 TABLET BY MOUTH DAILY    JARDIANCE 10 MG tablet [Pharmacy Med Name: JARDIANCE 10 MG TABLET] 90 tablet 1     Sig: TAKE 1 TABLET BY MOUTH DAILY         Prior labs and Blood pressures:  BP Readings from Last 3 Encounters:   02/26/24 104/69   11/24/23 124/85   09/06/23 109/71     Lab Results   Component Value Date/Time     02/27/2024 08:48 AM    K 3.6 02/27/2024 08:48 AM     02/27/2024 08:48 AM    CO2 27 02/27/2024 08:48 AM    BUN 9 02/27/2024 08:48 AM    GFRAA >60 07/18/2022 02:24 PM     Lab Results   Component Value Date/Time    [id number] 7.9 02/26/2024 10:52 AM     Lab Results   Component Value Date/Time    CHOL 227 02/27/2024 08:48 AM    HDL 60 02/27/2024 08:48 AM     No results found for: \"VITD3\", \"VD3RIA\"    Lab Results   Component Value Date/Time    TSH 0.60 01/31/2023 03:36 PM

## 2024-08-02 DIAGNOSIS — I25.110 CORONARY ARTERY DISEASE INVOLVING NATIVE CORONARY ARTERY OF NATIVE HEART WITH UNSTABLE ANGINA PECTORIS (HCC): ICD-10-CM

## 2024-08-02 DIAGNOSIS — N52.9 ERECTILE DYSFUNCTION, UNSPECIFIED ERECTILE DYSFUNCTION TYPE: ICD-10-CM

## 2024-08-02 DIAGNOSIS — I10 ESSENTIAL HYPERTENSION: ICD-10-CM

## 2024-08-02 DIAGNOSIS — E11.65 TYPE 2 DIABETES MELLITUS WITH HYPERGLYCEMIA, WITHOUT LONG-TERM CURRENT USE OF INSULIN (HCC): ICD-10-CM

## 2024-08-02 RX ORDER — GLIPIZIDE 5 MG/1
5 TABLET ORAL 2 TIMES DAILY
Qty: 60 TABLET | Refills: 0 | Status: SHIPPED | OUTPATIENT
Start: 2024-08-02

## 2024-08-02 NOTE — TELEPHONE ENCOUNTER
PCP: Latanya Louis, APRN - NP    Last appt: 2/26/2024   No future appointments.    Requested Prescriptions     Pending Prescriptions Disp Refills    glipiZIDE (GLUCOTROL) 5 MG tablet 60 tablet 0     Sig: Take 1 tablet by mouth 2 times daily         Prior labs and Blood pressures:  BP Readings from Last 3 Encounters:   02/26/24 104/69   11/24/23 124/85   09/06/23 109/71     Lab Results   Component Value Date/Time     02/27/2024 08:48 AM    K 3.6 02/27/2024 08:48 AM     02/27/2024 08:48 AM    CO2 27 02/27/2024 08:48 AM    BUN 9 02/27/2024 08:48 AM    GFRAA >60 07/18/2022 02:24 PM     Lab Results   Component Value Date/Time    [id number] 7.9 02/26/2024 10:52 AM     Lab Results   Component Value Date/Time    CHOL 227 02/27/2024 08:48 AM    HDL 60 02/27/2024 08:48 AM    .8 02/27/2024 08:48 AM    VLDL 22.2 02/27/2024 08:48 AM     No results found for: \"VITD3\"    Lab Results   Component Value Date/Time    TSH 0.60 01/31/2023 03:36 PM

## 2024-08-05 DIAGNOSIS — E11.65 TYPE 2 DIABETES MELLITUS WITH HYPERGLYCEMIA, WITHOUT LONG-TERM CURRENT USE OF INSULIN (HCC): ICD-10-CM

## 2024-08-05 DIAGNOSIS — I10 ESSENTIAL HYPERTENSION: ICD-10-CM

## 2024-08-05 DIAGNOSIS — N52.9 ERECTILE DYSFUNCTION, UNSPECIFIED ERECTILE DYSFUNCTION TYPE: ICD-10-CM

## 2024-08-05 RX ORDER — LISINOPRIL 5 MG/1
5 TABLET ORAL DAILY
Qty: 90 TABLET | Refills: 1 | OUTPATIENT
Start: 2024-08-05

## 2024-08-05 RX ORDER — LISINOPRIL 5 MG/1
5 TABLET ORAL DAILY
Qty: 30 TABLET | Refills: 0 | Status: SHIPPED | OUTPATIENT
Start: 2024-08-05

## 2024-08-05 RX ORDER — VERAPAMIL HYDROCHLORIDE 120 MG/1
120 TABLET, FILM COATED, EXTENDED RELEASE ORAL DAILY
Qty: 90 TABLET | Refills: 3 | OUTPATIENT
Start: 2024-08-05

## 2024-08-05 RX ORDER — GLIPIZIDE 5 MG/1
5 TABLET ORAL 2 TIMES DAILY
Qty: 60 TABLET | Refills: 0 | OUTPATIENT
Start: 2024-08-05

## 2024-08-05 RX ORDER — TADALAFIL 20 MG/1
TABLET ORAL
Qty: 30 TABLET | Refills: 0 | Status: SHIPPED | OUTPATIENT
Start: 2024-08-05

## 2024-08-05 RX ORDER — TADALAFIL 20 MG/1
TABLET ORAL
Qty: 90 TABLET | Refills: 1 | OUTPATIENT
Start: 2024-08-05

## 2024-08-05 NOTE — TELEPHONE ENCOUNTER
PCP: Latanya Louis, APRN - NP    Last appt: 2/26/2024   No future appointments.    Requested Prescriptions     Pending Prescriptions Disp Refills    tadalafil (CIALIS) 20 MG tablet 90 tablet 1     Sig: TAKE 1 TABLET BY MOUTH AT LEAST 30 MINUTES PRIOR TO ANTICIPATED SEXUAL ACTIVITY AS ONE SINGLE DOSE AND NOT MORE THAN ONCE DAILY    lisinopril (PRINIVIL;ZESTRIL) 5 MG tablet 90 tablet 1     Sig: Take 1 tablet by mouth daily         Prior labs and Blood pressures:  BP Readings from Last 3 Encounters:   02/26/24 104/69   11/24/23 124/85   09/06/23 109/71     Lab Results   Component Value Date/Time     02/27/2024 08:48 AM    K 3.6 02/27/2024 08:48 AM     02/27/2024 08:48 AM    CO2 27 02/27/2024 08:48 AM    BUN 9 02/27/2024 08:48 AM    GFRAA >60 07/18/2022 02:24 PM     Lab Results   Component Value Date/Time    [id number] 7.9 02/26/2024 10:52 AM     Lab Results   Component Value Date/Time    CHOL 227 02/27/2024 08:48 AM    HDL 60 02/27/2024 08:48 AM    .8 02/27/2024 08:48 AM    VLDL 22.2 02/27/2024 08:48 AM     No results found for: \"VITD3\"    Lab Results   Component Value Date/Time    TSH 0.60 01/31/2023 03:36 PM

## 2024-08-05 NOTE — TELEPHONE ENCOUNTER
Informed pt that JOSEPH Louis approved his Cialis as well as his Lisinopril and that she need's to make an In Office Appt.Pt went onto say that he currently has no Insurance or that his Insurance is being reviewed so he cannot make it in at this time.And without him having Insurance Nick is to expensive and would like the medication's transferred to the [facility] on [facility].

## 2024-11-18 ENCOUNTER — TELEPHONE (OUTPATIENT)
Age: 64
End: 2024-11-18

## 2024-11-18 DIAGNOSIS — I10 ESSENTIAL HYPERTENSION: Primary | ICD-10-CM

## 2024-11-18 DIAGNOSIS — I51.7 VENTRICULAR HYPERTROPHY DETERMINED BY ECHOCARDIOGRAPHY: ICD-10-CM

## 2024-11-18 DIAGNOSIS — I51.7 ASYMMETRIC SEPTAL HYPERTROPHY: ICD-10-CM

## 2024-11-18 NOTE — TELEPHONE ENCOUNTER
Attempted to reach patient by telephone. A message was left for return call.     Mailed note and follow ups    Future Appointments   Date Time Provider Department Center   2/18/2025 10:00 AM FLETCHER STONE ECHO 1 [facility]   3/4/2025  1:00 PM Ann-Marie Dillon APRN - NP [facility]     ----- Message from Dr. Son Butts MD sent at 11/16/2024  3:41 PM EST -----  Echo from last year, pt with refills but no follow up in chart, missed 6 months in Jan 2024  Lets get an echo  after jan 2025-and have him see NP 2 weeks after the echo, no rush  We need to look at this eccentric CMY and gradient   thanks

## 2025-02-05 ENCOUNTER — TELEPHONE (OUTPATIENT)
Age: 65
End: 2025-02-05

## 2025-02-05 NOTE — TELEPHONE ENCOUNTER
Tried calling the number on file to get updated insurance information, but the number was disconnected.. No other number on file    Thanks~

## 2025-02-06 ENCOUNTER — CLINICAL DOCUMENTATION (OUTPATIENT)
Age: 65
End: 2025-02-06

## 2025-02-06 NOTE — PROGRESS NOTES
Called and s/w pt. 2 patient identifiers verified. [facility] policy on file showed termed 2/29/24. Patient states he's on Medicare A&B now, but did not have his card available to provide the member ID. Patient will bring his insurance card to his echo appt on 2/18 so it can be added to the system.

## 2025-02-18 ENCOUNTER — ANCILLARY PROCEDURE (OUTPATIENT)
Age: 65
End: 2025-02-18
Payer: MEDICARE

## 2025-02-18 VITALS
BODY MASS INDEX: 29.41 KG/M2 | SYSTOLIC BLOOD PRESSURE: 160 MMHG | HEART RATE: 93 BPM | DIASTOLIC BLOOD PRESSURE: 85 MMHG | HEIGHT: 66 IN | WEIGHT: 183 LBS

## 2025-02-18 DIAGNOSIS — I10 ESSENTIAL HYPERTENSION: ICD-10-CM

## 2025-02-18 DIAGNOSIS — I51.7 VENTRICULAR HYPERTROPHY DETERMINED BY ECHOCARDIOGRAPHY: ICD-10-CM

## 2025-02-18 DIAGNOSIS — I51.7 ASYMMETRIC SEPTAL HYPERTROPHY: ICD-10-CM

## 2025-02-18 PROCEDURE — 93306 TTE W/DOPPLER COMPLETE: CPT | Performed by: SPECIALIST

## 2025-02-20 LAB
ECHO AO ASC DIAM: 3.6 CM
ECHO AO ASCENDING AORTA INDEX: 1.87 CM/M2
ECHO AO ROOT DIAM: 3.4 CM
ECHO AO ROOT INDEX: 1.76 CM/M2
ECHO AV AREA PEAK VELOCITY: 2.1 CM2
ECHO AV AREA VTI: 2.2 CM2
ECHO AV AREA/BSA PEAK VELOCITY: 1.1 CM2/M2
ECHO AV AREA/BSA VTI: 1.1 CM2/M2
ECHO AV MEAN GRADIENT: 8 MMHG
ECHO AV MEAN VELOCITY: 1.3 M/S
ECHO AV PEAK GRADIENT: 15 MMHG
ECHO AV PEAK VELOCITY: 1.9 M/S
ECHO AV VELOCITY RATIO: 0.63
ECHO AV VTI: 34.4 CM
ECHO BSA: 1.97 M2
ECHO LA DIAMETER INDEX: 1.92 CM/M2
ECHO LA DIAMETER: 3.7 CM
ECHO LA TO AORTIC ROOT RATIO: 1.09
ECHO LA VOL A-L A2C: 47 ML (ref 18–58)
ECHO LA VOL A-L A4C: 50 ML (ref 18–58)
ECHO LA VOL BP: 47 ML (ref 18–58)
ECHO LA VOL MOD A2C: 45 ML (ref 18–58)
ECHO LA VOL MOD A4C: 47 ML (ref 18–58)
ECHO LA VOL/BSA BIPLANE: 24 ML/M2 (ref 16–34)
ECHO LA VOLUME AREA LENGTH: 49 ML
ECHO LA VOLUME INDEX A-L A2C: 24 ML/M2 (ref 16–34)
ECHO LA VOLUME INDEX A-L A4C: 26 ML/M2 (ref 16–34)
ECHO LA VOLUME INDEX AREA LENGTH: 25 ML/M2 (ref 16–34)
ECHO LA VOLUME INDEX MOD A2C: 23 ML/M2 (ref 16–34)
ECHO LA VOLUME INDEX MOD A4C: 24 ML/M2 (ref 16–34)
ECHO LV E' LATERAL VELOCITY: 7.05 CM/S
ECHO LV E' SEPTAL VELOCITY: 6.87 CM/S
ECHO LV EDV A2C: 64 ML
ECHO LV EDV A4C: 83 ML
ECHO LV EDV BP: 75 ML (ref 67–155)
ECHO LV EDV INDEX A4C: 43 ML/M2
ECHO LV EDV INDEX BP: 39 ML/M2
ECHO LV EDV NDEX A2C: 33 ML/M2
ECHO LV EF PHYSICIAN: 52 %
ECHO LV EJECTION FRACTION A2C: 51 %
ECHO LV EJECTION FRACTION A4C: 55 %
ECHO LV EJECTION FRACTION BIPLANE: 52 % (ref 55–100)
ECHO LV ESV A2C: 32 ML
ECHO LV ESV A4C: 37 ML
ECHO LV ESV BP: 36 ML (ref 22–58)
ECHO LV ESV INDEX A2C: 17 ML/M2
ECHO LV ESV INDEX A4C: 19 ML/M2
ECHO LV ESV INDEX BP: 19 ML/M2
ECHO LV FRACTIONAL SHORTENING: 26 % (ref 28–44)
ECHO LV INTERNAL DIMENSION DIASTOLE INDEX: 2.38 CM/M2
ECHO LV INTERNAL DIMENSION DIASTOLIC: 4.6 CM (ref 4.2–5.9)
ECHO LV INTERNAL DIMENSION SYSTOLIC INDEX: 1.76 CM/M2
ECHO LV INTERNAL DIMENSION SYSTOLIC: 3.4 CM
ECHO LV IVSD: 1.3 CM (ref 0.6–1)
ECHO LV MASS 2D: 230.2 G (ref 88–224)
ECHO LV MASS INDEX 2D: 119.3 G/M2 (ref 49–115)
ECHO LV POSTERIOR WALL DIASTOLIC: 1.3 CM (ref 0.6–1)
ECHO LV RELATIVE WALL THICKNESS RATIO: 0.57
ECHO LVOT AREA: 3.5 CM2
ECHO LVOT AV VTI INDEX: 0.67
ECHO LVOT DIAM: 2.1 CM
ECHO LVOT MEAN GRADIENT: 3 MMHG
ECHO LVOT PEAK GRADIENT: 6 MMHG
ECHO LVOT PEAK VELOCITY: 1.2 M/S
ECHO LVOT STROKE VOLUME INDEX: 41.3 ML/M2
ECHO LVOT SV: 79.6 ML
ECHO LVOT VTI: 23 CM
ECHO MV A VELOCITY: 0.89 M/S
ECHO MV E DECELERATION TIME (DT): 251.7 MS
ECHO MV E VELOCITY: 0.66 M/S
ECHO MV E/A RATIO: 0.74
ECHO MV E/E' LATERAL: 9.36
ECHO MV E/E' RATIO (AVERAGED): 9.48
ECHO MV E/E' SEPTAL: 9.61
ECHO PV MAX VELOCITY: 1.2 M/S
ECHO PV PEAK GRADIENT: 5 MMHG
ECHO RV BASAL DIMENSION: 3.4 CM
ECHO RV TAPSE: 2.3 CM (ref 1.7–?)
ECHO RVOT PEAK GRADIENT: 3 MMHG
ECHO RVOT PEAK VELOCITY: 0.9 M/S

## 2025-03-04 ENCOUNTER — OFFICE VISIT (OUTPATIENT)
Age: 65
End: 2025-03-04
Payer: MEDICARE

## 2025-03-04 VITALS
SYSTOLIC BLOOD PRESSURE: 126 MMHG | HEIGHT: 66 IN | WEIGHT: 183 LBS | DIASTOLIC BLOOD PRESSURE: 80 MMHG | HEART RATE: 73 BPM | BODY MASS INDEX: 29.41 KG/M2 | OXYGEN SATURATION: 99 %

## 2025-03-04 DIAGNOSIS — I42.9 CARDIOMYOPATHY, UNSPECIFIED TYPE (HCC): ICD-10-CM

## 2025-03-04 DIAGNOSIS — I10 ESSENTIAL HYPERTENSION: ICD-10-CM

## 2025-03-04 DIAGNOSIS — E11.69 TYPE 2 DIABETES MELLITUS WITH OTHER SPECIFIED COMPLICATION, WITHOUT LONG-TERM CURRENT USE OF INSULIN (HCC): ICD-10-CM

## 2025-03-04 DIAGNOSIS — E78.00 HYPERCHOLESTEREMIA: ICD-10-CM

## 2025-03-04 DIAGNOSIS — I25.10 CORONARY ARTERY DISEASE INVOLVING NATIVE CORONARY ARTERY OF NATIVE HEART WITHOUT ANGINA PECTORIS: Primary | ICD-10-CM

## 2025-03-04 PROCEDURE — 3079F DIAST BP 80-89 MM HG: CPT

## 2025-03-04 PROCEDURE — 93010 ELECTROCARDIOGRAM REPORT: CPT

## 2025-03-04 PROCEDURE — 4004F PT TOBACCO SCREEN RCVD TLK: CPT

## 2025-03-04 PROCEDURE — G8419 CALC BMI OUT NRM PARAM NOF/U: HCPCS

## 2025-03-04 PROCEDURE — 93005 ELECTROCARDIOGRAM TRACING: CPT

## 2025-03-04 PROCEDURE — 3074F SYST BP LT 130 MM HG: CPT

## 2025-03-04 PROCEDURE — 2022F DILAT RTA XM EVC RTNOPTHY: CPT

## 2025-03-04 PROCEDURE — G8427 DOCREV CUR MEDS BY ELIG CLIN: HCPCS

## 2025-03-04 PROCEDURE — 1123F ACP DISCUSS/DSCN MKR DOCD: CPT

## 2025-03-04 PROCEDURE — 3046F HEMOGLOBIN A1C LEVEL >9.0%: CPT

## 2025-03-04 PROCEDURE — 99214 OFFICE O/P EST MOD 30 MIN: CPT

## 2025-03-04 PROCEDURE — 3017F COLORECTAL CA SCREEN DOC REV: CPT

## 2025-03-04 ASSESSMENT — PATIENT HEALTH QUESTIONNAIRE - PHQ9
SUM OF ALL RESPONSES TO PHQ QUESTIONS 1-9: 0
2. FEELING DOWN, DEPRESSED OR HOPELESS: NOT AT ALL
1. LITTLE INTEREST OR PLEASURE IN DOING THINGS: NOT AT ALL
SUM OF ALL RESPONSES TO PHQ QUESTIONS 1-9: 0

## 2025-03-04 NOTE — ASSESSMENT & PLAN NOTE
-reports he has been off all medications for about 1 year  -BP looks good today  -will reassess at follow-up in 1 month    Orders:    EKG 12 Lead    CBC with Auto Differential; Future    Comprehensive Metabolic Panel; Future

## 2025-03-04 NOTE — PATIENT INSTRUCTIONS
Please check your blood pressure daily (at least one hour after your morning blood pressure medications.)  Keep a written record of your blood pressure readings and bring it to each appointment.  If your systolic blood pressure is consistently greater than 150mmHg or less than 100mmHg then please call the office at [phone].

## 2025-03-04 NOTE — ASSESSMENT & PLAN NOTE
-Previous stent to LAD June 2020  -[facility] May 2023 Widely patent pre-existing proximal LAD stent.    Late mid LAD 40 to 50% narrowing that is smooth and tubular, and proven to be hemodynamically insignificant by [employer] interrogation.    Otherwise, no significant CAD.    Recommend continued aggressive medical management.  -Not very tolerant to beta-blocker  -continues on ASA 81 mg but is off statin, has nitro PRN but has not needed  -denies any chest pain, SOB      Orders:    EKG 12 Lead

## 2025-03-04 NOTE — PROGRESS NOTES
1. Have you been to the ER, urgent care clinic since your last visit?  Hospitalized since your last visit?No    2. Have you seen or consulted any other health care providers outside of the [facility] since your last visit?  Include any pap smears or colon screening. No    
    Physical Exam:  /80 (Site: Right Upper Arm, Position: Sitting, Cuff Size: Medium Adult)   Pulse 73   Ht 1.676 m (5' 6\")   Wt 83 kg (183 lb)   SpO2 99%   BMI 29.54 kg/m²     General: alert, cooperative, no distress, appears stated age  Neck: supple, symmetrical, trachea midline, no adenopathy, thyroid: not enlarged, symmetric, no tenderness/mass/nodules, no carotid bruit, and no JVD  Lungs: clear to auscultation bilaterally  Heart: regular rate and rhythm, S1, S2 normal, no murmur, no click, rub or gallop  Abdomen: soft, non tender  Extremities: extremities normal, atraumatic, no cyanosis or edema  Skin: No significant rashes  MSKTL: Overall good ROM ext  Neuro: Grossly intact  Psych: Appropriate affect    LABS / OTHER STUDIES:     Lab Results   Component Value Date/Time     02/27/2024 08:48 AM    K 3.6 02/27/2024 08:48 AM     02/27/2024 08:48 AM    CO2 27 02/27/2024 08:48 AM    BUN 9 02/27/2024 08:48 AM    GFRAA >60 07/18/2022 02:24 PM    GLOB 3.8 02/27/2024 08:48 AM    ALT 16 02/27/2024 08:48 AM    AST 18 02/27/2024 08:48 AM       Lab Results   Component Value Date/Time    CHOL 227 02/27/2024 08:48 AM    HDL 60 02/27/2024 08:48 AM    .8 02/27/2024 08:48 AM    VLDL 22.2 02/27/2024 08:48 AM       Cholesterol, Total   Date Value Ref Range Status   02/27/2024 227 (H) <200 MG/DL Final     Triglycerides   Date Value Ref Range Status   02/27/2024 111 <150 MG/DL Final     Comment:     Based on NCEP-ATP III:  Triglycerides <150 mg/dL  is considered normal, 150-199 mg/dL  borderline high,  200-499 mg/dL high and  greater than or equal to 500 mg/dL very high.     HDL   Date Value Ref Range Status   02/27/2024 60 MG/DL Final     Comment:     Based on NCEP ATP III, HDL Cholesterol <40 mg/dL is considered low and >60 mg/dL is elevated.         CARDIAC DIAGNOSTICS:     Cardiac Evaluation Includes:  I reviewed the test results below.  02/18/25    ECHO (TTE) COMPLETE (PRN CONTRAST/BUBBLE/STRAIN/3D)

## 2025-03-04 NOTE — ASSESSMENT & PLAN NOTE
-has been off all medications for about 1 year, discussed need to be on statin but he wants to check labs first-will have him follow-up in 1 month  - was previously at goal 69.2 in January 2023. Recent labwork January 2024 .8  Orders:    EKG 12 Lead    Lipid Panel; Future

## 2025-04-01 DIAGNOSIS — E11.69 TYPE 2 DIABETES MELLITUS WITH OTHER SPECIFIED COMPLICATION, WITHOUT LONG-TERM CURRENT USE OF INSULIN: ICD-10-CM

## 2025-04-01 DIAGNOSIS — E78.00 HYPERCHOLESTEREMIA: ICD-10-CM

## 2025-04-01 DIAGNOSIS — I10 ESSENTIAL HYPERTENSION: ICD-10-CM

## 2025-04-02 LAB
ALBUMIN SERPL-MCNC: 3.9 G/DL (ref 3.5–5)
ALBUMIN/GLOB SERPL: 1.1 (ref 1.1–2.2)
ALP SERPL-CCNC: 108 U/L (ref 45–117)
ALT SERPL-CCNC: 21 U/L (ref 12–78)
ANION GAP SERPL CALC-SCNC: 6 MMOL/L (ref 2–12)
AST SERPL-CCNC: 14 U/L (ref 15–37)
BASOPHILS # BLD: 0.06 K/UL (ref 0–0.1)
BASOPHILS NFR BLD: 0.7 % (ref 0–1)
BILIRUB SERPL-MCNC: 0.5 MG/DL (ref 0.2–1)
BUN SERPL-MCNC: 9 MG/DL (ref 6–20)
BUN/CREAT SERPL: 11 (ref 12–20)
CALCIUM SERPL-MCNC: 9.9 MG/DL (ref 8.5–10.1)
CHLORIDE SERPL-SCNC: 107 MMOL/L (ref 97–108)
CHOLEST SERPL-MCNC: 222 MG/DL
CO2 SERPL-SCNC: 27 MMOL/L (ref 21–32)
CREAT SERPL-MCNC: 0.82 MG/DL (ref 0.7–1.3)
DIFFERENTIAL METHOD BLD: ABNORMAL
EOSINOPHIL # BLD: 0.1 K/UL (ref 0–0.4)
EOSINOPHIL NFR BLD: 1.2 % (ref 0–7)
ERYTHROCYTE [DISTWIDTH] IN BLOOD BY AUTOMATED COUNT: 15.3 % (ref 11.5–14.5)
EST. AVERAGE GLUCOSE BLD GHB EST-MCNC: 209 MG/DL
GLOBULIN SER CALC-MCNC: 3.7 G/DL (ref 2–4)
GLUCOSE SERPL-MCNC: 161 MG/DL (ref 65–100)
HBA1C MFR BLD: 8.9 % (ref 4–5.6)
HCT VFR BLD AUTO: 42.8 % (ref 36.6–50.3)
HDLC SERPL-MCNC: 87 MG/DL
HDLC SERPL: 2.6 (ref 0–5)
HGB BLD-MCNC: 14.9 G/DL (ref 12.1–17)
IMM GRANULOCYTES # BLD AUTO: 0.03 K/UL (ref 0–0.04)
IMM GRANULOCYTES NFR BLD AUTO: 0.4 % (ref 0–0.5)
LDLC SERPL CALC-MCNC: 113.8 MG/DL (ref 0–100)
LYMPHOCYTES # BLD: 3.67 K/UL (ref 0.8–3.5)
LYMPHOCYTES NFR BLD: 44.4 % (ref 12–49)
MCH RBC QN AUTO: 26.5 PG (ref 26–34)
MCHC RBC AUTO-ENTMCNC: 34.8 G/DL (ref 30–36.5)
MCV RBC AUTO: 76.2 FL (ref 80–99)
MONOCYTES # BLD: 0.57 K/UL (ref 0–1)
MONOCYTES NFR BLD: 6.9 % (ref 5–13)
NEUTS SEG # BLD: 3.84 K/UL (ref 1.8–8)
NEUTS SEG NFR BLD: 46.4 % (ref 32–75)
NRBC # BLD: 0 K/UL (ref 0–0.01)
NRBC BLD-RTO: 0 PER 100 WBC
PLATELET # BLD AUTO: 335 K/UL (ref 150–400)
PMV BLD AUTO: 10.2 FL (ref 8.9–12.9)
POTASSIUM SERPL-SCNC: 4 MMOL/L (ref 3.5–5.1)
PROT SERPL-MCNC: 7.6 G/DL (ref 6.4–8.2)
RBC # BLD AUTO: 5.62 M/UL (ref 4.1–5.7)
SODIUM SERPL-SCNC: 140 MMOL/L (ref 136–145)
TRIGL SERPL-MCNC: 106 MG/DL
VLDLC SERPL CALC-MCNC: 21.2 MG/DL
WBC # BLD AUTO: 8.3 K/UL (ref 4.1–11.1)

## 2025-04-10 ENCOUNTER — OFFICE VISIT (OUTPATIENT)
Age: 65
End: 2025-04-10
Payer: MEDICARE

## 2025-04-10 VITALS
DIASTOLIC BLOOD PRESSURE: 83 MMHG | RESPIRATION RATE: 16 BRPM | BODY MASS INDEX: 27.93 KG/M2 | HEART RATE: 75 BPM | TEMPERATURE: 98.7 F | HEIGHT: 66 IN | WEIGHT: 173.8 LBS | SYSTOLIC BLOOD PRESSURE: 122 MMHG | OXYGEN SATURATION: 99 %

## 2025-04-10 DIAGNOSIS — Z13.6 SCREENING FOR AAA (ABDOMINAL AORTIC ANEURYSM): ICD-10-CM

## 2025-04-10 DIAGNOSIS — Z71.6 ENCOUNTER FOR TOBACCO USE CESSATION COUNSELING: ICD-10-CM

## 2025-04-10 DIAGNOSIS — N52.9 ERECTILE DYSFUNCTION, UNSPECIFIED ERECTILE DYSFUNCTION TYPE: ICD-10-CM

## 2025-04-10 DIAGNOSIS — Z00.00 WELCOME TO MEDICARE PREVENTIVE VISIT: Primary | ICD-10-CM

## 2025-04-10 DIAGNOSIS — E78.00 HYPERCHOLESTEREMIA: ICD-10-CM

## 2025-04-10 DIAGNOSIS — I42.9 CARDIOMYOPATHY, UNSPECIFIED TYPE (HCC): ICD-10-CM

## 2025-04-10 DIAGNOSIS — G47.33 OSA (OBSTRUCTIVE SLEEP APNEA): ICD-10-CM

## 2025-04-10 DIAGNOSIS — I25.10 CORONARY ARTERY DISEASE INVOLVING NATIVE CORONARY ARTERY OF NATIVE HEART WITHOUT ANGINA PECTORIS: ICD-10-CM

## 2025-04-10 DIAGNOSIS — I10 ESSENTIAL HYPERTENSION: ICD-10-CM

## 2025-04-10 DIAGNOSIS — E11.65 TYPE 2 DIABETES MELLITUS WITH HYPERGLYCEMIA, WITHOUT LONG-TERM CURRENT USE OF INSULIN (HCC): ICD-10-CM

## 2025-04-10 DIAGNOSIS — Z12.5 ENCOUNTER FOR PROSTATE CANCER SCREENING: ICD-10-CM

## 2025-04-10 PROCEDURE — 3052F HG A1C>EQUAL 8.0%<EQUAL 9.0%: CPT | Performed by: NURSE PRACTITIONER

## 2025-04-10 PROCEDURE — 3079F DIAST BP 80-89 MM HG: CPT | Performed by: NURSE PRACTITIONER

## 2025-04-10 PROCEDURE — G2211 COMPLEX E/M VISIT ADD ON: HCPCS | Performed by: NURSE PRACTITIONER

## 2025-04-10 PROCEDURE — 1124F ACP DISCUSS-NO DSCNMKR DOCD: CPT | Performed by: NURSE PRACTITIONER

## 2025-04-10 PROCEDURE — 99214 OFFICE O/P EST MOD 30 MIN: CPT | Performed by: NURSE PRACTITIONER

## 2025-04-10 PROCEDURE — G0402 INITIAL PREVENTIVE EXAM: HCPCS | Performed by: NURSE PRACTITIONER

## 2025-04-10 PROCEDURE — 3074F SYST BP LT 130 MM HG: CPT | Performed by: NURSE PRACTITIONER

## 2025-04-10 RX ORDER — DAPAGLIFLOZIN 10 MG/1
10 TABLET, FILM COATED ORAL EVERY MORNING
Qty: 90 TABLET | Refills: 0 | Status: SHIPPED | OUTPATIENT
Start: 2025-04-10

## 2025-04-10 RX ORDER — NICOTINE 21 MG/24HR
1 PATCH, TRANSDERMAL 24 HOURS TRANSDERMAL DAILY
Qty: 14 PATCH | Refills: 5 | Status: SHIPPED | OUTPATIENT
Start: 2025-04-10 | End: 2025-04-24

## 2025-04-10 RX ORDER — LISINOPRIL 5 MG/1
5 TABLET ORAL DAILY
Qty: 90 TABLET | Refills: 1 | Status: SHIPPED | OUTPATIENT
Start: 2025-04-10

## 2025-04-10 RX ORDER — GLIPIZIDE 5 MG/1
5 TABLET, FILM COATED, EXTENDED RELEASE ORAL DAILY
Qty: 90 TABLET | Refills: 0 | Status: SHIPPED | OUTPATIENT
Start: 2025-04-10

## 2025-04-10 RX ORDER — TADALAFIL 20 MG/1
20 TABLET ORAL DAILY PRN
Qty: 30 TABLET | Refills: 1 | Status: SHIPPED | OUTPATIENT
Start: 2025-04-10

## 2025-04-10 RX ORDER — ATORVASTATIN CALCIUM 40 MG/1
40 TABLET, FILM COATED ORAL DAILY
Qty: 90 TABLET | Refills: 1 | Status: SHIPPED | OUTPATIENT
Start: 2025-04-10

## 2025-04-10 SDOH — ECONOMIC STABILITY: FOOD INSECURITY: WITHIN THE PAST 12 MONTHS, THE FOOD YOU BOUGHT JUST DIDN'T LAST AND YOU DIDN'T HAVE MONEY TO GET MORE.: NEVER TRUE

## 2025-04-10 SDOH — ECONOMIC STABILITY: FOOD INSECURITY: WITHIN THE PAST 12 MONTHS, YOU WORRIED THAT YOUR FOOD WOULD RUN OUT BEFORE YOU GOT MONEY TO BUY MORE.: NEVER TRUE

## 2025-04-10 ASSESSMENT — PATIENT HEALTH QUESTIONNAIRE - PHQ9
SUM OF ALL RESPONSES TO PHQ QUESTIONS 1-9: 0
2. FEELING DOWN, DEPRESSED OR HOPELESS: NOT AT ALL
SUM OF ALL RESPONSES TO PHQ QUESTIONS 1-9: 0
2. FEELING DOWN, DEPRESSED OR HOPELESS: NOT AT ALL
1. LITTLE INTEREST OR PLEASURE IN DOING THINGS: NOT AT ALL
1. LITTLE INTEREST OR PLEASURE IN DOING THINGS: NOT AT ALL
SUM OF ALL RESPONSES TO PHQ QUESTIONS 1-9: 0
SUM OF ALL RESPONSES TO PHQ QUESTIONS 1-9: 0

## 2025-04-10 ASSESSMENT — VISUAL ACUITY
OS_CC: 20/40
OD_CC: 20/100

## 2025-04-10 ASSESSMENT — LIFESTYLE VARIABLES
HOW MANY STANDARD DRINKS CONTAINING ALCOHOL DO YOU HAVE ON A TYPICAL DAY: 1 OR 2
HOW OFTEN DO YOU HAVE A DRINK CONTAINING ALCOHOL: MONTHLY OR LESS

## 2025-04-10 NOTE — ACP (ADVANCE CARE PLANNING)
Advance Care Planning   Advance Care Planning (ACP)     Attempted to discuss ACP with Mr. Prather today, he declines to discuss at this time.  Will readdress at future visit.

## 2025-04-10 NOTE — PROGRESS NOTES
[facility]  Clinic Note    Medicare Annual Wellness Visit    Pelon Prather is here for Medicare AWV    Assessment & Plan   Welcome to Medicare preventive visit    Type 2 diabetes mellitus with hyperglycemia, without long-term current use of insulin (HCC)  Assessment & Plan:   Chronic, worsening (exacerbation)  4/1/2025 A1c = 8.9  2/26/2024 A1c = 7.9  9/6/2023 A1c = 8.1  1/31/2023 A1c = 10.8  Orders:  -     Albumin/Creatinine Ratio, Urine; Future  -     dapagliflozin (FARXIGA) 10 MG tablet; Take 1 tablet by mouth every morning, Disp-90 tablet, R-0Normal  -     glipiZIDE (GLUCOTROL XL) 5 MG extended release tablet; Take 1 tablet by mouth daily, Disp-90 tablet, R-0Normal  -     atorvastatin (LIPITOR) 40 MG tablet; Take 1 tablet by mouth daily, Disp-90 tablet, R-1Normal  -     lisinopril (PRINIVIL;ZESTRIL) 5 MG tablet; Take 1 tablet by mouth daily, Disp-90 tablet, R-1Normal  -     HM DIABETES FOOT EXAM    Cardiomyopathy, unspecified type (HCC)  Assessment & Plan:   - Last seen by cardiology 3/4/2025  - June 2023 EF 56% Left ventricle size is normal. Increased wall thickness. Findings consistent with severe eccentric hypertrophy. Normal wall motion. Echocardiographic features are suggestive of hypertrophic cardiomyopathy with borderline obstruction based on gradient.  AV mildly thickened cusps.  No stenosis.  MV mildly thickened leaflets.  Mild systolic anterior motion of chordae without pressure gradient  -Echo November 2024 EF by 2D Simpsons Biplane is 52%. LV size is normal. Findings consistent with mild concentric hypertrophy.  Aortic Valve: Trileaflet valve. Moderately calcified cusps. Trace regurgitation. Prior study date: 6/26/2023. As compared to the previous study, there are significant changes. Decrease in LVH wall thickness . No LVOT gradient now seen.    Essential hypertension  -     lisinopril (PRINIVIL;ZESTRIL) 5 MG tablet; Take 1 tablet by mouth daily, Disp-90 tablet,

## 2025-04-10 NOTE — ASSESSMENT & PLAN NOTE
Chronic, worsening (exacerbation)  4/1/2025 A1c = 8.9  2/26/2024 A1c = 7.9  9/6/2023 A1c = 8.1  1/31/2023 A1c = 10.8

## 2025-04-10 NOTE — PROGRESS NOTES
Chief Complaint   Patient presents with    Medicare AWV     /83   Pulse 75   Temp 98.7 °F (37.1 °C)   Resp 16   Ht 1.676 m (5' 6\")   Wt 78.8 kg (173 lb 12.8 oz)   SpO2 99%   BMI 28.05 kg/m²   \"Have you been to the ER, urgent care clinic since your last visit?  Hospitalized since your last visit?\"    NO    “Have you seen or consulted any other health care providers outside our system since your last visit?”    NO      “Have you had a diabetic eye exam?”    NO     No diabetic eye exam on file

## 2025-04-10 NOTE — ASSESSMENT & PLAN NOTE
- Last discussed with cardiology. Off all medications for about 1 year, needs to be on statin but he wants to check labs first-will have him follow-up in 1 month

## 2025-04-10 NOTE — ASSESSMENT & PLAN NOTE
- Last seen by cardiology 3/4/2025  - June 2023 EF 56% Left ventricle size is normal. Increased wall thickness. Findings consistent with severe eccentric hypertrophy. Normal wall motion. Echocardiographic features are suggestive of hypertrophic cardiomyopathy with borderline obstruction based on gradient.  AV mildly thickened cusps.  No stenosis.  MV mildly thickened leaflets.  Mild systolic anterior motion of chordae without pressure gradient  -Echo November 2024 EF by 2D Simpsons Biplane is 52%. LV size is normal. Findings consistent with mild concentric hypertrophy.  Aortic Valve: Trileaflet valve. Moderately calcified cusps. Trace regurgitation. Prior study date: 6/26/2023. As compared to the previous study, there are significant changes. Decrease in LVH wall thickness . No LVOT gradient now seen.

## 2025-04-10 NOTE — ASSESSMENT & PLAN NOTE
- Last seen by Cardiology 3/4/2025. Notes reviewed.  -Previous stent to LAD June 2020  -[facility] May 2023 Widely patent pre-existing proximal LAD stent.    Late mid LAD 40 to 50% narrowing that is smooth and tubular, and proven to be hemodynamically insignificant by PressureWire interrogation.    Otherwise, no significant CAD.    Recommend continued aggressive medical management.  -Not very tolerant to beta-blocker  -continues on ASA 81 mg but is off statin, has nitro PRN but has not needed

## 2025-04-11 LAB
CREAT UR-MCNC: 226 MG/DL
MICROALBUMIN UR-MCNC: 9.85 MG/DL
MICROALBUMIN/CREAT UR-RTO: 44 MG/G (ref 0–30)
PSA SERPL-MCNC: 1.1 NG/ML (ref 0.01–4)

## 2025-07-10 ENCOUNTER — OFFICE VISIT (OUTPATIENT)
Age: 65
End: 2025-07-10
Payer: MEDICARE

## 2025-07-10 VITALS
OXYGEN SATURATION: 97 % | SYSTOLIC BLOOD PRESSURE: 116 MMHG | RESPIRATION RATE: 16 BRPM | DIASTOLIC BLOOD PRESSURE: 79 MMHG | TEMPERATURE: 97.7 F | HEART RATE: 73 BPM

## 2025-07-10 DIAGNOSIS — I25.10 CORONARY ARTERY DISEASE INVOLVING NATIVE CORONARY ARTERY OF NATIVE HEART WITHOUT ANGINA PECTORIS: ICD-10-CM

## 2025-07-10 DIAGNOSIS — N52.9 ERECTILE DYSFUNCTION, UNSPECIFIED ERECTILE DYSFUNCTION TYPE: ICD-10-CM

## 2025-07-10 DIAGNOSIS — E11.65 TYPE 2 DIABETES MELLITUS WITH HYPERGLYCEMIA, WITHOUT LONG-TERM CURRENT USE OF INSULIN (HCC): Primary | ICD-10-CM

## 2025-07-10 DIAGNOSIS — Z00.00 HEALTHCARE MAINTENANCE: ICD-10-CM

## 2025-07-10 DIAGNOSIS — I10 ESSENTIAL HYPERTENSION: ICD-10-CM

## 2025-07-10 DIAGNOSIS — G47.33 OSA (OBSTRUCTIVE SLEEP APNEA): ICD-10-CM

## 2025-07-10 DIAGNOSIS — E78.00 HYPERCHOLESTEREMIA: ICD-10-CM

## 2025-07-10 DIAGNOSIS — G44.52 NEW DAILY PERSISTENT HEADACHE: ICD-10-CM

## 2025-07-10 LAB — HBA1C MFR BLD: 7.9 %

## 2025-07-10 PROCEDURE — 83036 HEMOGLOBIN GLYCOSYLATED A1C: CPT | Performed by: NURSE PRACTITIONER

## 2025-07-10 PROCEDURE — 99214 OFFICE O/P EST MOD 30 MIN: CPT | Performed by: NURSE PRACTITIONER

## 2025-07-10 RX ORDER — ATORVASTATIN CALCIUM 40 MG/1
40 TABLET, FILM COATED ORAL DAILY
Qty: 90 TABLET | Refills: 1 | Status: SHIPPED | OUTPATIENT
Start: 2025-07-10

## 2025-07-10 RX ORDER — BLOOD-GLUCOSE METER
1 KIT MISCELLANEOUS DAILY
Qty: 1 KIT | Refills: 0 | Status: SHIPPED | OUTPATIENT
Start: 2025-07-10

## 2025-07-10 RX ORDER — AVOBENZONE, HOMOSALATE, OCTISALATE, OCTOCRYLENE 30; 40; 45; 26 MG/ML; MG/ML; MG/ML; MG/ML
1 CREAM TOPICAL DAILY
Qty: 100 EACH | Refills: 5 | Status: SHIPPED | OUTPATIENT
Start: 2025-07-10

## 2025-07-10 RX ORDER — GLIPIZIDE 5 MG/1
5 TABLET, FILM COATED, EXTENDED RELEASE ORAL DAILY
Qty: 90 TABLET | Refills: 0 | Status: SHIPPED | OUTPATIENT
Start: 2025-07-10

## 2025-07-10 RX ORDER — DAPAGLIFLOZIN 10 MG/1
10 TABLET, FILM COATED ORAL EVERY MORNING
Qty: 90 TABLET | Refills: 0 | Status: SHIPPED | OUTPATIENT
Start: 2025-07-10

## 2025-07-10 RX ORDER — GLUCOSAMINE HCL/CHONDROITIN SU 500-400 MG
CAPSULE ORAL
Qty: 100 STRIP | Refills: 2 | Status: SHIPPED | OUTPATIENT
Start: 2025-07-10

## 2025-07-10 RX ORDER — LISINOPRIL 5 MG/1
5 TABLET ORAL DAILY
Qty: 90 TABLET | Refills: 1 | Status: SHIPPED | OUTPATIENT
Start: 2025-07-10

## 2025-07-10 ASSESSMENT — PATIENT HEALTH QUESTIONNAIRE - PHQ9
SUM OF ALL RESPONSES TO PHQ QUESTIONS 1-9: 0
SUM OF ALL RESPONSES TO PHQ QUESTIONS 1-9: 0
2. FEELING DOWN, DEPRESSED OR HOPELESS: NOT AT ALL
SUM OF ALL RESPONSES TO PHQ QUESTIONS 1-9: 0
SUM OF ALL RESPONSES TO PHQ QUESTIONS 1-9: 0
1. LITTLE INTEREST OR PLEASURE IN DOING THINGS: NOT AT ALL

## 2025-07-10 NOTE — ASSESSMENT & PLAN NOTE
-Intermittent use of device.  Having trouble with mask and needs replacement.  Has not seen sleep medicine in 3 to 4 years.  Recommended follow-up.  - I suspect this is a contributing factor to his daily headache  Orders:    [facility] - Lawanda Collazo MD, Sleep [facility]Tray ([address])

## 2025-07-10 NOTE — ASSESSMENT & PLAN NOTE
Chronic.  Not at goal of <7.0.  A1c trending downward.  - Maintain fasting blood sugar , and daytime levels up to 180.  - Prescription for blood sugar monitor sent to pharmacy with usage instructions. Record fasting and postprandial levels for a week.  7/10/2025 A1c = 7.9  4/1/2025 A1c = 8.9  2/26/2024 A1c = 7.9  9/6/2023 A1c = 8.1  1/31/2023 A1c = 10.8  Orders:    AMB POC HEMOGLOBIN A1C    glucose monitoring kit; 1 kit by Does not apply route daily Test fasting glucose daily; Dx E11.8; Pharmacist to choose based on insurance/glucose monitoring kit type    blood glucose monitor strips; Test fasting glucose daily; Dx E11.8; Pharmacist to choose based on insurance/glucose monitoring kit type    Lancets MISC; 1 each by Does not apply route daily    atorvastatin (LIPITOR) 40 MG tablet; Take 1 tablet by mouth daily    dapagliflozin (FARXIGA) 10 MG tablet; Take 1 tablet by mouth every morning    glipiZIDE (GLUCOTROL XL) 5 MG extended release tablet; Take 1 tablet by mouth daily    lisinopril (PRINIVIL;ZESTRIL) 5 MG tablet; Take 1 tablet by mouth daily

## 2025-07-10 NOTE — PROGRESS NOTES
Chief Complaint   Patient presents with    Diabetes    1. Have you been to the ER, urgent care clinic since your last visit?  Hospitalized since your last visit?No    2. Have you seen or consulted any other health care providers outside of the [facility] since your last visit?  Include any pap smears or colon screening. No

## 2025-07-10 NOTE — ASSESSMENT & PLAN NOTE
-Followed by cardiology  - Previous stent to LAD June 2020  -[facility] May 2023 Widely patent pre-existing proximal LAD stent.  Late mid LAD 40 to 50% narrowing that is smooth and tubular, and proven to be hemodynamically insignificant by PressureWire interrogation.   Otherwise, no significant CAD.  -Not very tolerant to beta-blocker  -continues on ASA 81 mg but is off statin, has nitro PRN but has not needed  Orders:    atorvastatin (LIPITOR) 40 MG tablet; Take 1 tablet by mouth daily    lisinopril (PRINIVIL;ZESTRIL) 5 MG tablet; Take 1 tablet by mouth daily     Detail Level: Detailed

## 2025-07-10 NOTE — ASSESSMENT & PLAN NOTE
Lab Results   Component Value Date    CHOL 222 (H) 04/01/2025    TRIG 106 04/01/2025    HDL 87 04/01/2025    .8 (H) 04/01/2025    VLDL 21.2 04/01/2025    CHOLHDLRATIO 2.6 04/01/2025   Orders:    atorvastatin (LIPITOR) 40 MG tablet; Take 1 tablet by mouth daily

## 2025-07-10 NOTE — PROGRESS NOTES
[facility]  Clinic Note     Pelon Prather (: 1960) is a 65 y.o. male, established patient, here for evaluation of the following chief complaint(s):  Diabetes       ASSESSMENT/PLAN:    Assessment & Plan  Type 2 diabetes mellitus with hyperglycemia, without long-term current use of insulin (HCC)  Chronic.  Not at goal of <7.0.  A1c trending downward.  - Maintain fasting blood sugar , and daytime levels up to 180.  - Prescription for blood sugar monitor sent to pharmacy with usage instructions. Record fasting and postprandial levels for a week.  7/10/2025 A1c = 7.9  2025 A1c = 8.9  2024 A1c = 7.9  2023 A1c = 8.1  2023 A1c = 10.8  Orders:    AMB POC HEMOGLOBIN A1C    glucose monitoring kit; 1 kit by Does not apply route daily Test fasting glucose daily; Dx E11.8; Pharmacist to choose based on insurance/glucose monitoring kit type    blood glucose monitor strips; Test fasting glucose daily; Dx E11.8; Pharmacist to choose based on insurance/glucose monitoring kit type    Lancets MISC; 1 each by Does not apply route daily    atorvastatin (LIPITOR) 40 MG tablet; Take 1 tablet by mouth daily    dapagliflozin (FARXIGA) 10 MG tablet; Take 1 tablet by mouth every morning    glipiZIDE (GLUCOTROL XL) 5 MG extended release tablet; Take 1 tablet by mouth daily    lisinopril (PRINIVIL;ZESTRIL) 5 MG tablet; Take 1 tablet by mouth daily    AMOS (obstructive sleep apnea)   -Intermittent use of device.  Having trouble with mask and needs replacement.  Has not seen sleep medicine in 3 to 4 years.  Recommended follow-up.  - I suspect this is a contributing factor to his daily headache  Orders:    [facility] - Lawanda Collazo MD, Sleep MedicineTray ([address])    Hypercholesteremia  Lab Results   Component Value Date    CHOL 222 (H) 2025    TRIG 106 2025    HDL 87 2025    .8 (H) 2025    VLDL 21.2 2025    CHOLHDLRATIO 2.6 2025   Orders:

## 2025-07-10 NOTE — TELEPHONE ENCOUNTER
PCP: Latanya Louis APRN - NP    Last appt: 7/10/2025     Future Appointments   Date Time Provider Department Center   8/4/2025  8:40 AM Son Butts MD [facility]   10/17/2025  9:00 AM Latanya Louis APRN - NP [facility]       Requested Prescriptions     Pending Prescriptions Disp Refills    tadalafil (CIALIS) 20 MG tablet [Pharmacy Med Name: TADALAFIL 20 MG TABLET] 30 tablet 1     Sig: TAKE 1 TABLET BY MOUTH DAILY AS NEEDED FOR ERECTILE DYSFUNCTION PRIOR TO ANTICIPATED SEXUAL ACTIVITY AS A SINGLE DOSE AND NOT MORE THAN ONCE DAILY       Prior labs and Blood pressures:  BP Readings from Last 3 Encounters:   07/10/25 116/79   04/10/25 122/83   03/04/25 126/80     Lab Results   Component Value Date/Time     04/01/2025 11:28 AM    K 4.0 04/01/2025 11:28 AM     04/01/2025 11:28 AM    CO2 27 04/01/2025 11:28 AM    BUN 9 04/01/2025 11:28 AM    GFRAA >60 07/18/2022 02:24 PM     Lab Results   Component Value Date/Time    TUV6OLIX 7.9 07/10/2025 10:13 AM     Lab Results   Component Value Date/Time    CHOL 222 04/01/2025 11:28 AM    HDL 87 04/01/2025 11:28 AM    .8 04/01/2025 11:28 AM    .8 02/27/2024 08:48 AM    VLDL 21.2 04/01/2025 11:28 AM     No results found for: \"VITD3\"    Lab Results   Component Value Date/Time    TSH 0.60 01/31/2023 03:36 PM

## 2025-07-10 NOTE — ASSESSMENT & PLAN NOTE
- Controlled. No dose adjustment at this time  Orders:    lisinopril (PRINIVIL;ZESTRIL) 5 MG tablet; Take 1 tablet by mouth daily

## 2025-07-11 RX ORDER — TADALAFIL 20 MG/1
TABLET ORAL
Qty: 30 TABLET | Refills: 1 | Status: SHIPPED | OUTPATIENT
Start: 2025-07-11

## 2025-08-04 ENCOUNTER — OFFICE VISIT (OUTPATIENT)
Age: 65
End: 2025-08-04
Payer: MEDICARE

## 2025-08-04 VITALS
DIASTOLIC BLOOD PRESSURE: 80 MMHG | RESPIRATION RATE: 16 BRPM | WEIGHT: 178 LBS | HEIGHT: 66 IN | OXYGEN SATURATION: 99 % | HEART RATE: 84 BPM | SYSTOLIC BLOOD PRESSURE: 120 MMHG | BODY MASS INDEX: 28.61 KG/M2

## 2025-08-04 DIAGNOSIS — I25.10 CORONARY ARTERY DISEASE INVOLVING NATIVE CORONARY ARTERY OF NATIVE HEART WITHOUT ANGINA PECTORIS: Primary | ICD-10-CM

## 2025-08-04 DIAGNOSIS — E78.00 HYPERCHOLESTEREMIA: ICD-10-CM

## 2025-08-04 DIAGNOSIS — I10 ESSENTIAL HYPERTENSION: ICD-10-CM

## 2025-08-04 DIAGNOSIS — I11.9 HYPERTENSIVE LEFT VENTRICULAR HYPERTROPHY, WITHOUT HEART FAILURE: ICD-10-CM

## 2025-08-04 PROCEDURE — 1123F ACP DISCUSS/DSCN MKR DOCD: CPT | Performed by: SPECIALIST

## 2025-08-04 PROCEDURE — 99214 OFFICE O/P EST MOD 30 MIN: CPT | Performed by: SPECIALIST

## 2025-08-04 PROCEDURE — 3074F SYST BP LT 130 MM HG: CPT | Performed by: SPECIALIST

## 2025-08-04 PROCEDURE — 3079F DIAST BP 80-89 MM HG: CPT | Performed by: SPECIALIST

## 2025-08-04 PROCEDURE — G2211 COMPLEX E/M VISIT ADD ON: HCPCS | Performed by: SPECIALIST

## 2025-08-04 RX ORDER — EZETIMIBE 10 MG/1
10 TABLET ORAL DAILY
Qty: 90 TABLET | Refills: 3 | Status: SHIPPED | OUTPATIENT
Start: 2025-08-04

## 2025-08-04 ASSESSMENT — PATIENT HEALTH QUESTIONNAIRE - PHQ9
SUM OF ALL RESPONSES TO PHQ QUESTIONS 1-9: 0
1. LITTLE INTEREST OR PLEASURE IN DOING THINGS: NOT AT ALL
2. FEELING DOWN, DEPRESSED OR HOPELESS: NOT AT ALL

## (undated) DEVICE — KIT HND CTRL 3 W STPCOCK ROT END 54IN PREM HI PRSS TBNG AT

## (undated) DEVICE — 3M™ TEGADERM™ TRANSPARENT FILM DRESSING FRAME STYLE, 1626W, 4 IN X 4-3/4 IN (10 CM X 12 CM), 50/CT 4CT/CASE: Brand: 3M™ TEGADERM™

## (undated) DEVICE — TUBING PRSS MON L6IN PVC M FEM CONN

## (undated) DEVICE — CATHETER DIAG 5FR L100CM LUMN ID0.047IN JL3.5 CRV 0 SIDE H

## (undated) DEVICE — KIT MFLD ISOLATN NACL CNTRST PRT TBNG SPIK W/ PRSS TRNSDUC

## (undated) DEVICE — COPILOT BLEEDBACK CONTROL VALVE: Brand: COPILOT

## (undated) DEVICE — ANGIOGRAPHY KIT

## (undated) DEVICE — PROVE COVER: Brand: UNBRANDED

## (undated) DEVICE — CATHETER GUID 6FR L100CM DIA0.071IN NYL SHFT EBU3.5

## (undated) DEVICE — GLIDESHEATH SLENDER ACCESS KIT: Brand: GLIDESHEATH SLENDER

## (undated) DEVICE — WASTE KIT - ST MARY: Brand: MEDLINE INDUSTRIES, INC.

## (undated) DEVICE — CATHETER DIAG 5FR L100CM LUMN ID0.047IN JR4 CRV 0 SIDE H

## (undated) DEVICE — Device: Brand: OMNIWIRE PRESSURE GUIDE WIRE

## (undated) DEVICE — TR BAND RADIAL ARTERY COMPRESSION DEVICE: Brand: TR BAND

## (undated) DEVICE — SPECIAL PROCEDURE DRAPE 32" X 34": Brand: SPECIAL PROCEDURE DRAPE

## (undated) DEVICE — PACK PROCEDURE SURG HRT CATH

## (undated) DEVICE — HI-TORQUE VERSACORE MODIFIED J GUIDE WIRE SYSTEM 145 CM: Brand: HI-TORQUE VERSACORE

## (undated) DEVICE — KIT MED IMAG CNTRST AGNT W/ IOPAMIDOL REUSE